# Patient Record
Sex: FEMALE | Race: WHITE | NOT HISPANIC OR LATINO | Employment: PART TIME | URBAN - METROPOLITAN AREA
[De-identification: names, ages, dates, MRNs, and addresses within clinical notes are randomized per-mention and may not be internally consistent; named-entity substitution may affect disease eponyms.]

---

## 2018-11-22 ENCOUNTER — HOSPITAL ENCOUNTER (EMERGENCY)
Facility: HOSPITAL | Age: 48
Discharge: HOME | End: 2018-11-22
Attending: EMERGENCY MEDICINE | Admitting: EMERGENCY MEDICINE
Payer: COMMERCIAL

## 2018-11-22 VITALS
HEART RATE: 82 BPM | RESPIRATION RATE: 18 BRPM | DIASTOLIC BLOOD PRESSURE: 63 MMHG | WEIGHT: 132 LBS | OXYGEN SATURATION: 97 % | HEIGHT: 65 IN | TEMPERATURE: 96.7 F | SYSTOLIC BLOOD PRESSURE: 137 MMHG | BODY MASS INDEX: 21.99 KG/M2

## 2018-11-22 DIAGNOSIS — S01.511A COMPLICATED LACERATION OF LIP, INITIAL ENCOUNTER: Primary | ICD-10-CM
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
## 2018-11-22 PROCEDURE — 99283 EMERGENCY DEPT VISIT LOW MDM: CPT

## 2018-11-22 RX ORDER — AMOXICILLIN AND CLAVULANATE POTASSIUM 875; 125 MG/1; MG/1
1 TABLET, FILM COATED ORAL EVERY 12 HOURS SCHEDULED
Qty: 20 TABLET | Refills: 0 | Status: SHIPPED | OUTPATIENT
Start: 2018-11-22 | End: 2018-12-02

## 2018-11-22 RX ORDER — ACETAMINOPHEN 500 MG
1000 TABLET ORAL EVERY 6 HOURS PRN
COMMUNITY

## 2018-11-22 RX ORDER — ACYCLOVIR 400 MG/1
400 TABLET ORAL DAILY
COMMUNITY
Start: 2018-08-20

## 2018-11-22 RX ORDER — DIPHENOXYLATE HYDROCHLORIDE AND ATROPINE SULFATE 2.5; .025 MG/1; MG/1
1 TABLET ORAL DAILY
COMMUNITY
End: 2022-02-07 | Stop reason: CLARIF

## 2018-11-22 RX ORDER — LIDOCAINE HYDROCHLORIDE 20 MG/ML
5 INJECTION, SOLUTION EPIDURAL; INFILTRATION; INTRACAUDAL; PERINEURAL ONCE
Status: COMPLETED | OUTPATIENT
Start: 2018-11-22 | End: 2018-11-22

## 2018-11-22 RX ORDER — GINSENG 100 MG
1 CAPSULE ORAL ONCE
Status: COMPLETED | OUTPATIENT
Start: 2018-11-22 | End: 2018-11-22

## 2018-11-22 RX ORDER — AMOXICILLIN AND CLAVULANATE POTASSIUM 875; 125 MG/1; MG/1
1 TABLET, FILM COATED ORAL ONCE
Status: COMPLETED | OUTPATIENT
Start: 2018-11-22 | End: 2018-11-22

## 2018-11-22 RX ADMIN — BACITRACIN ZINC 1 SMALL APPLICATION: 500 OINTMENT TOPICAL at 22:01

## 2018-11-22 RX ADMIN — AMOXICILLIN AND CLAVULANATE POTASSIUM 1 TABLET: 875; 125 TABLET, FILM COATED ORAL at 22:01

## 2018-11-22 RX ADMIN — LIDOCAINE HYDROCHLORIDE 5 ML: 20 INJECTION, SOLUTION EPIDURAL; INFILTRATION; INTRACAUDAL; PERINEURAL at 22:00

## 2018-11-23 NOTE — DISCHARGE INSTRUCTIONS
Animal Bite   WHAT YOU NEED TO KNOW:   Animal bite injuries range from shallow cuts to deep, life-threatening wounds  An animal can cut or puncture the skin when it bites  Your skin may be torn from your body  Your skin may swell or bruise even if the bite does not break the skin  Animal bites occur more often on the hands, arms, legs, and face  Bites from dogs and cats are the most common injuries  DISCHARGE INSTRUCTIONS:   Return to the emergency department if:   · You have a fever  · Your wound is red, swollen, and draining pus  · You see red streaks on the skin around the wound  · You can no longer move the bitten area  · Your heartbeat and breathing are much faster than usual     · You feel dizzy and confused  Contact your healthcare provider if:   · Your pain does not get better, even after you take pain medicine  · You have nightmares or flashbacks about the animal bite  · You have questions or concerns about your condition or care  Medicines: You may need any of the following:  · Antibiotics  prevent or treat a bacterial infection  · Prescription pain medicine  may be given  Ask how to take this medicine safely  · A tetanus vaccine  may be needed to prevent tetanus  Tetanus is a life-threatening bacterial infection that affects the nerves and muscles  The bacteria can be spread through animal bites  · A rabies vaccine  may be needed to prevent rabies  Rabies is a life-threatening viral infection  The virus can be spread through animal bites  · Take your medicine as directed  Contact your healthcare provider if you think your medicine is not helping or if you have side effects  Tell him of her if you are allergic to any medicine  Keep a list of the medicines, vitamins, and herbs you take  Include the amounts, and when and why you take them  Bring the list or the pill bottles to follow-up visits  Carry your medicine list with you in case of an emergency    Follow up with your healthcare provider in 1 to 2 days: You may need to return to have your stitches removed  Write down your questions so you remember to ask them during your visits  Self-care:   · Apply antibiotic ointment as directed  This helps prevent infection in minor skin wounds  It is available without a doctor's order  · Keep the wound clean and covered  Wash the wound every day with soap and water or germ-killing cleanser  Ask your healthcare provider about the kinds of bandages to use  · Apply ice on your wound  Ice helps decrease swelling and pain  Ice may also help prevent tissue damage  Use an ice pack, or put crushed ice in a plastic bag  Cover it with a towel and place it on your wound for 15 to 20 minutes every hour or as directed  · Elevate the wound area  Raise your wound above the level of your heart as often as you can  This will help decrease swelling and pain  Prop your wound on pillows or blankets to keep it elevated comfortably  Prevent another animal bite:   · Learn to recognize the signs of a scared or angry pet  Avoid quick, sudden movements  · Do not step between animals that are fighting  · Do not leave a pet alone with a young child  · Do not disturb an animal while it eats, sleeps, or cares for its young  · Do not approach an animal you do not know, especially one that is tied up or caged  · Stay away from animals that seem sick or act strangely  · Do not feed or capture wild animals  © 2017 [address] [employer] [employer] Information is for End User's use only and may not be sold, redistributed or otherwise used for commercial purposes  All illustrations and images included in CareNotes® are the copyrighted property of [employer]  or Jaime Cantu  The above information is an  only  It is not intended as medical advice for individual conditions or treatments   Talk to your doctor, nurse or pharmacist before following any medical regimen to see if it is safe and effective for you  Facial Laceration   WHAT YOU NEED TO KNOW:   A facial laceration is a tear or cut in the skin caused by blunt or shearing forces, or sharp objects  Facial lacerations may be closed within 24 hours of injury  DISCHARGE INSTRUCTIONS:   Return to the emergency department if:   · You have a fever and the wound is painful, warm, or swollen  The wound area may be red, or fluid may come out of it  · You have heavy bleeding or bleeding that does not stop after 10 minutes of holding firm, direct pressure over the wound  Contact your healthcare provider if:   · Your wound reopens or your tape comes off  · Your wound is very painful  · Your wound is not healing, or you think there is an object in the wound  · The skin around your wound stays numb  · You have questions or concerns about your condition or care  Medicines:   · Antibiotics  may be given to prevent an infection if your wound was deep and had to be cleaned out  · Take your medicine as directed  Contact your healthcare provider if you think your medicine is not helping or if you have side effects  Tell him of her if you are allergic to any medicine  Keep a list of the medicines, vitamins, and herbs you take  Include the amounts, and when and why you take them  Bring the list or the pill bottles to follow-up visits  Carry your medicine list with you in case of an emergency  Care for your wound:  Care for your wound as directed to prevent infection and help it heal  Wash your hands with soap and warm water before and after you care for your wound  You may need to keep the wound dry for the first 24 to 48 hours  When your healthcare provider says it is okay, wash around your wound with soap and water, or as directed  Gently pat the area dry  Do not use alcohol or hydrogen peroxide to clean your wound unless you are directed to    · Do not take aspirin or NSAIDs for 24 hours after being injured  Aspirin and NSAIDs can increase blood flow  Your laceration may continue to bleed  · Do not take hot showers, eat or drink hot foods and liquids for 48 hours after being injured  Also, do not use a heating pad near your laceration  The heat can cause swelling in and around your laceration  · If your wound was covered with a bandage,  leave your bandage on as long as directed  Bandages keep your wound clean and protected  They can also prevent swelling  Ask when and how to change your bandage  Be careful not to apply the bandage or tape too tightly  This could cut off blood flow and cause more injury  · If your wound was closed with stitches,  keep your wound clean  Your healthcare provider may recommend that you apply antibiotic ointment after you clean your wound  · If your wound was closed with wound tape or medical strips,  keep the area clean and dry  The strips will usually fall off on their own after several days  · If your wound was closed with tissue glue,  do not use any ointments or lotions on the area  You may shower, but do not swim or soak in a bathtub  Gently pat the area dry after you take a shower  Do not pick at or scrub the glue area  Decrease scarring: The skin in the area of your wound may turn a different color if it is exposed to direct sunlight  After your wound is healed, use sunscreen over the area when you are out in the sun  You should do this for at least 6 months to 1 year after your injury  Some wounds scar less if they are covered while they heal   Follow up with your healthcare provider as directed: You may need to follow up with your healthcare provider in 24 to 48 hours to have your wound checked for infection  You may need to return in 3 to 5 days if you have stitches that need to be removed  Write down your questions so you remember to ask them during your visits    © 2017 [employer] [employer] Information is for End User's use only and may not be sold, redistributed or otherwise used for commercial purposes  All illustrations and images included in CareNotes® are the copyrighted property of A D A M [employer]  or Jaime Cantu  The above information is an  only  It is not intended as medical advice for individual conditions or treatments  Talk to your doctor, nurse or pharmacist before following any medical regimen to see if it is safe and effective for you

## 2018-11-23 NOTE — ED PROVIDER NOTES
History  Chief Complaint   Patient presents with   [address] she went to kiss her rabbit on the nose and rabbit bit her lower lip one hour ago   2 linear , vertical lacerations lower lip     Pt in ER with c/o bite to her lower lip by her pet rabbit that she was attempting to kiss  Incident occurred at approx 8p  Pt is unsure of her last tetanus vaccine  Prior to Admission Medications   Prescriptions Last Dose Informant Patient Reported? Taking? Biotin 2 5 MG TABS 11/22/2018 at Unknown time  Yes Yes   Sig: Take 1 tablet by mouth daily   Calcium-Magnesium-Vitamin D (CALCIUM 1200+D3 PO) 11/22/2018 at Unknown time  Yes Yes   Sig: Take 1 tablet by mouth 2 (two) times a day   acetaminophen (TYLENOL) 500 mg tablet 11/22/2018 at Unknown time  Yes Yes   Sig: Take 1,000 mg by mouth every 6 (six) hours as needed   acyclovir (ZOVIRAX) 400 MG tablet 11/22/2018 at Unknown time  Yes Yes   Sig: Take 400 mg by mouth 2 (two) times a day   multivitamin (THERAGRAN) TABS 11/22/2018 at Unknown time  Yes Yes   Sig: Take 1 tablet by mouth daily      Facility-Administered Medications: None       Past Medical History:   Diagnosis Date    Hearing impaired        Past Surgical History:   Procedure Laterality Date    JOINT REPLACEMENT Right     knee       History reviewed  No pertinent family history  I have reviewed and agree with the history as documented  Social History   Substance Use Topics    Smoking status: Former Smoker    Smokeless tobacco: Never Used    Alcohol use Yes      Comment: social         Review of Systems   Constitutional: Negative for chills and fever  Respiratory: Negative for cough, chest tightness and shortness of breath  Gastrointestinal: Negative for abdominal pain, diarrhea, nausea and vomiting  Genitourinary: Negative for dysuria, frequency, hematuria and urgency  Musculoskeletal: Negative for back pain, neck pain and neck stiffness  Skin: Positive for wound     All other systems reviewed and are negative  Physical Exam  Physical Exam   Constitutional: She appears well-developed and well-nourished  HENT:   Head: Normocephalic  Head is with laceration  Mouth/Throat: Lacerations present  u shaped laceration noted to right lower lip, with flap crossing vermillion border, approx 4cm in total length   Eyes: Pupils are equal, round, and reactive to light  EOM are normal    Skin: Laceration noted  Nursing note and vitals reviewed  Vital Signs  ED Triage Vitals [11/22/18 2121]   Temperature Pulse Respirations Blood Pressure SpO2   (!) 96 7 °F (35 9 °C) 82 18 137/63 97 %      Temp Source Heart Rate Source Patient Position - Orthostatic VS BP Location FiO2 (%)   Oral Monitor Sitting Right arm --      Pain Score       5           Vitals:    11/22/18 2121 11/22/18 2130   BP: 137/63 137/63   Pulse: 82    Patient Position - Orthostatic VS: Sitting        Visual Acuity      ED Medications  Medications   lidocaine (PF) (XYLOCAINE-MPF) 2 % injection 5 mL (5 mL Infiltration Given 11/22/18 2200)   amoxicillin-clavulanate (AUGMENTIN) 875-125 mg per tablet 1 tablet (1 tablet Oral Given 11/22/18 2201)   bacitracin topical ointment 1 small application (1 small application Topical Given 11/22/18 2201)       Diagnostic Studies  Results Reviewed     None                 No orders to display              Procedures  Lac Repair  Date/Time: 11/22/2018 10:15 PM  Performed by: Teri Holden by: Jevon Cobos   Consent: Verbal consent obtained  Written consent not obtained    Risks and benefits: risks, benefits and alternatives were discussed  Consent given by: patient  Patient understanding: patient states understanding of the procedure being performed  Site marked: the operative site was marked  Required items: required blood products, implants, devices, and special equipment available  Patient identity confirmed: verbally with patient  Time out: Immediately prior to procedure a "time out" was called to verify the correct patient, procedure, equipment, support staff and site/side marked as required  Body area: mouth  Location details: lower lip, interior  Laceration length: 3 cm  Foreign bodies: no foreign bodies  Tendon involvement: none  Nerve involvement: none  Vascular damage: no  Anesthesia: local infiltration    Anesthesia:  Local Anesthetic: lidocaine 2% without epinephrine  Anesthetic total: 5 mL    Sedation:  Patient sedated: no    Wound Dehiscence:  Superficial Wound Dehiscence: simple closure  Secondary closure or dehiscence: complex    Procedure Details:  Preparation: Patient was prepped and draped in the usual sterile fashion  Irrigation solution: tap water  Irrigation method: tap  Amount of cleaning: standard  Debridement: none  Degree of undermining: none  Skin closure: 6-0 nylon  Number of sutures: 8  Technique: simple  Approximation: close  Approximation difficulty: complex  Dressing: antibiotic ointment  Patient tolerance: Patient tolerated the procedure well with no immediate complications             Phone Contacts  ED Phone Contact    ED Course                               MDM  Number of Diagnoses or Management Options  Complicated laceration of lip, initial encounter:   Rabbit bite, initial encounter:   Diagnosis management comments: Laceration is complicated - jagged, with a flap, repaired in ER  Pt referred for f/u with Plastic surgeon   She agrees with plan    Risk of Complications, Morbidity, and/or Mortality  Presenting problems: moderate  Diagnostic procedures: moderate  Management options: moderate    Patient Progress  Patient progress: improved    CritCare Time    Disposition  Final diagnoses:   Complicated laceration of lip, initial encounter   Rabbit bite, initial encounter     Time reflects when diagnosis was documented in both MDM as applicable and the Disposition within this note     Time User Action Codes Description Comment    11/22/2018 11:01 PM Eva Wong Add [L94 [id number]] Complicated laceration of lip, initial encounter     11/22/2018 11:01 PM Eva Wong Add [[id number]] Rabbit bite, initial encounter       ED Disposition     ED Disposition Condition Comment    Discharge  Lucy Rodriguez discharge to home/self care  Condition at discharge: Stable        Follow-up Information     Follow up With Specialties Details Why [address] [facility], [place] Plastic Surgery Schedule an appointment as soon as possible for a visit in 1 day for follow up, For wound re-check [address] [facility] [address]  [address] [facility] [address], [place] Family Medicine In 5 days For suture removal  You may return to the ER if unable to follow up with your primary care physician  [address] [place] [address] Road [address] [phone]            Discharge Medication List as of 11/22/2018 11:04 PM      START taking these medications    Details   amoxicillin-clavulanate (AUGMENTIN) 875-125 mg per tablet Take 1 tablet by mouth every 12 (twelve) hours for 10 days, Starting Thu 11/22/2018, Until Sun 12/2/2018, Normal         CONTINUE these medications which have NOT CHANGED    Details   acetaminophen (TYLENOL) 500 mg tablet Take 1,000 mg by mouth every 6 (six) hours as needed, Historical Med      acyclovir (ZOVIRAX) 400 MG tablet Take 400 mg by mouth 2 (two) times a day, Starting Mon 8/20/2018, Historical Med      Biotin 2 5 MG TABS Take 1 tablet by mouth daily, Historical Med      Calcium-Magnesium-Vitamin D (CALCIUM 1200+D3 PO) Take 1 tablet by mouth 2 (two) times a day, Starting Tue 2/4/2014, Historical Med      multivitamin (THERAGRAN) TABS Take 1 tablet by mouth daily, Historical Med           No discharge procedures on file      ED Provider  Electronically Signed by           Siddhartha Corley DO  11/23/18 5859

## 2019-07-02 ENCOUNTER — TRANSCRIBE ORDERS (OUTPATIENT)
Dept: ADMINISTRATIVE | Facility: HOSPITAL | Age: 49
End: 2019-07-02

## 2019-07-02 DIAGNOSIS — M25.461 KNEE EFFUSION, RIGHT: Primary | ICD-10-CM

## 2019-07-02 DIAGNOSIS — Z96.651 HX OF TOTAL KNEE REPLACEMENT, RIGHT: ICD-10-CM

## 2019-07-09 ENCOUNTER — HOSPITAL ENCOUNTER (OUTPATIENT)
Dept: RADIOLOGY | Facility: HOSPITAL | Age: 49
Discharge: HOME | End: 2019-07-09
Payer: COMMERCIAL

## 2019-07-09 DIAGNOSIS — M25.461 KNEE EFFUSION, RIGHT: ICD-10-CM

## 2019-07-09 DIAGNOSIS — Z96.651 HX OF TOTAL KNEE REPLACEMENT, RIGHT: ICD-10-CM

## 2019-07-09 PROCEDURE — 78315 BONE IMAGING 3 PHASE: CPT

## 2019-07-09 PROCEDURE — A9503 TC99M MEDRONATE: HCPCS

## 2020-09-14 ENCOUNTER — TRANSCRIBE ORDERS (OUTPATIENT)
Dept: URGENT CARE | Facility: CLINIC | Age: 50
End: 2020-09-14

## 2020-09-14 DIAGNOSIS — Z02.1 PRE-EMPLOYMENT HEALTH SCREENING EXAMINATION: Primary | ICD-10-CM

## 2020-09-14 PROCEDURE — 86765 RUBEOLA ANTIBODY: CPT | Performed by: PHYSICIAN ASSISTANT

## 2020-09-14 PROCEDURE — 86480 TB TEST CELL IMMUN MEASURE: CPT | Performed by: PHYSICIAN ASSISTANT

## 2020-11-13 ENCOUNTER — HOSPITAL ENCOUNTER (EMERGENCY)
Facility: HOSPITAL | Age: 50
Discharge: HOME | End: 2020-11-13
Attending: EMERGENCY MEDICINE | Admitting: EMERGENCY MEDICINE
Payer: COMMERCIAL

## 2020-11-13 ENCOUNTER — APPOINTMENT (EMERGENCY)
Dept: RADIOLOGY | Facility: HOSPITAL | Age: 50
End: 2020-11-13

## 2020-11-13 VITALS
TEMPERATURE: 98.2 F | DIASTOLIC BLOOD PRESSURE: 79 MMHG | WEIGHT: 150 LBS | OXYGEN SATURATION: 96 % | RESPIRATION RATE: 18 BRPM | HEART RATE: 78 BPM | SYSTOLIC BLOOD PRESSURE: 132 MMHG | BODY MASS INDEX: 24.96 KG/M2

## 2020-11-13 DIAGNOSIS — M79.89 REDNESS AND SWELLING OF HAND: Primary | ICD-10-CM

## 2020-11-13 DIAGNOSIS — R23.8 REDNESS AND SWELLING OF HAND: Primary | ICD-10-CM

## 2020-11-13 PROCEDURE — 73130 X-RAY EXAM OF HAND: CPT

## 2020-11-13 PROCEDURE — 99284 EMERGENCY DEPT VISIT MOD MDM: CPT | Performed by: PHYSICIAN ASSISTANT

## 2020-11-13 PROCEDURE — 99283 EMERGENCY DEPT VISIT LOW MDM: CPT

## 2020-11-13 RX ORDER — CEPHALEXIN 500 MG/1
500 CAPSULE ORAL EVERY 6 HOURS SCHEDULED
Qty: 28 CAPSULE | Refills: 0 | Status: SHIPPED | OUTPATIENT
Start: 2020-11-13 | End: 2020-11-20

## 2020-12-21 ENCOUNTER — IMMUNIZATIONS (OUTPATIENT)
Dept: FAMILY MEDICINE CLINIC | Facility: HOSPITAL | Age: 50
End: 2020-12-21
Payer: COMMERCIAL

## 2020-12-21 DIAGNOSIS — Z23 ENCOUNTER FOR IMMUNIZATION: ICD-10-CM

## 2020-12-21 PROCEDURE — 0001A SARS-COV-2 / COVID-19 MRNA VACCINE (PFIZER-BIONTECH) 30 MCG: CPT

## 2020-12-21 PROCEDURE — 91300 SARS-COV-2 / COVID-19 MRNA VACCINE (PFIZER-BIONTECH) 30 MCG: CPT

## 2021-01-10 ENCOUNTER — IMMUNIZATIONS (OUTPATIENT)
Dept: FAMILY MEDICINE CLINIC | Facility: HOSPITAL | Age: 51
End: 2021-01-10

## 2021-01-10 DIAGNOSIS — Z23 ENCOUNTER FOR IMMUNIZATION: ICD-10-CM

## 2021-01-10 PROCEDURE — 0002A SARS-COV-2 / COVID-19 MRNA VACCINE (PFIZER-BIONTECH) 30 MCG: CPT

## 2021-01-10 PROCEDURE — 91300 SARS-COV-2 / COVID-19 MRNA VACCINE (PFIZER-BIONTECH) 30 MCG: CPT

## 2021-05-26 ENCOUNTER — OFFICE VISIT (OUTPATIENT)
Dept: CARDIOLOGY CLINIC | Facility: CLINIC | Age: 51
End: 2021-05-26
Payer: COMMERCIAL

## 2021-05-26 VITALS
HEART RATE: 69 BPM | BODY MASS INDEX: 23.6 KG/M2 | DIASTOLIC BLOOD PRESSURE: 68 MMHG | SYSTOLIC BLOOD PRESSURE: 112 MMHG | WEIGHT: 138.2 LBS | HEIGHT: 64 IN | OXYGEN SATURATION: 98 %

## 2021-05-26 DIAGNOSIS — E78.41 ELEVATED LP(A): Primary | ICD-10-CM

## 2021-05-26 PROCEDURE — 1036F TOBACCO NON-USER: CPT | Performed by: INTERNAL MEDICINE

## 2021-05-26 PROCEDURE — 3008F BODY MASS INDEX DOCD: CPT | Performed by: INTERNAL MEDICINE

## 2021-05-26 PROCEDURE — 93000 ELECTROCARDIOGRAM COMPLETE: CPT | Performed by: INTERNAL MEDICINE

## 2021-05-26 PROCEDURE — 99204 OFFICE O/P NEW MOD 45 MIN: CPT | Performed by: INTERNAL MEDICINE

## 2021-05-26 RX ORDER — NORETHINDRONE ACETATE AND ETHINYL ESTRADIOL 1; .02 MG/1; MG/1
1 TABLET ORAL DAILY
COMMUNITY
Start: 2021-05-18 | End: 2022-02-07 | Stop reason: CLARIF

## 2021-05-26 RX ORDER — CHOLECALCIFEROL (VITAMIN D3) 125 MCG
1 CAPSULE ORAL DAILY
COMMUNITY

## 2021-05-26 RX ORDER — FLUOXETINE 20 MG/1
20 TABLET, FILM COATED ORAL DAILY
COMMUNITY
End: 2022-02-07 | Stop reason: CLARIF

## 2021-05-26 NOTE — PROGRESS NOTES
Cardiology Consultation     Phillip Solis  [id number]  1970  [facility] [place]  [id number] [address]      There are no diagnoses linked to this encounter  I had the pleasure of seeing Phillip Solis for a consultation regarding an elevated Lp(a) requested by Dr Kunz    History of the Presenting Illness, Discussion/Summary and my Plan are as follows:::     Tennis Filemon is a pleasant 51-year-old lady without a history of hypertension, diabetes, dyslipidemia  She has only about a 6 pack year smoking history-quit at the age of 29  Alcohol is about 2 glasses of wine a day on average  She is on a completely plant based diet and a diet history was also obtained and is quite healthy  She does not have a family history of premature vascular disease in first-degree relatives-mom is 78 with rheumatoid arthritis and hypothyroidism without vascular disease, father is 80 and quite healthy despite having hypertension and hyperlipidemia  Her sister at 48 is on propranolol for palpitations, also has hyperlipidemia  She is mainly here for an elevated LP(a) at 80, done because she was aware of it and requested it be checked  She is completely asymptomatic from a cardiac standpoint, is quite active, does the [employer] up to 60 minutes a day, 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-heavy physical work without any cardiac symptoms  Has lost about 15 lb in the last 1 year  Gets about [phone] steps a day  Lipid profile is unremarkable with total cholesterol 163, triglycerides 68, HDL 68, LDL 81 and non HDL 95 -March 2021    Plan:    Elevated lipoprotein (a): At this point, there is no consensus as to whether this is a risk factor or a risk marker  In general in patients with vascular disease and elevated LPA, goal is to lower LDL to the maximum tolerated level    In her case, LDL and non-HDL are within normal limits  Will simply check a stress test to rule out any existing coronary artery disease-will suspect that this will be negative  And check a calcium score which will help to compare her with age and gender matched controls  If her calcium score is elevated, will start her on a statin to lower future risk  She will continue to lead her very healthy lifestyle  Follow-up in 6 months    No diagnosis found  There is no problem list on file for this patient      Past Medical History:   Diagnosis Date    Hearing impaired      Social History     Socioeconomic History    Marital status: /Civil Union     Spouse name: Not on file    Number of children: Not on file    Years of education: Not on file    Highest education level: Not on file   Occupational History    Not on file   Social Needs    Financial resource strain: Not on file    Food insecurity     Worry: Not on file     Inability: Not on file   [employer] needs     Medical: Not on file     Non-medical: Not on file   Tobacco Use    Smoking status: Former Smoker    Smokeless tobacco: Never Used   Substance and Sexual Activity    Alcohol use: Yes     Comment: social     Drug use: No    Sexual activity: Not on file   Lifestyle    Physical activity     Days per week: Not on file     Minutes per session: Not on file    Stress: Not on file   Relationships    Social connections     Talks on phone: Not on file     Gets together: Not on file     Attends Advent service: Not on file     Active member of club or organization: Not on file     Attends meetings of clubs or organizations: Not on file     Relationship status: Not on file    Intimate partner violence     Fear of current or ex partner: Not on file     Emotionally abused: Not on file     Physically abused: Not on file     Forced sexual activity: Not on file   Other Topics Concern    Not on file   Social History Narrative    Not on file      Family History   Problem Relation Age of Onset    Heart disease Mother     Hypertension Father     Hyperlipidemia Father     Heart disease Father     Heart failure Maternal Grandmother     Heart attack Paternal Grandfather     Heart failure Cousin      Past Surgical History:   Procedure Laterality Date    JOINT REPLACEMENT Right     knee       Current Outpatient Medications:     acetaminophen (TYLENOL) 500 mg tablet, Take 1,000 mg by mouth every 6 (six) hours as needed, Disp: , Rfl:     acyclovir (ZOVIRAX) 400 MG tablet, Take 400 mg by mouth daily , Disp: , Rfl:     Cholecalciferol (Vitamin D) 125 MCG (5000 UT) CAPS, Take 1 capsule by mouth daily, Disp: , Rfl:     FLUoxetine (PROzac) 20 MG tablet, Take 20 mg by mouth daily And 40 mg every other day, Disp: , Rfl:     multivitamin (THERAGRAN) TABS, Take 1 tablet by mouth daily, Disp: , Rfl:     norethindrone-ethinyl estradiol (MICROGESTIN 1/20) 1-20 MG-MCG per tablet, Take 1 tablet by mouth daily, Disp: , Rfl:     Biotin 2 5 MG TABS, Take 1 tablet by mouth daily, Disp: , Rfl:     Calcium-Magnesium-Vitamin D (CALCIUM 1200+D3 PO), Take 1 tablet by mouth 2 (two) times a day, Disp: , Rfl:   No Known Allergies  Vitals:    05/26/21 1256   BP: 112/68   BP Location: Left arm   Patient Position: Sitting   Cuff Size: Standard   Pulse: 69   SpO2: 98%   Weight: 62 7 kg (138 lb 3 2 oz)   Height: 5' 4" (1 626 m)         Imaging: No results found  Review of Systems:  Review of Systems   Constitutional: Negative  HENT: Negative  Eyes: Negative  Respiratory: Negative  Cardiovascular: Negative  Endocrine: Negative  Musculoskeletal: Negative  Physical Exam:    /68 (BP Location: Left arm, Patient Position: Sitting, Cuff Size: Standard)   Pulse 69   Ht 5' 4" (1 626 m)   Wt 62 7 kg (138 lb 3 2 oz)   SpO2 98%   BMI 23 72 kg/m²   Physical Exam  Constitutional:       General: She is not in acute distress  Appearance: Normal appearance   She is not ill-appearing  HENT:      Nose: Nose normal  No congestion or rhinorrhea  Mouth/Throat:      Mouth: Mucous membranes are moist       Pharynx: No oropharyngeal exudate or posterior oropharyngeal erythema  Eyes:      General:         Right eye: No discharge  Left eye: No discharge  Conjunctiva/sclera: Conjunctivae normal       Pupils: Pupils are equal, round, and reactive to light  Cardiovascular:      Rate and Rhythm: Normal rate and regular rhythm  Heart sounds: No murmur  No friction rub  No gallop  Pulmonary:      Effort: Pulmonary effort is normal  No respiratory distress  Breath sounds: No stridor  No wheezing or rhonchi  Abdominal:      General: Abdomen is flat  Bowel sounds are normal  There is no distension  Palpations: There is no mass  Tenderness: There is no abdominal tenderness  Musculoskeletal: Normal range of motion  General: No swelling, tenderness, deformity or signs of injury  Skin:     Coloration: Skin is not jaundiced or pale  Findings: No bruising or erythema  Neurological:      Mental Status: She is alert  This note was completed in part utilizing [email or web address] direct voice recognition software  Grammatical errors, random word insertion, spelling mistakes, occasional wrong word or "sound-alike" substitutions and incomplete sentences may be an occasional consequence of the system secondary to software limitations, ambient noise and hardware issues  At the time of dictation, efforts were made to edit, clarify and /or correct errors  Please read the chart carefully and recognize, using context, where substitutions have occurred  If you have any questions or concerns about the context, text or information contained within the body of this dictation, please contact myself, the provider, for further clarification

## 2021-06-03 ENCOUNTER — HOSPITAL ENCOUNTER (OUTPATIENT)
Dept: RADIOLOGY | Facility: HOSPITAL | Age: 51
Discharge: HOME | End: 2021-06-03
Attending: INTERNAL MEDICINE
Payer: COMMERCIAL

## 2021-06-03 ENCOUNTER — TRANSCRIBE ORDERS (OUTPATIENT)
Dept: RADIOLOGY | Facility: HOSPITAL | Age: 51
End: 2021-06-03

## 2021-06-03 DIAGNOSIS — E78.41 ELEVATED LP(A): ICD-10-CM

## 2021-06-03 PROCEDURE — 75571 CT HRT W/O DYE W/CA TEST: CPT

## 2021-06-03 PROCEDURE — G1004 CDSM NDSC: HCPCS

## 2021-06-10 DIAGNOSIS — E78.41 ELEVATED LP(A): Primary | ICD-10-CM

## 2021-06-10 DIAGNOSIS — R93.1 ELEVATED CORONARY ARTERY CALCIUM SCORE: ICD-10-CM

## 2021-06-10 RX ORDER — ROSUVASTATIN CALCIUM 5 MG/1
2.5 TABLET, COATED ORAL DAILY
Qty: 30 TABLET | Refills: 3 | Status: SHIPPED | OUTPATIENT
Start: 2021-06-10 | End: 2021-12-06 | Stop reason: SDUPTHER

## 2021-06-10 NOTE — PROGRESS NOTES
Ordered rosuvastatin 2 5 mg daily  While her calcium score was only at 6, that would still put her in the 88 percentile for  Matched controls  She was willing to start a statin    Recheck lipids in 3 months   discussed with her on the phone

## 2021-06-17 ENCOUNTER — HOSPITAL ENCOUNTER (OUTPATIENT)
Dept: NON INVASIVE DIAGNOSTICS | Facility: CLINIC | Age: 51
Discharge: HOME | End: 2021-06-17
Payer: COMMERCIAL

## 2021-06-17 DIAGNOSIS — E78.41 ELEVATED LP(A): ICD-10-CM

## 2021-06-17 PROCEDURE — 93017 CV STRESS TEST TRACING ONLY: CPT

## 2021-06-17 PROCEDURE — 93018 CV STRESS TEST I&R ONLY: CPT | Performed by: INTERNAL MEDICINE

## 2021-06-17 PROCEDURE — 93016 CV STRESS TEST SUPVJ ONLY: CPT | Performed by: INTERNAL MEDICINE

## 2021-06-21 ENCOUNTER — TELEPHONE (OUTPATIENT)
Dept: CARDIOLOGY CLINIC | Facility: CLINIC | Age: 51
End: 2021-06-21

## 2021-06-21 DIAGNOSIS — R94.39 ABNORMAL STRESS TEST: Primary | ICD-10-CM

## 2021-06-23 LAB
ARRHY DURING EX: NORMAL
CHEST PAIN STATEMENT: NORMAL
MAX DIASTOLIC BP: 70 MMHG
MAX HEART RATE: 148 BPM
MAX PREDICTED HEART RATE: 170 BPM
MAX. SYSTOLIC BP: 152 MMHG
PROTOCOL NAME: NORMAL
REASON FOR TERMINATION: NORMAL
TARGET HR FORMULA: NORMAL
TEST INDICATION: NORMAL
TIME IN EXERCISE PHASE: NORMAL

## 2021-06-28 ENCOUNTER — HOSPITAL ENCOUNTER (OUTPATIENT)
Dept: NON INVASIVE DIAGNOSTICS | Facility: HOSPITAL | Age: 51
Discharge: HOME | End: 2021-06-28
Payer: COMMERCIAL

## 2021-06-28 DIAGNOSIS — R94.39 ABNORMAL STRESS TEST: ICD-10-CM

## 2021-06-28 PROCEDURE — 93350 STRESS TTE ONLY: CPT

## 2021-06-28 PROCEDURE — 93351 STRESS TTE COMPLETE: CPT | Performed by: INTERNAL MEDICINE

## 2021-06-29 LAB
ARRHY DURING EX: NORMAL
CHEST PAIN STATEMENT: NORMAL
MAX DIASTOLIC BP: 70 MMHG
MAX HEART RATE: 155 BPM
MAX PREDICTED HEART RATE: 170 BPM
MAX. SYSTOLIC BP: 150 MMHG
PROTOCOL NAME: NORMAL
REASON FOR TERMINATION: NORMAL
TARGET HR FORMULA: NORMAL
TEST INDICATION: NORMAL
TIME IN EXERCISE PHASE: NORMAL

## 2021-07-02 ENCOUNTER — TELEPHONE (OUTPATIENT)
Dept: CARDIOLOGY CLINIC | Facility: CLINIC | Age: 51
End: 2021-07-02

## 2021-08-22 ENCOUNTER — NURSE TRIAGE (OUTPATIENT)
Dept: OTHER | Facility: OTHER | Age: 51
End: 2021-08-22

## 2021-08-22 DIAGNOSIS — Z20.828 EXPOSURE TO SARS-ASSOCIATED CORONAVIRUS: Primary | ICD-10-CM

## 2021-08-22 PROCEDURE — 87635 SARS-COV-2 COVID-19 AMP PRB: CPT | Performed by: FAMILY MEDICINE

## 2021-08-22 NOTE — TELEPHONE ENCOUNTER
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

## 2021-08-22 NOTE — TELEPHONE ENCOUNTER
Reason for Disposition   [1] COVID-19 infection suspected by caller or triager AND [2] mild symptoms (cough, fever, or others) AND [9] no complications or SOB    Answer Assessment - Initial Assessment Questions  1  Were you within 6 feet or less, for up to 15 minutes or more with a person that has a confirmed COVID-19 test?   Yes, at work     2  What was the date of your exposure? 8/18/2021     3  Are you experiencing any symptoms attributed to the virus?  (Assess for SOB, cough, fever, difficulty breathing)   Started yesterday cough, headache, congestion, and runny nose  No fever at this time  Denies SOB  4   HIGH RISK: Do you have any history heart or lung conditions, weakened immune system, diabetes, Asthma, CHF, HIV, COPD, Chemo, renal failure, sickle cell, etc?   Denies    Protocols used: CORONAVIRUS (COVID-19) DIAGNOSED OR SUSPECTED-[facility]

## 2021-08-22 NOTE — TELEPHONE ENCOUNTER
[employer] employee  Order placed for COVID testing due to exposure and symptoms, despite being COVID vaccinated  Patient to follow up with her manager and employee health  Has an out of network PCP that she will be following up with as well

## 2021-08-24 ENCOUNTER — TELEPHONE (OUTPATIENT)
Dept: OTHER | Facility: OTHER | Age: 51
End: 2021-08-24

## 2021-08-25 NOTE — TELEPHONE ENCOUNTER
The patient was called for notification of a test result for COVID-19  The patient did not answer the phone and a voicemail was left requesting a call back to [phone], Option 7

## 2021-08-26 ENCOUNTER — TELEPHONE (OUTPATIENT)
Dept: OTHER | Facility: OTHER | Age: 51
End: 2021-08-26

## 2021-08-27 NOTE — TELEPHONE ENCOUNTER
The patient was called for notification of a test result for COVID-19  The patient did not answer the phone and a voicemail was left requesting a call back to [phone], Option 7

## 2021-12-02 ENCOUNTER — APPOINTMENT (OUTPATIENT)
Dept: LAB | Facility: HOSPITAL | Age: 51
End: 2021-12-02
Attending: INTERNAL MEDICINE
Payer: COMMERCIAL

## 2021-12-02 DIAGNOSIS — E78.41 ELEVATED LP(A): ICD-10-CM

## 2021-12-02 DIAGNOSIS — R93.1 ELEVATED CORONARY ARTERY CALCIUM SCORE: ICD-10-CM

## 2021-12-02 LAB
CHOLEST SERPL-MCNC: 165 MG/DL
HDLC SERPL-MCNC: 69 MG/DL
LDLC SERPL CALC-MCNC: 72 MG/DL (ref 0–100)
TRIGL SERPL-MCNC: 120 MG/DL

## 2021-12-02 PROCEDURE — 80061 LIPID PANEL: CPT

## 2021-12-02 PROCEDURE — 36415 COLL VENOUS BLD VENIPUNCTURE: CPT

## 2021-12-06 ENCOUNTER — OFFICE VISIT (OUTPATIENT)
Dept: CARDIOLOGY CLINIC | Facility: CLINIC | Age: 51
End: 2021-12-06
Payer: COMMERCIAL

## 2021-12-06 VITALS
HEIGHT: 64 IN | WEIGHT: 147.4 LBS | BODY MASS INDEX: 25.16 KG/M2 | SYSTOLIC BLOOD PRESSURE: 120 MMHG | HEART RATE: 66 BPM | DIASTOLIC BLOOD PRESSURE: 76 MMHG

## 2021-12-06 DIAGNOSIS — E78.41 ELEVATED LP(A): ICD-10-CM

## 2021-12-06 DIAGNOSIS — Z00.00 HEALTHCARE MAINTENANCE: Primary | ICD-10-CM

## 2021-12-06 DIAGNOSIS — R93.1 ELEVATED CORONARY ARTERY CALCIUM SCORE: ICD-10-CM

## 2021-12-06 PROCEDURE — 99214 OFFICE O/P EST MOD 30 MIN: CPT | Performed by: INTERNAL MEDICINE

## 2021-12-06 PROCEDURE — 93000 ELECTROCARDIOGRAM COMPLETE: CPT | Performed by: INTERNAL MEDICINE

## 2021-12-06 RX ORDER — AMOXICILLIN 500 MG/1
CAPSULE ORAL
COMMUNITY
Start: 2021-12-06

## 2021-12-06 RX ORDER — SERTRALINE HYDROCHLORIDE 25 MG/1
TABLET, FILM COATED ORAL
COMMUNITY
Start: 2021-10-28 | End: 2022-02-07 | Stop reason: CLARIF

## 2021-12-06 RX ORDER — ROSUVASTATIN CALCIUM 5 MG/1
5 TABLET, COATED ORAL DAILY
Qty: 90 TABLET | Refills: 3 | Status: SHIPPED | OUTPATIENT
Start: 2021-12-06 | End: 2022-05-19 | Stop reason: SINTOL

## 2021-12-09 ENCOUNTER — IMMUNIZATIONS (OUTPATIENT)
Dept: FAMILY MEDICINE CLINIC | Facility: HOSPITAL | Age: 51
End: 2021-12-09

## 2021-12-09 DIAGNOSIS — Z23 ENCOUNTER FOR IMMUNIZATION: Primary | ICD-10-CM

## 2021-12-09 PROCEDURE — 0001A COVID-19 PFIZER VACC 0.3 ML: CPT

## 2021-12-09 PROCEDURE — 91300 COVID-19 PFIZER VACC 0.3 ML: CPT

## 2021-12-15 ENCOUNTER — TELEPHONE (OUTPATIENT)
Dept: CARDIOLOGY CLINIC | Facility: CLINIC | Age: 51
End: 2021-12-15

## 2022-02-07 ENCOUNTER — OFFICE VISIT (OUTPATIENT)
Dept: GASTROENTEROLOGY | Facility: CLINIC | Age: 52
End: 2022-02-07
Payer: COMMERCIAL

## 2022-02-07 VITALS
TEMPERATURE: 97.7 F | HEART RATE: 68 BPM | HEIGHT: 64 IN | BODY MASS INDEX: 25.27 KG/M2 | RESPIRATION RATE: 18 BRPM | DIASTOLIC BLOOD PRESSURE: 70 MMHG | OXYGEN SATURATION: 97 % | SYSTOLIC BLOOD PRESSURE: 118 MMHG | WEIGHT: 148 LBS

## 2022-02-07 DIAGNOSIS — Z11.59 NEED FOR HEPATITIS C SCREENING TEST: ICD-10-CM

## 2022-02-07 DIAGNOSIS — Z12.11 COLON CANCER SCREENING: ICD-10-CM

## 2022-02-07 DIAGNOSIS — K21.9 GASTROESOPHAGEAL REFLUX DISEASE, UNSPECIFIED WHETHER ESOPHAGITIS PRESENT: ICD-10-CM

## 2022-02-07 DIAGNOSIS — R10.13 DYSPEPSIA: Primary | ICD-10-CM

## 2022-02-07 PROCEDURE — 99244 OFF/OP CNSLTJ NEW/EST MOD 40: CPT | Performed by: INTERNAL MEDICINE

## 2022-02-07 RX ORDER — SODIUM, POTASSIUM,MAG SULFATES 17.5-3.13G
1 SOLUTION, RECONSTITUTED, ORAL ORAL ONCE
Qty: 177 ML | Refills: 0 | Status: SHIPPED | OUTPATIENT
Start: 2022-02-07 | End: 2022-05-31 | Stop reason: ALTCHOICE

## 2022-02-07 RX ORDER — NAPROXEN SODIUM 220 MG
TABLET ORAL
COMMUNITY

## 2022-02-07 NOTE — PATIENT INSTRUCTIONS
Scheduled date of EGD/colonoscopy (as of today):03 14 22  Physician performing EGD/colonoscopy: Dr Isela Selby  Location of EGD/colonoscopy:[facility]  Desired bowel prep reviewed with patient:SUPREP  Instructions reviewed with patient by:NUNU IN THE OFFICE  Clearances:  N/A

## 2022-02-07 NOTE — PROGRESS NOTES
[facility] - Outpatient Consultation  Marchia Goltz 46 y o  female MRN: [id number]  Encounter: [id number]        ASSESSMENT AND PLAN:  46year old female here for evaluation  1  Dyspepsia/GERD  -symptoms occurring 2 to 4 times a week  -reasonable to do EGD to ensure no erosive esophagitis  -pt does not want med at this time  -discussed GERD diet and lifestyle changes, handout given    2  Colon cancer screening  -will do colonoscopy as patient is agreeable, she had a colonoscopy in 2017 but was told was a fair prep and was given a 10 year follow-up, given fair prep and now is over 50 will repeat colonoscopy now    3  Need for hepatitis C screening test  -will order    Follow up annually due to chronic GERD symptoms        __________________________________________________________    HPI:  46year old female here for evaluation  Drinking alcohol 2-4 drinks daily  Quit smoking in 1998  Complaining of GERD symptoms with feeling of burping and "mini throw ups "  Symptoms are occurring 2-4 times a week  Used to take prilosec but then had covid and stopped it  Symptoms made worse with tomato sauce or certain spices  Usually just avoiding trigger foods is helpful for her symptoms  Denies any dysphagia or weight loss  Last EGD in 2015  Had a colonoscopy in 2017 due to rectal bleeding  Reports a known fissure for years  Was told it was not a great prep at her colonoscopy  Doesn't recall having any polyps removed  Denies family history of colon cancer or personal history of liver disease  Currently having some hemorrhoids, she thinks, or a skin tag that is bothering her in the rectum  Takes dulcolax every other day currently  Currently having a BM every day  Had knee revision surgery on the right- her third surgery  Hasn't been working in 2 months time  Currently doing PT for the knee  Sees Cardiology for elevated lipoprotein A    She is on a statin  REVIEW OF SYSTEMS:    CONSTITUTIONAL: Denies any fever, chills, rigors, and weight loss  HEENT: No earache or tinnitus  Denies hearing loss or visual disturbances  CARDIOVASCULAR: No chest pain or palpitations  RESPIRATORY: Denies any cough, hemoptysis, shortness of breath or dyspnea on exertion  GASTROINTESTINAL: As noted in the History of Present Illness  GENITOURINARY: No problems with urination  Denies any hematuria or dysuria  NEUROLOGIC: No dizziness or vertigo, denies headaches  MUSCULOSKELETAL:  Recent knee surgery   SKIN: Denies skin rashes or itching  ENDOCRINE: Denies excessive thirst  Denies intolerance to heat or cold  PSYCHOSOCIAL: Denies depression or anxiety  Denies any recent memory loss       Historical Information   Past Medical History:   Diagnosis Date    Hearing impaired      Past Surgical History:   Procedure Laterality Date    JOINT REPLACEMENT Right     knee     Social History   Social History     Substance and Sexual Activity   Alcohol Use Yes    Comment: social      Social History     Substance and Sexual Activity   Drug Use No     Social History     Tobacco Use   Smoking Status Former Smoker   Smokeless Tobacco Never Used     Family History   Problem Relation Age of Onset    Heart disease Mother     Hypertension Father     Hyperlipidemia Father     Heart disease Father     Heart failure Maternal Grandmother     Heart attack Paternal Grandfather     Heart failure Cousin      Meds/Allergies       Current Outpatient Medications:     acetaminophen (TYLENOL) 500 mg tablet    acyclovir (ZOVIRAX) 400 MG tablet    amoxicillin (AMOXIL) 500 mg capsule    Cholecalciferol (Vitamin D) 125 MCG (5000 UT) CAPS    naproxen sodium (Aleve) 220 MG tablet    rosuvastatin (CRESTOR) 5 mg tablet    Allergies   Allergen Reactions    Gabapentin Anxiety and Delirium    Nickel Dermatitis and Itching    Titanium Dermatitis, Itching and Other (See Comments)     Objective Blood pressure 118/70, pulse 68, temperature 97 7 °F (36 5 °C), temperature source Tympanic, resp  rate 18, height 5' 4" (1 626 m), weight 67 1 kg (148 lb), SpO2 97 %  Body mass index is 25 4 kg/m²  PHYSICAL EXAM:      General Appearance:   Alert, cooperative, no distress, difficulty getting on exam table due to recent knee surgery   HEENT:   Normocephalic, atraumatic, anicteric      Neck:  Supple, symmetrical, trachea midline   Lungs:   Clear to auscultation bilaterally; no rales, rhonchi or wheezing; respirations unlabored    Heart[de-identified]   Regular rate and rhythm; no murmur, rub, or gallop  Abdomen:   Soft, non-tender, non-distended; normal bowel sounds; no masses, no organomegaly    Genitalia:   Deferred    Rectal:   Deferred    Extremities:  No cyanosis, clubbing or edema    Pulses:  2+ and symmetric    Skin:  No jaundice, rashes, or lesions    Lymph nodes:  No palpable cervical lymphadenopathy        Lab Results:   No visits with results within 1 Day(s) from this visit  Latest known visit with results is:   Appointment on 12/02/2021   Component Date Value    Cholesterol 12/02/2021 165     Triglycerides 12/02/2021 120     HDL, Direct 12/02/2021 69     LDL Calculated 12/02/2021 72      Recent LFT's wnl  Hb 12 3    Radiology Results:   No results found

## 2022-02-09 ENCOUNTER — PATIENT MESSAGE (OUTPATIENT)
Dept: GASTROENTEROLOGY | Facility: CLINIC | Age: 52
End: 2022-02-09

## 2022-02-09 DIAGNOSIS — K64.9 HEMORRHOIDS, UNSPECIFIED HEMORRHOID TYPE: Primary | ICD-10-CM

## 2022-02-09 RX ORDER — MENTHOL AND ZINC OXIDE .44; 20.625 G/100G; G/100G
OINTMENT TOPICAL DAILY
Qty: 30 G | Refills: 0 | Status: SHIPPED | OUTPATIENT
Start: 2022-02-09

## 2022-02-16 ENCOUNTER — OFFICE VISIT (OUTPATIENT)
Dept: OBGYN CLINIC | Facility: CLINIC | Age: 52
End: 2022-02-16
Payer: COMMERCIAL

## 2022-02-16 VITALS
BODY MASS INDEX: 25.78 KG/M2 | HEIGHT: 64 IN | SYSTOLIC BLOOD PRESSURE: 106 MMHG | DIASTOLIC BLOOD PRESSURE: 64 MMHG | WEIGHT: 151 LBS

## 2022-02-16 DIAGNOSIS — N95.1 MENOPAUSAL VAGINAL DRYNESS: Primary | ICD-10-CM

## 2022-02-16 PROCEDURE — 99204 OFFICE O/P NEW MOD 45 MIN: CPT | Performed by: OBSTETRICS & GYNECOLOGY

## 2022-02-16 RX ORDER — CELECOXIB 200 MG/1
200 CAPSULE ORAL DAILY
COMMUNITY
Start: 2021-12-22 | End: 2022-05-04 | Stop reason: ALTCHOICE

## 2022-02-16 RX ORDER — ASPIRIN 81 MG/1
81 TABLET ORAL 2 TIMES DAILY
COMMUNITY
Start: 2021-12-21 | End: 2022-05-04 | Stop reason: ALTCHOICE

## 2022-02-16 RX ORDER — ONDANSETRON 4 MG/1
TABLET, FILM COATED ORAL
COMMUNITY
Start: 2021-12-22

## 2022-02-16 NOTE — PROGRESS NOTES
Assessment/Plan:   Diagnoses and all orders for this visit:    Menopausal vaginal dryness  - discussed that symptoms such as vaginal dryness is culminating and likely contributing to her decreased libido but also decreased libido is common during menopause  Reviewed different options for treatment of vaginal dryness with non-hormonal options, HRT, and vaginal estrogen  - discussed recommendation for use of silicone lubricant, coconut oil, and barrier emollient to help with vaginal irritation if dryness is causing vaginal irritation even when not sexually active  Discussed different sexual script and approach to sex prior to insertion in penetrating intercourse and use of coconut oil and lubricant to help with arousal and sexual pleasure to make the activity less uncomfortable  - discussed that vaginal estrogen is likely to help significantly with vaginal dryness as is HRT  Patient would prefer plant based product and conjugated estrogens go against her personal tenet as a vegan  - We discussed the risk of heart attack, stroke, breast cancer and blood clots with HRT  We discussed the need for progesterone as unopposed estrogen can lead to growth of the uterine lining that can increase the risk of uterine cancer  We discussed side effects may include vaginal spotting, fluid retention and breast soreness  We discussed the benefits of hormone replacement therapy which has been shown to be the best treatment for the relief of hot flashes and night sweats, and also offers relief of vaginal dryness, protect against bone loss and helps prevent hip and spine fractures and that combined estrogen progestin therapy may reduce the risk of colon cancer  Follow up prn for further discussion of treatment options or for annual gyn visit  Other orders  -     tapentadol (NUCYNTA) 50 mg tablet;  Take 50 mg by mouth every 6 (six) hours as needed  -     Polyethylene Glycol 3350 (DULCOLAX BALANCE PO)  -     celecoxib (CeleBREX) 200 mg capsule; Take 200 mg by mouth daily (Patient not taking: Reported on 2/16/2022 )  -     aspirin (ECOTRIN LOW STRENGTH) 81 mg EC tablet; Take 81 mg by mouth 2 (two) times a day  -     ondansetron (ZOFRAN) 4 mg tablet; TAKE 1 TABLET BY MOUTH EVERY DAY AS NEEDED FOR NAUSEA OR VOMITING        Subjective:    Patient ID: Zeke Huddleston is a 46 y o  female  Chief Complaint   Patient presents with   [facility] Patient Visit     patient is new to our practice   Menopause     vaginal dryness, moodiness       Lucy is a 50yo patient presenting as a new patient for gyn consultation regarding menopausal symptoms  She was seen by her prior OBGYN provider in April 2021 and states that she expressed some concern regarding her vaginal dryness and pain with intercourse to her provider then as well  She also reports moodiness and low libido  She states that she is unsure of her last menstrual period date but thinks that it occurred in Fall 2021 (possibly September 2021?)  She reports a history of mood conditions such as depression and anxiety for which she has tried Prozac and Zoloft in the past but she stopped as she felt that the medication was not helping and making her feel very "foggy"  She was also prescribed an oral OCP from her prior gyn but stopped taking it after a couple months as it was not helping her dyspareunia and dryness  The following portions of the patient's history were reviewed and updated as appropriate: allergies, current medications, past family history, past medical history, past social history, past surgical history and problem list     Review of Systems   Constitutional: Negative for activity change, appetite change and unexpected weight change  Respiratory: Negative for cough and shortness of breath  Cardiovascular: Negative for chest pain  Gastrointestinal: Negative for rectal pain  Genitourinary: Positive for vaginal pain   Negative for dyspareunia, menstrual problem, pelvic pain, vaginal bleeding and vaginal discharge  Low libido   Psychiatric/Behavioral: Positive for dysphoric mood  Negative for sleep disturbance  Moodiness         Objective:  /64 (BP Location: Right arm, Patient Position: Sitting, Cuff Size: Standard)   Ht 5' 4" (1 626 m)   Wt 68 5 kg (151 lb)   BMI 25 92 kg/m²   Physical Exam  Constitutional:       General: She is not in acute distress  Appearance: Normal appearance  She is normal weight  She is not diaphoretic  HENT:      Head: Normocephalic and atraumatic  Pulmonary:      Effort: Pulmonary effort is normal  No respiratory distress  Musculoskeletal:      Right lower leg: No edema  Left lower leg: No edema  Neurological:      Mental Status: She is alert and oriented to person, place, and time  Skin:     General: Skin is warm and dry  Coloration: Skin is not pale  Psychiatric:         Mood and Affect: Mood normal          Behavior: Behavior normal          Thought Content: Thought content normal          Judgment: Judgment normal    Vitals and nursing note reviewed

## 2022-02-16 NOTE — PATIENT INSTRUCTIONS
Fransisco Ayala,  These are the medications that would be considered for menopausal symptoms that we discussed today:    Paxil (paroxetine) - SSRI (selective serotonin reuptake inhibitor)  Common medication for anxiety and depression but found to be helpful for vasomotor menopausal symptoms and sleep disturbance if that is main complaint/struggle with menopause  Non-hormonal approach  Vaginal estrogens  Premarin - conjugated estrogen  Not amenable for you as you mentioned seeking more plant based or vegan options, but wanted to list everything I mentioned  Estrace - vaginal estrogen  I believe it's plant based and not conjugated estrogen, but I would encourage you to double check to be sure  Combo estrogen/progesterone patch - hormone replacement therapy  - CombiPatch or Vivelle-Dot are 2 brands that are commonly used for hormone replacement therapy with weekly or twice weekly patch forms  The estrogen within in are synthetic and plant based  One last option that we didn't fully discuss is the use of bioidentical hormones  I don't order or manage those types of hormones but a local physician in our area, Dr Debbi Lugo, does run a Menopause focused clinic/practice that has those options  If that is something you are interested, it is another option to pursue

## 2022-02-17 ENCOUNTER — PATIENT MESSAGE (OUTPATIENT)
Dept: OBGYN CLINIC | Facility: CLINIC | Age: 52
End: 2022-02-17

## 2022-02-17 DIAGNOSIS — N94.19 DYSPAREUNIA DUE TO MEDICAL CONDITION IN FEMALE: ICD-10-CM

## 2022-02-17 DIAGNOSIS — N95.1 MENOPAUSAL VAGINAL DRYNESS: Primary | ICD-10-CM

## 2022-02-17 DIAGNOSIS — N95.1 MENOPAUSAL VASOMOTOR SYNDROME: ICD-10-CM

## 2022-02-17 RX ORDER — PROGESTERONE 100 MG/1
1 CAPSULE ORAL
Qty: 84 CAPSULE | Refills: 1 | Status: SHIPPED | OUTPATIENT
Start: 2022-02-17 | End: 2022-05-04 | Stop reason: SDUPTHER

## 2022-02-17 RX ORDER — ESTRADIOL 0.03 MG/D
1 FILM, EXTENDED RELEASE TRANSDERMAL 2 TIMES WEEKLY
Qty: 24 PATCH | Refills: 1 | Status: SHIPPED | OUTPATIENT
Start: 2022-02-17 | End: 2022-05-04 | Stop reason: SDUPTHER

## 2022-03-01 ENCOUNTER — DOCUMENTATION (OUTPATIENT)
Dept: CARDIOLOGY CLINIC | Facility: CLINIC | Age: 52
End: 2022-03-01

## 2022-03-01 NOTE — PROGRESS NOTES
Complaints of whole body pains including arm discomfort/myalgias without a clear reason-   Symptoms for the last 1 month  Statin dose was increased from rosuvastatin 2 5--rosuvastatin 5 mg 3 months ago  She had knee surgery 10 weeks ago and is slowly recuperating        Will have her hold her statin entirely for 4 weeks and if she sees no improvement, will go back on rosuvastatin 5 mg daily   if her symptoms improve, will try going back on 2 5 mg daily     plan was discussed with her, she will keep us posted

## 2022-03-10 ENCOUNTER — TELEPHONE (OUTPATIENT)
Dept: GASTROENTEROLOGY | Facility: AMBULARY SURGERY CENTER | Age: 52
End: 2022-03-10

## 2022-03-10 ENCOUNTER — APPOINTMENT (OUTPATIENT)
Dept: LAB | Age: 52
End: 2022-03-10
Payer: COMMERCIAL

## 2022-03-10 DIAGNOSIS — Z11.59 NEED FOR HEPATITIS C SCREENING TEST: ICD-10-CM

## 2022-03-10 LAB — HCV AB SER QL: NORMAL

## 2022-03-10 PROCEDURE — 36415 COLL VENOUS BLD VENIPUNCTURE: CPT

## 2022-03-10 PROCEDURE — 86803 HEPATITIS C AB TEST: CPT

## 2022-03-10 NOTE — TELEPHONE ENCOUNTER
Patients GI provider:  Dr Lisa Kaba     Number to return call: [phone]    Reason for call: Pt calling requesting to reschedule her procedure       Scheduled procedure/appointment date if applicable: Apt/procedure 3/14/22

## 2022-03-11 NOTE — TELEPHONE ENCOUNTER
Left message for patient to call back to reschedule procedure  Offered 5/31, 6/2, 6/6 or 6/27 with Dr Cyndie Reyes at [facility]  Gave my direct line to call back on

## 2022-03-14 NOTE — TELEPHONE ENCOUNTER
Colon/egd rescheduled to 5/31/22 with Dr Devin Barragan at [facility] [address]  Patient still has prep instructions

## 2022-05-04 ENCOUNTER — ANNUAL EXAM (OUTPATIENT)
Dept: OBGYN CLINIC | Facility: CLINIC | Age: 52
End: 2022-05-04
Payer: COMMERCIAL

## 2022-05-04 VITALS
HEIGHT: 64 IN | WEIGHT: 150.8 LBS | SYSTOLIC BLOOD PRESSURE: 114 MMHG | DIASTOLIC BLOOD PRESSURE: 72 MMHG | BODY MASS INDEX: 25.74 KG/M2

## 2022-05-04 DIAGNOSIS — N95.1 MENOPAUSAL VAGINAL DRYNESS: ICD-10-CM

## 2022-05-04 DIAGNOSIS — N95.1 MENOPAUSAL VASOMOTOR SYNDROME: ICD-10-CM

## 2022-05-04 DIAGNOSIS — Z01.419 ENCOUNTER FOR WELL WOMAN EXAM WITH ROUTINE GYNECOLOGICAL EXAM: Primary | ICD-10-CM

## 2022-05-04 DIAGNOSIS — Z12.31 ENCOUNTER FOR SCREENING MAMMOGRAM FOR BREAST CANCER: ICD-10-CM

## 2022-05-04 DIAGNOSIS — R92.2 DENSE BREASTS: ICD-10-CM

## 2022-05-04 DIAGNOSIS — N94.19 DYSPAREUNIA DUE TO MEDICAL CONDITION IN FEMALE: ICD-10-CM

## 2022-05-04 PROCEDURE — 99396 PREV VISIT EST AGE 40-64: CPT | Performed by: OBSTETRICS & GYNECOLOGY

## 2022-05-04 RX ORDER — BUPROPION HYDROCHLORIDE 300 MG/1
300 TABLET ORAL EVERY MORNING
COMMUNITY
Start: 2022-03-17

## 2022-05-04 RX ORDER — PROGESTERONE 100 MG/1
1 CAPSULE ORAL
Qty: 84 CAPSULE | Refills: 4 | Status: SHIPPED | OUTPATIENT
Start: 2022-05-04

## 2022-05-04 RX ORDER — ESTRADIOL 0.03 MG/D
1 FILM, EXTENDED RELEASE TRANSDERMAL 2 TIMES WEEKLY
Qty: 24 PATCH | Refills: 4 | Status: SHIPPED | OUTPATIENT
Start: 2022-05-05 | End: 2022-08-01

## 2022-05-04 NOTE — PROGRESS NOTES
ASSESSMENT & PLAN:   Diagnoses and all orders for this visit:    Encounter for well woman exam with routine gynecological exam    Encounter for screening mammogram for breast cancer  -     Mammo screening bilateral w 3d & cad; Future  -     US breast screening bilateral complete (ABUS); Future    Dense breasts  -     US breast screening bilateral complete (ABUS); Future    Menopausal vaginal dryness  -     Progesterone 100 MG CAPS; Take 1 tablet by mouth daily at bedtime  -     estradiol (Vivelle-Dot) 0 025 MG/24HR; Place 1 patch on the skin 2 (two) times a week    Dyspareunia due to medical condition in female  -     Progesterone 100 MG CAPS; Take 1 tablet by mouth daily at bedtime  -     estradiol (Vivelle-Dot) 0 025 MG/24HR; Place 1 patch on the skin 2 (two) times a week    Menopausal vasomotor syndrome  -     Progesterone 100 MG CAPS; Take 1 tablet by mouth daily at bedtime  -     estradiol (Vivelle-Dot) 0 025 MG/24HR; Place 1 patch on the skin 2 (two) times a week    Other orders  -     buPROPion (WELLBUTRIN XL) 300 mg 24 hr tablet; Take 300 mg by mouth every morning          The following were reviewed in today's visit: ASCCP guidelines, Gardisil vaccination, STD testing mammography screening ordered, use and side effects of HRT, menopause, adequate intake of calcium and vitamin D, exercise, healthy diet and colonoscopy discussed and scheduled  Patient to return to office in yearly for annual exam      All questions have been answered to her satisfaction  CC:  Annual Gynecologic Examination  Chief Complaint   Patient presents with    Gynecologic Exam     Last Pap: 18 (-/-) and 2/15/17 (-/-); Last Mammo: 12/3/21 (1-) (ABUS recommended-per result Colon: 22 consulted  HPI: Elmer Ley is a 46 y o   who presents for annual gynecologic examination  She has the following concerns:  No concerns       Lucy recently started Vivelle-dot patch and nightly progesterone to help with vaginal dryness and effects of oncoming menopause  Most recent menses last month (first one in about 6 months)  Still having dyspareunia with insertion mainly even with utilization of lubrication  Tolerating HRT well  Reports improvement in mood since starting wellbutrin  Health Maintenance:    Exercise: frequently  Breast exams/breast awareness: yes  Diet: well balanced diet  Last mammogram: 2021  Colorectal cancer screenin22 scheduled for colonoscopy      Past Medical History:   Diagnosis Date    Genital warts     Hearing impaired     Herpes        Past Surgical History:   Procedure Laterality Date    JOINT REPLACEMENT Right     knee       Past OB/Gyn History:  Period Pattern: (!) Irregular  Menstrual Flow: Moderate  Menstrual Control: Thin pad  Dysmenorrhea: NonePatient's last menstrual period was 2022 (exact date)  Menopausal status: perimenopausal  Menopausal symptoms: vaginal dryness    Last Pap: 2018 : no abnormalities  History of abnormal Pap smear: no    Patient is currently sexually active     STD testing: no  Current contraception: perimenopausal status      Family History  Family History   Problem Relation Age of Onset    Heart disease Mother     Hypertension Father     Hyperlipidemia Father     Heart disease Father     Heart failure Maternal Grandmother     Heart attack Paternal Grandfather     Heart failure Cousin        Family history of uterine or ovarian cancer: no  Family history of breast cancer: no  Family history of colon cancer: no    Social History:  Social History     Socioeconomic History    Marital status: /Civil Union     Spouse name: Not on file    Number of children: Not on file    Years of education: Not on file    Highest education level: Not on file   Occupational History    Not on file   Tobacco Use    Smoking status: Former Smoker    Smokeless tobacco: Never Used   Vaping Use    Vaping Use: Never used   Substance and Sexual Activity  Alcohol use: Yes     Comment: social     Drug use: No    Sexual activity: Yes     Partners: Male     Birth control/protection: Male Sterilization     Comment: Vasectomy   Other Topics Concern    Not on file   Social History Narrative    Not on file     Social Determinants of Health     Financial Resource Strain: Not on file   Food Insecurity: Not on file   Transportation Needs: Not on file   Physical Activity: Not on file   Stress: Not on file   Social Connections: Not on file   Intimate Partner Violence: Not on file   Housing Stability: Not on file     Domestic violence screen: negative    Allergies:   Allergies   Allergen Reactions    Gabapentin Anxiety and Delirium    Nickel Dermatitis and Itching    Titanium Dermatitis, Itching and Other (See Comments)       Medications:    Current Outpatient Medications:     acetaminophen (TYLENOL) 500 mg tablet, Take 1,000 mg by mouth every 6 (six) hours as needed, Disp: , Rfl:     acyclovir (ZOVIRAX) 400 MG tablet, Take 400 mg by mouth daily , Disp: , Rfl:     amoxicillin (AMOXIL) 500 mg capsule, , Disp: , Rfl:     buPROPion (WELLBUTRIN XL) 300 mg 24 hr tablet, Take 300 mg by mouth every morning, Disp: , Rfl:     Cholecalciferol (Vitamin D) 125 MCG (5000 UT) CAPS, Take 1 capsule by mouth daily, Disp: , Rfl:     [START ON 5/5/2022] estradiol (Vivelle-Dot) 0 025 MG/24HR, Place 1 patch on the skin 2 (two) times a week, Disp: 24 patch, Rfl: 4    naproxen sodium (Aleve) 220 MG tablet, , Disp: , Rfl:     ondansetron (ZOFRAN) 4 mg tablet, TAKE 1 TABLET BY MOUTH EVERY DAY AS NEEDED FOR NAUSEA OR VOMITING, Disp: , Rfl:     Progesterone 100 MG CAPS, Take 1 tablet by mouth daily at bedtime, Disp: 84 capsule, Rfl: 4    menthol-zinc oxide (CALMOSPETINE) 0 44-20 625 %, Apply topically in the morning (Patient not taking: Reported on 5/4/2022 ), Disp: 30 g, Rfl: 0    Na Sulfate-K Sulfate-Mg Sulf (Suprep Bowel Prep Kit) 17 5-3 13-1 6 GM/177ML SOLN, Take 1 Bottle by mouth once for 1 dose, Disp: 177 mL, Rfl: 0    rosuvastatin (CRESTOR) 5 mg tablet, Take 1 tablet (5 mg total) by mouth daily (Patient not taking: Reported on 5/4/2022 ), Disp: 90 tablet, Rfl: 3    tapentadol (NUCYNTA) 50 mg tablet, Take 50 mg by mouth every 6 (six) hours as needed (Patient not taking: Reported on 5/4/2022 ), Disp: , Rfl:     Review of Systems:  Review of Systems   Constitutional: Negative for activity change, appetite change and unexpected weight change  Respiratory: Negative for cough and shortness of breath  Cardiovascular: Negative for chest pain  Gastrointestinal: Negative for abdominal pain, constipation, diarrhea, nausea and vomiting  Genitourinary: Positive for dyspareunia (insertional pain) and vaginal pain (vaginal dryness)  Negative for difficulty urinating, frequency, menstrual problem, pelvic pain, urgency, vaginal bleeding and vaginal discharge  Musculoskeletal: Negative for back pain  Skin: Negative  Neurological: Negative for dizziness, weakness, light-headedness and headaches  Psychiatric/Behavioral: Negative  Physical Exam:  /72 (BP Location: Right arm, Patient Position: Sitting, Cuff Size: Standard)   Ht 5' 4" (1 626 m)   Wt 68 4 kg (150 lb 12 8 oz)   LMP 04/20/2022 (Exact Date)   Breastfeeding No   BMI 25 88 kg/m²    Physical Exam  Constitutional:       General: She is not in acute distress  Appearance: Normal appearance  She is well-developed and normal weight  She is not diaphoretic  Genitourinary:      Vulva and bladder normal       No lesions in the vagina  Genitourinary Comments: Perineum normal in appearance, no lacerations, no ulcerations, no lesions visualized  Right Labia: No rash, tenderness or lesions  Left Labia: No tenderness, lesions or rash  No inguinal adenopathy present in the right or left side  No vaginal discharge, erythema, tenderness or bleeding  No vaginal prolapse present       Mild vaginal atrophy present  Right Adnexa: not tender, not full and no mass present  Left Adnexa: not tender, not full and no mass present  No cervical motion tenderness, discharge, friability, lesion or polyp  No parametrium nodularity or thickening present  Uterus is not enlarged or tender  No uterine mass detected  Uterus is anteverted  No urethral prolapse or mass present  Bladder is not tender  Pelvic exam was performed with patient in the lithotomy position  Rectum:      No tenderness or external hemorrhoid  Rectal exam comments: Small lesion, likely herpetic, healing well  Breasts: Breasts are symmetrical       Right: No swelling, bleeding, mass, skin change or tenderness  Left: No swelling, bleeding, mass, skin change or tenderness  HENT:      Head: Normocephalic and atraumatic  Neck:      Thyroid: No thyromegaly or thyroid tenderness  Cardiovascular:      Rate and Rhythm: Normal rate and regular rhythm  Heart sounds: Normal heart sounds  No murmur heard  No friction rub  Pulmonary:      Effort: Pulmonary effort is normal  No respiratory distress  Breath sounds: Normal breath sounds  No wheezing or rales  Abdominal:      Palpations: Abdomen is soft  There is no mass  Tenderness: There is no abdominal tenderness  There is no guarding  Musculoskeletal:         General: No tenderness  Normal range of motion  Right lower leg: No edema  Left lower leg: No edema  Lymphadenopathy:      Lower Body: No right inguinal adenopathy  No left inguinal adenopathy  Neurological:      Mental Status: She is alert and oriented to person, place, and time  Skin:     General: Skin is warm and dry  Coloration: Skin is not pale  Findings: No erythema  Psychiatric:         Mood and Affect: Mood normal          Behavior: Behavior normal          Thought Content:  Thought content normal          Judgment: Judgment normal    Vitals and nursing note reviewed

## 2022-05-19 ENCOUNTER — PATIENT MESSAGE (OUTPATIENT)
Dept: OBGYN CLINIC | Facility: CLINIC | Age: 52
End: 2022-05-19

## 2022-05-19 DIAGNOSIS — R93.1 ELEVATED CORONARY ARTERY CALCIUM SCORE: ICD-10-CM

## 2022-05-19 DIAGNOSIS — E78.41 ELEVATED LP(A): Primary | ICD-10-CM

## 2022-05-19 DIAGNOSIS — Z87.39 HISTORY OF OSTEOPENIA: Primary | ICD-10-CM

## 2022-05-19 DIAGNOSIS — N95.1 MENOPAUSAL VASOMOTOR SYNDROME: ICD-10-CM

## 2022-05-19 RX ORDER — PRAVASTATIN SODIUM 10 MG
10 TABLET ORAL DAILY
Qty: 30 TABLET | Refills: 6 | Status: SHIPPED | OUTPATIENT
Start: 2022-05-19

## 2022-05-19 NOTE — PROGRESS NOTES
Will try Prava 10    Has had side effects (arthralgias) to Rosuva 2 5 mg (tried this dose after a Rosuva 5 mg statin holiday)

## 2022-05-30 RX ORDER — SODIUM CHLORIDE, SODIUM LACTATE, POTASSIUM CHLORIDE, CALCIUM CHLORIDE 600; 310; 30; 20 MG/100ML; MG/100ML; MG/100ML; MG/100ML
100 INJECTION, SOLUTION INTRAVENOUS CONTINUOUS
Status: CANCELLED | OUTPATIENT
Start: 2022-05-30

## 2022-05-31 ENCOUNTER — HOSPITAL ENCOUNTER (OUTPATIENT)
Dept: GASTROENTEROLOGY | Facility: AMBULARY SURGERY CENTER | Age: 52
Setting detail: OUTPATIENT SURGERY
Discharge: HOME | End: 2022-05-31
Attending: INTERNAL MEDICINE
Payer: COMMERCIAL

## 2022-05-31 ENCOUNTER — ANESTHESIA EVENT (OUTPATIENT)
Dept: GASTROENTEROLOGY | Facility: AMBULARY SURGERY CENTER | Age: 52
End: 2022-05-31

## 2022-05-31 ENCOUNTER — ANESTHESIA (OUTPATIENT)
Dept: GASTROENTEROLOGY | Facility: AMBULARY SURGERY CENTER | Age: 52
End: 2022-05-31

## 2022-05-31 VITALS
RESPIRATION RATE: 18 BRPM | HEIGHT: 64 IN | WEIGHT: 144 LBS | BODY MASS INDEX: 24.59 KG/M2 | OXYGEN SATURATION: 99 % | TEMPERATURE: 97.6 F | SYSTOLIC BLOOD PRESSURE: 117 MMHG | HEART RATE: 63 BPM | DIASTOLIC BLOOD PRESSURE: 83 MMHG

## 2022-05-31 DIAGNOSIS — K21.9 GASTROESOPHAGEAL REFLUX DISEASE, UNSPECIFIED WHETHER ESOPHAGITIS PRESENT: ICD-10-CM

## 2022-05-31 DIAGNOSIS — Z12.11 COLON CANCER SCREENING: ICD-10-CM

## 2022-05-31 DIAGNOSIS — R10.13 DYSPEPSIA: ICD-10-CM

## 2022-05-31 PROCEDURE — 43239 EGD BIOPSY SINGLE/MULTIPLE: CPT | Performed by: INTERNAL MEDICINE

## 2022-05-31 PROCEDURE — G0121 COLON CA SCRN NOT HI RSK IND: HCPCS | Performed by: INTERNAL MEDICINE

## 2022-05-31 PROCEDURE — 88305 TISSUE EXAM BY PATHOLOGIST: CPT | Performed by: PATHOLOGY

## 2022-05-31 RX ORDER — LIDOCAINE HYDROCHLORIDE 20 MG/ML
INJECTION, SOLUTION INFILTRATION; PERINEURAL AS NEEDED
Status: DISCONTINUED | OUTPATIENT
Start: 2022-05-31 | End: 2022-05-31

## 2022-05-31 RX ORDER — PROPOFOL 10 MG/ML
INJECTION, EMULSION INTRAVENOUS CONTINUOUS PRN
Status: DISCONTINUED | OUTPATIENT
Start: 2022-05-31 | End: 2022-05-31

## 2022-05-31 RX ORDER — PROPOFOL 10 MG/ML
INJECTION, EMULSION INTRAVENOUS AS NEEDED
Status: DISCONTINUED | OUTPATIENT
Start: 2022-05-31 | End: 2022-05-31

## 2022-05-31 RX ORDER — FENTANYL CITRATE 50 UG/ML
INJECTION, SOLUTION INTRAMUSCULAR; INTRAVENOUS AS NEEDED
Status: DISCONTINUED | OUTPATIENT
Start: 2022-05-31 | End: 2022-05-31

## 2022-05-31 RX ORDER — SODIUM CHLORIDE, SODIUM LACTATE, POTASSIUM CHLORIDE, CALCIUM CHLORIDE 600; 310; 30; 20 MG/100ML; MG/100ML; MG/100ML; MG/100ML
INJECTION, SOLUTION INTRAVENOUS CONTINUOUS PRN
Status: DISCONTINUED | OUTPATIENT
Start: 2022-05-31 | End: 2022-05-31

## 2022-05-31 RX ADMIN — FENTANYL CITRATE 25 MCG: 50 INJECTION, SOLUTION INTRAMUSCULAR; INTRAVENOUS at 09:08

## 2022-05-31 RX ADMIN — FENTANYL CITRATE 25 MCG: 50 INJECTION, SOLUTION INTRAMUSCULAR; INTRAVENOUS at 09:14

## 2022-05-31 RX ADMIN — PROPOFOL 150 MG: 10 INJECTION, EMULSION INTRAVENOUS at 09:00

## 2022-05-31 RX ADMIN — FENTANYL CITRATE 50 MCG: 50 INJECTION, SOLUTION INTRAMUSCULAR; INTRAVENOUS at 08:57

## 2022-05-31 RX ADMIN — PROPOFOL 30 MG: 10 INJECTION, EMULSION INTRAVENOUS at 09:05

## 2022-05-31 RX ADMIN — PROPOFOL 100 MG: 10 INJECTION, EMULSION INTRAVENOUS at 09:08

## 2022-05-31 RX ADMIN — PROPOFOL 30 MG: 10 INJECTION, EMULSION INTRAVENOUS at 09:17

## 2022-05-31 RX ADMIN — LIDOCAINE HYDROCHLORIDE 5 ML: 20 INJECTION, SOLUTION INFILTRATION; PERINEURAL at 09:00

## 2022-05-31 RX ADMIN — SODIUM CHLORIDE, SODIUM LACTATE, POTASSIUM CHLORIDE, AND CALCIUM CHLORIDE: .6; .31; .03; .02 INJECTION, SOLUTION INTRAVENOUS at 08:49

## 2022-05-31 RX ADMIN — PROPOFOL 50 MG: 10 INJECTION, EMULSION INTRAVENOUS at 09:14

## 2022-05-31 RX ADMIN — PROPOFOL 20 MG: 10 INJECTION, EMULSION INTRAVENOUS at 09:03

## 2022-05-31 RX ADMIN — PROPOFOL 30 MG: 10 INJECTION, EMULSION INTRAVENOUS at 09:25

## 2022-05-31 RX ADMIN — PROPOFOL 20 MG: 10 INJECTION, EMULSION INTRAVENOUS at 09:11

## 2022-05-31 NOTE — ANESTHESIA PREPROCEDURE EVALUATION
Procedure:  EGD  COLONOSCOPY    Relevant Problems   ANESTHESIA (within normal limits)      CARDIO (within normal limits)      PULMONARY (within normal limits)   (-) Sleep apnea   (-) Smoking   (-) URI (upper respiratory infection)      Nervous and Auditory   (+) Hearing impaired      Physical Exam    Airway    Mallampati score: I  TM Distance: >3 FB  Neck ROM: full     Dental   No notable dental hx     Cardiovascular      Pulmonary      Other Findings         Lab Results   Component Value Date    HGBA1C 5 1 12/08/2021     Anesthesia Plan  ASA Score- 1     Anesthesia Type- IV sedation with anesthesia with ASA Monitors  Additional Monitors:   Airway Plan:           Plan Factors-Exercise tolerance (METS): >4 METS  Chart reviewed  Existing labs reviewed  Patient summary reviewed  Patient is not a current smoker  Induction- intravenous  Postoperative Plan-     Informed Consent- Anesthetic plan and risks discussed with patient  I personally reviewed this patient with the CRNA  Discussed and agreed on the Anesthesia Plan with the CRNA  Marry Hussein

## 2022-05-31 NOTE — ANESTHESIA POSTPROCEDURE EVALUATION
Post-Op Assessment Note    CV Status:  Stable  Pain Score: 0    Pain management: adequate     Mental Status:  Awake and alert   Hydration Status:  Stable   PONV Controlled:  None   Airway Patency:  Patent and adequate      Post Op Vitals Reviewed: Yes      Staff: CRNA, Anesthesiologist         No complications documented      BP   97/56   Temp      Pulse  63   Resp   18   SpO2   100%

## 2022-05-31 NOTE — H&P
History and Physical - [facility]  Dylan Rivera 46 y o  female MRN: [id number]                  HPI: Dylan Rivera is a 46y o  year old female who presents for EGD for dyspepsia and GERD and colon cancer screening  REVIEW OF SYSTEMS: Per the HPI, and otherwise unremarkable      Historical Information   Past Medical History:   Diagnosis Date    Genital warts     Hearing impaired     Herpes      Past Surgical History:   Procedure Laterality Date    JOINT REPLACEMENT Right     knee     Social History   Social History     Substance and Sexual Activity   Alcohol Use Yes    Comment: social      Social History     Substance and Sexual Activity   Drug Use Not Currently    Types: Marijuana     Social History     Tobacco Use   Smoking Status Former Smoker   Smokeless Tobacco Never Used     Family History   Problem Relation Age of Onset    Heart disease Mother     Hypertension Father     Hyperlipidemia Father     Heart disease Father     Heart failure Maternal Grandmother     Heart attack Paternal Grandfather     Heart failure Cousin        Meds/Allergies       Current Outpatient Medications:     acetaminophen (TYLENOL) 500 mg tablet    acyclovir (ZOVIRAX) 400 MG tablet    buPROPion (WELLBUTRIN XL) 300 mg 24 hr tablet    Cholecalciferol (Vitamin D) 125 MCG (5000 UT) CAPS    estradiol (Vivelle-Dot) 0 025 MG/24HR    ondansetron (ZOFRAN) 4 mg tablet    pravastatin (PRAVACHOL) 10 mg tablet    Progesterone 100 MG CAPS    amoxicillin (AMOXIL) 500 mg capsule    menthol-zinc oxide (CALMOSPETINE) 0 44-20 625 %    naproxen sodium (ALEVE) 220 MG tablet    tapentadol (NUCYNTA) 50 mg tablet    Allergies   Allergen Reactions    Gabapentin Anxiety and Delirium    Nickel Dermatitis and Itching    Titanium Dermatitis, Itching and Other (See Comments)       Objective     /73   Pulse 75   Temp 97 5 °F (36 4 °C) (Temporal)   Resp 16   Ht 5' 4" (1 626 m)   Wt 65 3 kg (144 lb) SpO2 99%   BMI 24 72 kg/m²       PHYSICAL EXAM    Gen: NAD  Head: NCAT  CV: RRR  CHEST: Clear  ABD: soft, NT/ND  EXT: no edema      ASSESSMENT/PLAN:  This is a 46y o  year old female here for EGD and colonoscopy, and she is stable and optimized for her procedure

## 2022-06-12 ENCOUNTER — OFFICE VISIT (OUTPATIENT)
Dept: URGENT CARE | Facility: CLINIC | Age: 52
End: 2022-06-12
Payer: COMMERCIAL

## 2022-06-12 VITALS
HEART RATE: 91 BPM | HEIGHT: 64 IN | OXYGEN SATURATION: 100 % | BODY MASS INDEX: 25.1 KG/M2 | WEIGHT: 147 LBS | SYSTOLIC BLOOD PRESSURE: 112 MMHG | TEMPERATURE: 97.8 F | DIASTOLIC BLOOD PRESSURE: 65 MMHG | RESPIRATION RATE: 20 BRPM

## 2022-06-12 DIAGNOSIS — H00.021 HORDEOLUM INTERNUM OF RIGHT UPPER EYELID: Primary | ICD-10-CM

## 2022-06-12 PROCEDURE — G0382 LEV 3 HOSP TYPE B ED VISIT: HCPCS | Performed by: PHYSICIAN ASSISTANT

## 2022-06-12 RX ORDER — ERYTHROMYCIN 5 MG/G
0.5 OINTMENT OPHTHALMIC EVERY 12 HOURS SCHEDULED
Qty: 3.5 G | Refills: 0 | Status: SHIPPED | OUTPATIENT
Start: 2022-06-12

## 2022-06-12 RX ORDER — BUPROPION HYDROCHLORIDE 150 MG/1
TABLET ORAL
COMMUNITY
Start: 2022-05-26

## 2022-06-12 NOTE — PROGRESS NOTES
NAME: Elmer Ley is a 46 y o  female  : 1970    MRN: [id number]      Assessment and Plan   Hordeolum internum of right upper eyelid [H00 021]  1  Hordeolum internum of right upper eyelid  erythromycin (ILOTYCIN) ophthalmic ointment      discussed patient appears to be a a hordeolum  Will give erythromycin ointment discussed trial of warm compresses 1st   If no improvement follow-up with eye doctor  She acknowledges      Patient Instructions     Patient Instructions   Warm compresses to the area 3-4 x day   Erythromycin ointment as directed  If no improvement or anything worsens follow up with eye dr Marco Woods to ER if symptoms worsen  Chief Complaint     Chief Complaint   Patient presents with    Eye Swelling     Right eye upper lid swelling and redness, hx of stye in the past, started on Thursday, eye is painful and itchy, crusted in the am, no drainage, no pain with eye movement, no foreign body in eye, no vision changes, pataday eye drops used, no relief  No fevers or cold symptoms          History of Present Illness   Patient with no pertinent past medical history presents complaining of right upper eyelid swelling times 3-4 days  Reports feeling some eye/eyelid irritation last week after being in the grass  Reports noticing little bit of pain to her upper eyelid and a scratchy sensation to her eye shortly after  Reports over the next few days the eyelid became red and swollen  Tried Pataday drops without relief  No changes in vision, photophobia, contact lens use  Denies any pain to the actual eye, discharge  Reports the area is itchy and painful to touch  Review of Systems   Review of Systems   Constitutional: Negative for chills and fever  HENT: Negative for congestion  Eyes: Positive for itching  Negative for photophobia, pain, discharge, redness and visual disturbance  Left upper eyelid swelling   Respiratory: Negative for cough      Neurological: Negative for headaches           Current Medications       Current Outpatient Medications:     acetaminophen (TYLENOL) 500 mg tablet, Take 1,000 mg by mouth every 6 (six) hours as needed, Disp: , Rfl:     acyclovir (ZOVIRAX) 400 MG tablet, Take 400 mg by mouth daily , Disp: , Rfl:     amoxicillin (AMOXIL) 500 mg capsule, , Disp: , Rfl:     buPROPion (WELLBUTRIN XL) 150 mg 24 hr tablet, , Disp: , Rfl:     buPROPion (WELLBUTRIN XL) 300 mg 24 hr tablet, Take 300 mg by mouth every morning, Disp: , Rfl:     Cholecalciferol (Vitamin D) 125 MCG (5000 UT) CAPS, Take 1 capsule by mouth daily, Disp: , Rfl:     erythromycin (ILOTYCIN) ophthalmic ointment, Administer 0 5 inches to the right eye every 12 (twelve) hours, Disp: 3 5 g, Rfl: 0    estradiol (Vivelle-Dot) 0 025 MG/24HR, Place 1 patch on the skin 2 (two) times a week, Disp: 24 patch, Rfl: 4    naproxen sodium (ALEVE) 220 MG tablet, , Disp: , Rfl:     ondansetron (ZOFRAN) 4 mg tablet, TAKE 1 TABLET BY MOUTH EVERY DAY AS NEEDED FOR NAUSEA OR VOMITING, Disp: , Rfl:     pravastatin (PRAVACHOL) 10 mg tablet, Take 1 tablet (10 mg total) by mouth in the morning , Disp: 30 tablet, Rfl: 6    Progesterone 100 MG CAPS, Take 1 tablet by mouth daily at bedtime, Disp: 84 capsule, Rfl: 4    menthol-zinc oxide (CALMOSPETINE) 0 44-20 625 %, Apply topically in the morning (Patient not taking: No sig reported), Disp: 30 g, Rfl: 0    tapentadol (NUCYNTA) 50 mg tablet, Take 50 mg by mouth every 6 (six) hours as needed (Patient not taking: No sig reported), Disp: , Rfl:     Current Allergies     Allergies as of 06/12/2022 - Reviewed 06/12/2022   Allergen Reaction Noted    Gabapentin Anxiety and Delirium 12/08/2021    Nickel Dermatitis and Itching 12/06/2021    Titanium Dermatitis, Itching, and Other (See Comments) 12/06/2021              Past Medical History:   Diagnosis Date    Genital warts     Hearing impaired     Herpes        Past Surgical History:   Procedure Laterality Date    JOINT REPLACEMENT Right     knee       Family History   Problem Relation Age of Onset    Heart disease Mother     Hypertension Father     Hyperlipidemia Father     Heart disease Father     Heart failure Maternal Grandmother     Heart attack Paternal Grandfather     Heart failure Cousin          Medications have been verified  The following portions of the patient's history were reviewed and updated as appropriate: allergies, current medications, past family history, past medical history, past social history, past surgical history and problem list     Objective   /65   Pulse 91   Temp 97 8 °F (36 6 °C) (Tympanic)   Resp 20   Ht 5' 4" (1 626 m)   Wt 66 7 kg (147 lb)   SpO2 100%   BMI 25 23 kg/m²      Physical Exam     Physical Exam  Vitals and nursing note reviewed  Constitutional:       General: She is not in acute distress  Appearance: Normal appearance  She is not ill-appearing, toxic-appearing or diaphoretic  Eyes:      General:         Right eye: No discharge  Left eye: No discharge  Extraocular Movements: Extraocular movements intact  Conjunctiva/sclera: Conjunctivae normal       Pupils: Pupils are equal, round, and reactive to light  Comments: Left upper eyelid:  Area of small mass to the medial aspect with mildly surrounding erythema and edema  Small pinpoint white papule to the lid margin just on the inside of the upper eyelid at the area of the mass  No scleral injection or conjunctiva erythema or edema  No orbital swelling or tenderness  Mildly tender to palpation over the area of a mass  No lateral upper lid tenderness  Cardiovascular:      Rate and Rhythm: Normal rate and regular rhythm  Pulmonary:      Effort: Pulmonary effort is normal  No respiratory distress  Skin:     Capillary Refill: Capillary refill takes less than 2 seconds  Neurological:      Mental Status: She is alert and oriented to person, place, and time

## 2022-06-12 NOTE — PATIENT INSTRUCTIONS
Warm compresses to the area 3-4 x day   Erythromycin ointment as directed  If no improvement or anything worsens follow up with eye

## 2022-08-01 DIAGNOSIS — N95.1 MENOPAUSAL VAGINAL DRYNESS: ICD-10-CM

## 2022-08-01 DIAGNOSIS — N94.19 DYSPAREUNIA DUE TO MEDICAL CONDITION IN FEMALE: ICD-10-CM

## 2022-08-01 DIAGNOSIS — N95.1 MENOPAUSAL VASOMOTOR SYNDROME: ICD-10-CM

## 2022-08-01 RX ORDER — ESTRADIOL 0.03 MG/D
PATCH, EXTENDED RELEASE TRANSDERMAL
Qty: 24 PATCH | Refills: 1 | Status: SHIPPED | OUTPATIENT
Start: 2022-08-01

## 2022-09-14 ENCOUNTER — APPOINTMENT (OUTPATIENT)
Dept: LAB | Facility: HOSPITAL | Age: 52
End: 2022-09-14
Payer: COMMERCIAL

## 2022-09-14 DIAGNOSIS — E78.41 ELEVATED LP(A): ICD-10-CM

## 2022-09-14 DIAGNOSIS — M25.50 PAIN IN JOINT, MULTIPLE SITES: ICD-10-CM

## 2022-09-14 LAB
CHOLEST SERPL-MCNC: 199 MG/DL
CRP SERPL HS-MCNC: 1.27 MG/L
ERYTHROCYTE [SEDIMENTATION RATE] IN BLOOD: 4 MM/HOUR (ref 0–29)
HDLC SERPL-MCNC: 86 MG/DL
LDLC SERPL CALC-MCNC: 102 MG/DL (ref 0–100)
TRIGL SERPL-MCNC: 56 MG/DL

## 2022-09-14 PROCEDURE — 86430 RHEUMATOID FACTOR TEST QUAL: CPT

## 2022-09-14 PROCEDURE — 36415 COLL VENOUS BLD VENIPUNCTURE: CPT

## 2022-09-14 PROCEDURE — 80061 LIPID PANEL: CPT

## 2022-09-14 PROCEDURE — 86141 C-REACTIVE PROTEIN HS: CPT

## 2022-09-14 PROCEDURE — 85652 RBC SED RATE AUTOMATED: CPT

## 2022-09-15 LAB — RHEUMATOID FACT SER QL LA: NEGATIVE

## 2022-09-17 ENCOUNTER — DOCUMENTATION (OUTPATIENT)
Dept: CARDIOLOGY CLINIC | Facility: CLINIC | Age: 52
End: 2022-09-17

## 2022-09-17 DIAGNOSIS — E78.5 DYSLIPIDEMIA: ICD-10-CM

## 2022-09-17 DIAGNOSIS — E78.41 ELEVATED LP(A): Primary | ICD-10-CM

## 2022-09-17 NOTE — PROGRESS NOTES
Lipids have increased (higher than her baseline prestatin) despite Prava 10 (? Paradoxical response)     Reheck in 3 mo with TSH    No need to call, talked to pt

## 2022-10-12 PROBLEM — Z00.00 HEALTHCARE MAINTENANCE: Status: RESOLVED | Noted: 2021-12-06 | Resolved: 2022-10-12

## 2022-11-11 ENCOUNTER — APPOINTMENT (EMERGENCY)
Dept: RADIOLOGY | Facility: HOSPITAL | Age: 52
End: 2022-11-11

## 2022-11-11 ENCOUNTER — HOSPITAL ENCOUNTER (EMERGENCY)
Facility: HOSPITAL | Age: 52
Discharge: HOME | End: 2022-11-11
Attending: EMERGENCY MEDICINE

## 2022-11-11 VITALS
WEIGHT: 146 LBS | OXYGEN SATURATION: 98 % | DIASTOLIC BLOOD PRESSURE: 65 MMHG | BODY MASS INDEX: 25.06 KG/M2 | HEART RATE: 82 BPM | RESPIRATION RATE: 18 BRPM | TEMPERATURE: 98 F | SYSTOLIC BLOOD PRESSURE: 133 MMHG

## 2022-11-11 DIAGNOSIS — L03.90 CELLULITIS: ICD-10-CM

## 2022-11-11 DIAGNOSIS — L03.011 CELLULITIS OF FINGER OF RIGHT HAND: Primary | ICD-10-CM

## 2022-11-11 DIAGNOSIS — T14.8XXA ANIMAL BITE: ICD-10-CM

## 2022-11-11 LAB
ANION GAP SERPL CALCULATED.3IONS-SCNC: 6 MMOL/L (ref 4–13)
BASOPHILS # BLD AUTO: 0.04 THOUSANDS/ÂΜL (ref 0–0.1)
BASOPHILS NFR BLD AUTO: 1 % (ref 0–1)
BUN SERPL-MCNC: 12 MG/DL (ref 5–25)
CALCIUM SERPL-MCNC: 8.6 MG/DL (ref 8.3–10.1)
CHLORIDE SERPL-SCNC: 110 MMOL/L (ref 96–108)
CO2 SERPL-SCNC: 24 MMOL/L (ref 21–32)
CREAT SERPL-MCNC: 0.62 MG/DL (ref 0.6–1.3)
CRP SERPL QL: <3 MG/L
EOSINOPHIL # BLD AUTO: 0.05 THOUSAND/ÂΜL (ref 0–0.61)
EOSINOPHIL NFR BLD AUTO: 1 % (ref 0–6)
ERYTHROCYTE [DISTWIDTH] IN BLOOD BY AUTOMATED COUNT: 12.6 % (ref 11.6–15.1)
ERYTHROCYTE [SEDIMENTATION RATE] IN BLOOD: 4 MM/HOUR (ref 0–29)
GFR SERPL CREATININE-BSD FRML MDRD: 104 ML/MIN/1.73SQ M
GLUCOSE SERPL-MCNC: 94 MG/DL (ref 65–140)
HCT VFR BLD AUTO: 33.9 % (ref 34.8–46.1)
HGB BLD-MCNC: 11.4 G/DL (ref 11.5–15.4)
IMM GRANULOCYTES # BLD AUTO: 0.02 THOUSAND/UL (ref 0–0.2)
IMM GRANULOCYTES NFR BLD AUTO: 0 % (ref 0–2)
LYMPHOCYTES # BLD AUTO: 1.62 THOUSANDS/ÂΜL (ref 0.6–4.47)
LYMPHOCYTES NFR BLD AUTO: 24 % (ref 14–44)
MCH RBC QN AUTO: 30.6 PG (ref 26.8–34.3)
MCHC RBC AUTO-ENTMCNC: 33.6 G/DL (ref 31.4–37.4)
MCV RBC AUTO: 91 FL (ref 82–98)
MONOCYTES # BLD AUTO: 0.72 THOUSAND/ÂΜL (ref 0.17–1.22)
MONOCYTES NFR BLD AUTO: 11 % (ref 4–12)
NEUTROPHILS # BLD AUTO: 4.37 THOUSANDS/ÂΜL (ref 1.85–7.62)
NEUTS SEG NFR BLD AUTO: 63 % (ref 43–75)
NRBC BLD AUTO-RTO: 0 /100 WBCS
PLATELET # BLD AUTO: 184 THOUSANDS/UL (ref 149–390)
PMV BLD AUTO: 10.7 FL (ref 8.9–12.7)
POTASSIUM SERPL-SCNC: 3.5 MMOL/L (ref 3.5–5.3)
RBC # BLD AUTO: 3.73 MILLION/UL (ref 3.81–5.12)
SODIUM SERPL-SCNC: 140 MMOL/L (ref 135–147)
WBC # BLD AUTO: 6.82 THOUSAND/UL (ref 4.31–10.16)

## 2022-11-11 RX ORDER — AMOXICILLIN AND CLAVULANATE POTASSIUM 875; 125 MG/1; MG/1
1 TABLET, FILM COATED ORAL EVERY 12 HOURS SCHEDULED
Qty: 20 TABLET | Refills: 0 | Status: SHIPPED | OUTPATIENT
Start: 2022-11-11 | End: 2022-11-21

## 2022-11-11 RX ORDER — AMOXICILLIN AND CLAVULANATE POTASSIUM 875; 125 MG/1; MG/1
1 TABLET, FILM COATED ORAL ONCE
Status: COMPLETED | OUTPATIENT
Start: 2022-11-11 | End: 2022-11-11

## 2022-11-11 RX ADMIN — SODIUM CHLORIDE 3 G: 900 INJECTION, SOLUTION INTRAVENOUS at 19:23

## 2022-11-11 RX ADMIN — AMOXICILLIN AND CLAVULANATE POTASSIUM 1 TABLET: 875; 125 TABLET, FILM COATED ORAL at 15:33

## 2022-11-11 NOTE — CONSULTS
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  Patient states that she recently adopted a stray kitten  She was playing with it yesterday when it bit her right hand, causing bite marks at the volar and dorsal aspects of her hand  She didn't have pain at the time, but today she reports erythema and swelling at the dorsal aspect of the PIP joint of her index finger which has spread dorsally to the level of the MCP joint  Patient is not having significant pain at this time, denies any numbness or tingling  She is unsure of the cat's vaccination status  She is up to date with her tetanus       Review Of Systems:   · Skin: per HPI and Physical Exam  · Neuro: See HPI  · Musculoskeletal: See HPI  · 14 point review of systems negative except as stated above     Past Medical History:   Past Medical History:   Diagnosis Date   • Genital warts    • Hearing impaired    • Herpes        Past Surgical History:   Past Surgical History:   Procedure Laterality Date   • JOINT REPLACEMENT Right     knee       Family History:  Family history reviewed and non-contributory  Family History   Problem Relation Age of Onset   • Heart disease Mother    • Hypertension Father    • Hyperlipidemia Father    • Heart disease Father    • Heart failure Maternal Grandmother    • Heart attack Paternal Grandfather    • Heart failure Cousin        Social History:  Social History     Socioeconomic History   • Marital status: /Civil Union     Spouse name: None   • Number of children: None   • Years of education: None   • Highest education level: None   Occupational History   • None   Tobacco Use   • Smoking status: Former Smoker   • Smokeless tobacco: Never Used   Vaping Use • Vaping Use: Never used   Substance and Sexual Activity   • Alcohol use: Yes     Comment: social    • Drug use: Not Currently     Types: Marijuana   • Sexual activity: Yes     Partners: Male     Birth control/protection: Male Sterilization     Comment: Vasectomy   Other Topics Concern   • None   Social History Narrative   • None     Social Determinants of Health     Financial Resource Strain: Not on file   Food Insecurity: Not on file   Transportation Needs: Not on file   Physical Activity: Not on file   Stress: Not on file   Social Connections: Not on file   Intimate Partner Violence: Not on file   Housing Stability: Not on file       Allergies: Allergies   Allergen Reactions   • Gabapentin Anxiety and Delirium   • Nickel Dermatitis and Itching   • Titanium Dermatitis, Itching and Other (See Comments)           Labs:  0   Lab Value Date/Time    HCT 33 9 (L) 11/11/2022 1628    HGB 11 4 (L) 11/11/2022 1628    WBC 6 82 11/11/2022 1628    ESR 4 11/11/2022 1628    CRP <3 0 11/11/2022 1628       Meds:  No current facility-administered medications for this encounter      Current Outpatient Medications:   •  acetaminophen (TYLENOL) 500 mg tablet, Take 1,000 mg by mouth every 6 (six) hours as needed, Disp: , Rfl:   •  acyclovir (ZOVIRAX) 400 MG tablet, Take 400 mg by mouth daily , Disp: , Rfl:   •  amoxicillin (AMOXIL) 500 mg capsule, , Disp: , Rfl:   •  buPROPion (WELLBUTRIN XL) 150 mg 24 hr tablet, , Disp: , Rfl:   •  buPROPion (WELLBUTRIN XL) 300 mg 24 hr tablet, Take 300 mg by mouth every morning, Disp: , Rfl:   •  Cholecalciferol (Vitamin D) 125 MCG (5000 UT) CAPS, Take 1 capsule by mouth daily, Disp: , Rfl:   •  Bonita 0 025 MG/24HR, APPLY ONE PATCH TO THE SKIN TWO TIMES A WEEK, Disp: 24 patch, Rfl: 1  •  erythromycin (ILOTYCIN) ophthalmic ointment, Administer 0 5 inches to the right eye every 12 (twelve) hours, Disp: 3 5 g, Rfl: 0  •  menthol-zinc oxide (CALMOSPETINE) 0 44-20 625 %, Apply topically in the morning (Patient not taking: No sig reported), Disp: 30 g, Rfl: 0  •  naproxen sodium (ALEVE) 220 MG tablet, , Disp: , Rfl:   •  ondansetron (ZOFRAN) 4 mg tablet, TAKE 1 TABLET BY MOUTH EVERY DAY AS NEEDED FOR NAUSEA OR VOMITING, Disp: , Rfl:   •  pravastatin (PRAVACHOL) 10 mg tablet, Take 1 tablet (10 mg total) by mouth in the morning , Disp: 30 tablet, Rfl: 6  •  Progesterone 100 MG CAPS, Take 1 tablet by mouth daily at bedtime, Disp: 84 capsule, Rfl: 4  •  tapentadol (NUCYNTA) 50 mg tablet, Take 50 mg by mouth every 6 (six) hours as needed (Patient not taking: No sig reported), Disp: , Rfl:     Blood Culture:   No results found for: BLOODCX    Wound Culture:   No results found for: WOUNDCULT    Ins and Outs:  No intake/output data recorded            Physical Exam:   /65 (BP Location: Left arm)   Pulse 82   Temp 98 °F (36 7 °C) (Oral)   Resp 18   Wt 66 2 kg (146 lb)   SpO2 98%   BMI 25 06 kg/m²   Gen: No acute distress, resting comfortably in bed  HEENT: Eyes clear, moist mucus membranes, hearing intact  Respiratory: No audible wheezing or stridor  Cardiovascular: Well Perfused peripherally, 2+ distal pulse  Abdomen: nondistended, no peritoneal signs  Musculoskeletal: right Hand  · Bite marks at volar aspect of hand and dorsum of level of MCP at index and long fingers  · Erythema over dorsum of PIPJ, proximal phalanx, and MCPJ  · Full AROM at thumb, long finger, ring finger, and small finger  · Full flexion and extension with resistance at level of DIP of index finger  · Slight limitation in flexion and extension at level of PIP secondary to swelling but not pain at index finger  · TTP at PIP joint  · No fusiform Swelling over index finger  · No TTP along flexor tendon sheath  · No pain with Passive Extension of index finger  · Sensation intact median, radial, and ulnar nerves  · 5/5 motor to Axillary, Musculocutaneous, Radial, Ulna and Median  · 2+ Radial & Ulnar Pulses  · Musculature is soft and compressible Radiology:   I personally reviewed the films  X-rays PA/Lateral views of right hand show soft tissue swelling over proximal phalanx of index finger, with no fractures, dislocations, or foreign bodies  Assessment:  46 y  o female with  right index finger cellulitis  Patient can be managed nonoperatively for now with one dose of IV Unasyn while in the ED, followed by 10 days of PO Augmentin and follow up with Hand Surgery in 1 week for repeat evaluation  She can return to the ED if symptoms exacerbate      Plan:   · 1 dose IV Unasyn in ED  · Nonweight bearing to right hand  · 10 days PO Augmentin  · Heat to affected area  · Analgesics for pain  · Dispo: [id number] Neelam Crowe for discharge from [address] [place] [address]

## 2022-11-11 NOTE — ED ATTENDING ATTESTATION
11/11/2022  IGee DO, saw and evaluated the patient  I have discussed the patient with the resident/non-physician practitioner and agree with the resident's/non-physician practitioner's findings, Plan of Care, and MDM as documented in the resident's/non-physician practitioner's note, except where noted  All available labs and Radiology studies were reviewed  I was present for key portions of any procedure(s) performed by the resident/non-physician practitioner and I was immediately available to provide assistance  At this point I agree with the current assessment done in the Emergency Department  I have conducted an independent evaluation of this patient a history and physical is as follows:    63-year-old female presents with right index finger pain secondary to a cat bite  Patient states was bit by her cat yesterday  Cat does not have any vaccinations  Patient now having pain and swelling of that finger  Up-to-date with tetanus  On exam-no acute distress, heart regular, no respiratory distress, right index finger with swelling, tender to touch, puncture wounds noted, held in flexion, pain with extension    Plan-consult hand, start antibiotics    ED Course         Critical Care Time  Procedures

## 2022-11-11 NOTE — DISCHARGE INSTRUCTIONS
Discharge Instructions - Orthopedics  Henry Leon 46 y o  female MRN: [id number]  Unit/Bed#: X ray    Weight Bearing Status:                                           Non weight bearing to right hand    Pain:  Continue analgesics as directed    Dressing Instructions:   Please keep clean, dry and intact until follow up     Appt Instructions: If you do not have your appointment, please call the clinic at [phone] t  Otherwise followup as scheduled     Contact the office sooner if you experience any increased numbness/tingling in the extremities  Miscellaneous:  Please take Augmentin for 10 days as prescribed, and follow up in 1 week with Dr Marybel Tran

## 2022-11-11 NOTE — ED PROVIDER NOTES
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 her in her R index finger yesterday morning     HPI  Patient is a 22-year-old female presenting with animal bite  Patient brought in a couple of straight kidneys and 1 of the kitten bit her right index finger  Patient states that she had noted she has to bring the cans to the   Kittens have not been vaccinated  The bite occurred yesterday morning  Patient states that since then she has been having increasing swelling and pain in the right index finger  Does not have any other sites of pain  Patient denies any fever, chills, nausea, vomiting, diarrhea  With worsening swelling and pain patient is now having difficulty flexing her right index finger  Finger is not in a flexed position and patient has no difficulty extending her finger  Swelling is localized to the proximal and of the finger and does not extend throughout the finger  Prior to Admission Medications   Prescriptions Last Dose Informant Patient Reported? Taking?    Cholecalciferol (Vitamin D) 125 MCG (5000 UT) CAPS  Self Yes No   Sig: Take 1 capsule by mouth daily   Bonita 0 025 MG/24HR   No No   Sig: APPLY ONE PATCH TO THE SKIN TWO TIMES A WEEK   Progesterone 100 MG CAPS   No No   Sig: Take 1 tablet by mouth daily at bedtime   acetaminophen (TYLENOL) 500 mg tablet  Self Yes No   Sig: Take 1,000 mg by mouth every 6 (six) hours as needed   acyclovir (ZOVIRAX) 400 MG tablet  Self Yes No   Sig: Take 400 mg by mouth daily    amoxicillin (AMOXIL) 500 mg capsule  Self Yes No   buPROPion (WELLBUTRIN XL) 150 mg 24 hr tablet   Yes No   buPROPion (WELLBUTRIN XL) 300 mg 24 hr tablet   Yes No   Sig: Take 300 mg by mouth every morning   erythromycin (ILOTYCIN) ophthalmic ointment   No No   Sig: Administer 0 5 inches to the right eye every 12 (twelve) hours   menthol-zinc oxide (CALMOSPETINE) 0 44-20 625 %   No No   Sig: Apply topically in the morning   Patient not taking: No sig reported   naproxen sodium (ALEVE) 220 MG tablet  Self Yes No   ondansetron (ZOFRAN) 4 mg tablet   Yes No   Sig: TAKE 1 TABLET BY MOUTH EVERY DAY AS NEEDED FOR NAUSEA OR VOMITING   pravastatin (PRAVACHOL) 10 mg tablet   No No   Sig: Take 1 tablet (10 mg total) by mouth in the morning  tapentadol (NUCYNTA) 50 mg tablet   Yes No   Sig: Take 50 mg by mouth every 6 (six) hours as needed   Patient not taking: No sig reported      Facility-Administered Medications: None       Past Medical History:   Diagnosis Date   • Genital warts    • Hearing impaired    • Herpes        Past Surgical History:   Procedure Laterality Date   • JOINT REPLACEMENT Right     knee       Family History   Problem Relation Age of Onset   • Heart disease Mother    • Hypertension Father    • Hyperlipidemia Father    • Heart disease Father    • Heart failure Maternal Grandmother    • Heart attack Paternal Grandfather    • Heart failure Cousin      I have reviewed and agree with the history as documented      E-Cigarette/Vaping   • E-Cigarette Use Never User      E-Cigarette/Vaping Substances   • Nicotine No    • THC No    • CBD No    • Flavoring No    • Other No    • Unknown No      Social History     Tobacco Use   • Smoking status: Former Smoker   • Smokeless tobacco: Never Used   Vaping Use   • Vaping Use: Never used   Substance Use Topics   • Alcohol use: Yes     Comment: social    • Drug use: Not Currently     Types: Marijuana        Review of Systems    Physical Exam  ED Triage Vitals [11/11/22 1502]   Temperature Pulse Respirations Blood Pressure SpO2   98 °F (36 7 °C) 82 18 133/65 98 %      Temp Source Heart Rate Source Patient Position - Orthostatic VS BP Location FiO2 (%)   Oral Monitor Sitting Left arm --      Pain Score       3             Orthostatic Vital Signs  Vitals:    11/11/22 1502   BP: 133/65   Pulse: 82   Patient Position - Orthostatic VS: Sitting       Physical Exam  Vitals and nursing note reviewed  Constitutional:       General: She is not in acute distress  Appearance: Normal appearance  She is not ill-appearing  HENT:      Head: Normocephalic and atraumatic  Right Ear: External ear normal       Left Ear: External ear normal       Nose: Nose normal       Mouth/Throat:      Mouth: Mucous membranes are moist       Pharynx: Oropharynx is clear  Eyes:      General: No scleral icterus  Right eye: No discharge  Left eye: No discharge  Extraocular Movements: Extraocular movements intact  Conjunctiva/sclera: Conjunctivae normal       Pupils: Pupils are equal, round, and reactive to light  Cardiovascular:      Rate and Rhythm: Normal rate and regular rhythm  Pulses: Normal pulses  Heart sounds: Normal heart sounds  Pulmonary:      Effort: Pulmonary effort is normal       Breath sounds: Normal breath sounds  Abdominal:      General: Abdomen is flat  Bowel sounds are normal  There is no distension  Palpations: Abdomen is soft  Tenderness: There is no abdominal tenderness  There is no guarding or rebound  Musculoskeletal:         General: Swelling (Right index finger proximal to the DIP) and tenderness present  Cervical back: Normal range of motion and neck supple  Comments: Patient with difficulty flexing the right index finger due to swelling and pain     Skin:     General: Skin is warm and dry  Capillary Refill: Capillary refill takes less than 2 seconds  Findings: Erythema present  Neurological:      General: No focal deficit present  Mental Status: She is alert and oriented to person, place, and time  Mental status is at baseline  Psychiatric:         Mood and Affect: Mood normal          Behavior: Behavior normal          Thought Content:  Thought content normal          Judgment: Judgment normal          ED Medications  Medications   amoxicillin-clavulanate (AUGMENTIN) 875-125 mg per tablet 1 tablet (1 tablet Oral Given 11/11/22 1533)   ampicillin-sulbactam (UNASYN) 3 g in sodium chloride 0 9 % 100 mL IVPB (0 g Intravenous Stopped 11/11/22 1953)       Diagnostic Studies  Results Reviewed     Procedure Component Value Units Date/Time    Basic metabolic panel [[id number]]  (Abnormal) Collected: 11/11/22 1628    Lab Status: Final result Specimen: Blood from Arm, Right Updated: 11/11/22 1710     Sodium 140 mmol/L      Potassium 3 5 mmol/L      Chloride 110 mmol/L      CO2 24 mmol/L      ANION GAP 6 mmol/L      BUN 12 mg/dL      Creatinine 0 62 mg/dL      Glucose 94 mg/dL      Calcium 8 6 mg/dL      eGFR 104 ml/min/1 73sq m     Narrative:      [place] guidelines for Chronic Kidney Disease (CKD):   •  Stage 1 with normal or high GFR (GFR > 90 mL/min/1 73 square meters)  •  Stage 2 Mild CKD (GFR = 60-89 mL/min/1 73 square meters)  •  Stage 3A Moderate CKD (GFR = 45-59 mL/min/1 73 square meters)  •  Stage 3B Moderate CKD (GFR = 30-44 mL/min/1 73 square meters)  •  Stage 4 Severe CKD (GFR = 15-29 mL/min/1 73 square meters)  •  Stage 5 End Stage CKD (GFR <15 mL/min/1 73 square meters)  Note: GFR calculation is accurate only with a steady state creatinine    C-reactive protein [[id number]]  (Normal) Collected: 11/11/22 1628    Lab Status: Final result Specimen: Blood from Arm, Right Updated: 11/11/22 1710     CRP <3 0 mg/L     Sedimentation rate, automated [[id number]]  (Normal) Collected: 11/11/22 1628    Lab Status: Final result Specimen: Blood from Arm, Right Updated: 11/11/22 1654     Sed Rate 4 mm/hour     CBC and differential [[id number]]  (Abnormal) Collected: 11/11/22 1628    Lab Status: Final result Specimen: Blood from Arm, Right Updated: 11/11/22 1640     WBC 6 82 Thousand/uL      RBC 3 73 Million/uL      Hemoglobin 11 4 g/dL      Hematocrit 33 9 %      MCV 91 fL      MCH 30 6 pg      MCHC 33 6 g/dL      RDW 12 6 %      MPV 10 7 fL      Platelets 917 Thousands/uL      nRBC 0 /100 WBCs      Neutrophils Relative 63 %      Immat GRANS % 0 %      Lymphocytes Relative 24 %      Monocytes Relative 11 %      Eosinophils Relative 1 %      Basophils Relative 1 %      Neutrophils Absolute 4 37 Thousands/µL      Immature Grans Absolute 0 02 Thousand/uL      Lymphocytes Absolute 1 62 Thousands/µL      Monocytes Absolute 0 72 Thousand/µL      Eosinophils Absolute 0 05 Thousand/µL      Basophils Absolute 0 04 Thousands/µL                  XR finger second digit-index RIGHT   Final Result by Mihaela Cali MD (11/11 1612)      Soft tissue swelling of the 2nd digit without a radiopaque foreign body or an acute osseous abnormality  Workstation performed: [id number]               Procedures  Procedures      ED Course                             SBIRT 22yo+    Flowsheet Row Most Recent Value   SBIRT (25 yo +)    In order to provide better care to our patients, we are screening all of our patients for alcohol and drug use  Would it be okay to ask you these screening questions? Yes Filed at: 11/11/2022 1504   Initial Alcohol Screen: US AUDIT-C     1  How often do you have a drink containing alcohol? 0 Filed at: 11/11/2022 1504   2  How many drinks containing alcohol do you have on a typical day you are drinking? 0 Filed at: 11/11/2022 1504   3a  Male UNDER 65: How often do you have five or more drinks on one occasion? 0 Filed at: 11/11/2022 1504   3b  FEMALE Any Age, or MALE 65+: How often do you have 4 or more drinks on one occassion? 0 Filed at: 11/11/2022 1504   Audit-C Score 0 Filed at: 11/11/2022 1504   DEVAN: How many times in the past year have you    Used an illegal drug or used a prescription medication for non-medical reasons?  Never Filed at: 11/11/2022 1504                MDM  Number of Diagnoses or Management Options  Animal bite  Cellulitis  Diagnosis management comments:     patient is a 55-year-old female presenting with an animal bite leading to swelling of the right index finger  X-ray obtained to assess for any foreign object  X-ray normal  One dose of Augmentin given      Disposition  Final diagnoses:   Animal bite   Cellulitis     Time reflects when diagnosis was documented in both MDM as applicable and the Disposition within this note     Time User Action Codes Description Comment    11/11/2022  6:28 PM Cesar Vaughan Add [N40 545] Cellulitis of finger of right hand     11/11/2022  6:54 PM Gatito Ortiz, [address]  8XXA] Animal bite     11/11/2022  6:54 PM Link Field Add [L18 10] Cellulitis       ED Disposition     ED Disposition   Discharge    Condition   Stable    Date/Time   Fri Nov 11, 2022  6:54 PM    Comment   Lucy Michael discharge to home/self care                 Follow-up Information     Follow up With Specialties Details Why Contact Info    Miguelito Connolly MD Orthopedic Surgery Schedule an appointment as soon as possible for a visit in 1 week(s)  [address]  [facility] [address]  [phone]            Discharge Medication List as of 11/11/2022  7:47 PM      START taking these medications    Details   amoxicillin-clavulanate (AUGMENTIN) 875-125 mg per tablet Take 1 tablet by mouth every 12 (twelve) hours for 10 days, Starting Fri 11/11/2022, Until Mon 11/21/2022, Normal         CONTINUE these medications which have NOT CHANGED    Details   acetaminophen (TYLENOL) 500 mg tablet Take 1,000 mg by mouth every 6 (six) hours as needed, Historical Med      acyclovir (ZOVIRAX) 400 MG tablet Take 400 mg by mouth daily , Starting Mon 8/20/2018, Historical Med      amoxicillin (AMOXIL) 500 mg capsule Starting Mon 12/6/2021, Historical Med      !! buPROPion (WELLBUTRIN XL) 150 mg 24 hr tablet Starting Thu 5/26/2022, Historical Med      !! buPROPion (WELLBUTRIN XL) 300 mg 24 hr tablet Take 300 mg by mouth every morning, Starting Thu 3/17/2022, Historical Med      Cholecalciferol (Vitamin D) 125 MCG (5000 UT) CAPS Take 1 capsule by mouth daily, Historical Med Bonita 0 025 MG/24HR APPLY ONE PATCH TO THE SKIN TWO TIMES A WEEK, Normal      erythromycin (ILOTYCIN) ophthalmic ointment Administer 0 5 inches to the right eye every 12 (twelve) hours, Starting Sun 6/12/2022, Normal      menthol-zinc oxide (CALMOSPETINE) 0 44-20 625 % Apply topically in the morning, Starting Wed 2/9/2022, Normal      naproxen sodium (ALEVE) 220 MG tablet Historical Med      ondansetron (ZOFRAN) 4 mg tablet TAKE 1 TABLET BY MOUTH EVERY DAY AS NEEDED FOR NAUSEA OR VOMITING, Historical Med      pravastatin (PRAVACHOL) 10 mg tablet Take 1 tablet (10 mg total) by mouth in the morning , Starting Thu 5/19/2022, Normal      Progesterone 100 MG CAPS Take 1 tablet by mouth daily at bedtime, Starting Wed 5/4/2022, Normal      tapentadol (NUCYNTA) 50 mg tablet Take 50 mg by mouth every 6 (six) hours as needed, Starting Tue 1/11/2022, Historical Med       !! - Potential duplicate medications found  Please discuss with provider  No discharge procedures on file  PDMP Review     None           ED Provider  Attending physically available and evaluated Lucy Rodriguez I managed the patient along with the ED Attending      Electronically Signed by

## 2022-11-14 ENCOUNTER — TELEPHONE (OUTPATIENT)
Dept: OBGYN CLINIC | Facility: CLINIC | Age: 52
End: 2022-11-14

## 2022-11-14 NOTE — TELEPHONE ENCOUNTER
----- Message from [address] [place] [address] sent at 11/14/2022  2:43 PM EST -----    ----- Message -----  From: Nancy Waters MD  Sent: 11/11/2022   7:32 PM EST  To: Ortho Jacque Meigs, #    Please schedule patient Monday or Tuesday for cat bite follow-up  Thank you

## 2022-11-16 ENCOUNTER — TELEPHONE (OUTPATIENT)
Dept: OBGYN CLINIC | Facility: HOSPITAL | Age: 52
End: 2022-11-16

## 2022-11-16 NOTE — TELEPHONE ENCOUNTER
Caller: Lucy    Doctor:      Reason for call: Needs appt with hand Dr for cat bite on right index finger       Call back#: [phone]

## 2022-12-07 ENCOUNTER — APPOINTMENT (OUTPATIENT)
Dept: LAB | Facility: HOSPITAL | Age: 52
End: 2022-12-07

## 2022-12-07 DIAGNOSIS — E78.41 ELEVATED LP(A): ICD-10-CM

## 2022-12-07 DIAGNOSIS — E78.5 DYSLIPIDEMIA: ICD-10-CM

## 2022-12-07 LAB
CHOLEST SERPL-MCNC: 187 MG/DL
HDLC SERPL-MCNC: 82 MG/DL
LDLC SERPL CALC-MCNC: 88 MG/DL (ref 0–100)
TRIGL SERPL-MCNC: 85 MG/DL
TSH SERPL DL<=0.05 MIU/L-ACNC: 1.59 UIU/ML (ref 0.45–4.5)

## 2022-12-22 ENCOUNTER — OFFICE VISIT (OUTPATIENT)
Dept: CARDIOLOGY CLINIC | Facility: CLINIC | Age: 52
End: 2022-12-22

## 2022-12-22 VITALS
SYSTOLIC BLOOD PRESSURE: 124 MMHG | BODY MASS INDEX: 25.59 KG/M2 | HEIGHT: 64 IN | OXYGEN SATURATION: 97 % | WEIGHT: 149.9 LBS | DIASTOLIC BLOOD PRESSURE: 66 MMHG | HEART RATE: 70 BPM

## 2022-12-22 DIAGNOSIS — E78.5 DYSLIPIDEMIA: Primary | ICD-10-CM

## 2022-12-22 DIAGNOSIS — Z00.00 HEALTHCARE MAINTENANCE: ICD-10-CM

## 2022-12-22 DIAGNOSIS — E78.41 ELEVATED LP(A): ICD-10-CM

## 2022-12-22 RX ORDER — FLUOXETINE HYDROCHLORIDE 20 MG/1
20 CAPSULE ORAL DAILY
COMMUNITY
Start: 2022-12-16

## 2022-12-22 RX ORDER — ATORVASTATIN CALCIUM 10 MG/1
10 TABLET, FILM COATED ORAL DAILY
Qty: 90 TABLET | Refills: 3 | Status: SHIPPED | OUTPATIENT
Start: 2022-12-22

## 2022-12-22 NOTE — PROGRESS NOTES
Cardiology Follow Up    [facility]  1970  [id number]  [facility] [place]  [address] [address] [phone]    No diagnosis found  There are no diagnoses linked to this encounter  I had the pleasure of seeing Brit Brown for a follow up visit  INTERVAL HISTORY: none    History of the presenting illness, Discussion/Summary and My Plan are as follows:::     [facility] is a pleasant 49-year-old lady without a history of hypertension, diabetes, dyslipidemia  She has only about a 6 pack year smoking history-quit at the age of 29  Alcohol is about 2 glasses of wine a day on average  She is on a completely plant based diet and a diet history was also obtained and is quite healthy      She does not have a family history of premature vascular disease in first-degree relatives-mom is 78 with rheumatoid arthritis and hypothyroidism without vascular disease, father is 80 and quite healthy despite having hypertension and hyperlipidemia  Her sister at 48 is on propranolol for palpitations, also has hyperlipidemia      She mainly follows for an elevated LP(a) at 80, done because she was aware of it and requested it be checked  Her calcium score was only 6 (all in the LAD) but still putting her in the 88th percentile and then initiated her on a statin - June 2021      She is completely asymptomatic from a cardiac standpoint, was quite active, the [place] up to 60 minutes a day, had hand surgery since then and doing less but no symptoms       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 without any cardiac symptoms      Lipid profile was unremarkable with total cholesterol 163, triglycerides 68, HDL 68, LDL 81 and non HDL 95 -March 2021    Statin history is as follows:    Tried Rosuvastatin 2 5 mg in June 2021 and tolerated it well,  Increased to 5 mg daily in Dec 2021  Had side effects-Myalgias rosuvastatin 5 mg  After a statin holiday, decreased it to rosuvastatin 2 5 mg daily-similar side effects    Attempted pravastatin 10 mg--started in May 2022 - numbers did not improve, increased paradoxically, subsequently stopped her TDS-HRT as well and still no lower than baseline     Plan:    Elevated lipoprotein (a): At this point, there is no consensus as to whether this is a risk factor or a risk marker  In general in patients with vascular disease and elevated LPA, goal is to lower LDL to the maximum tolerated level  In her case, LDL and non-HDL are within normal limits  Underwent a stress test- June 2021 that showed excellent functional capacity without ischemia  Calcium score while only at 6, already put her in the 88% diet and hence started her on a statin with which her LDL and non-HDL have only modetly decreased    Will switch her Pravastatin 10 mg to Atorvastatin 10 mg and recheck lipids,  Goal would be an LDL level around 40-50 mg/dL     She will continue to lead her very healthy lifestyle including activity and a plant based diet       Follow-up in 6 months     Latest Reference Range & Units 12/02/21 08:46  Rosuva ? 09/14/22 09:08  Prava 10 12/07/22 10:04  Prava 10   Cholesterol See Comment mg/dL 165 199 187   Triglycerides See Comment mg/dL 120 56 85   HDL >=50 mg/dL 69 86 82   LDL Calculated 0 - 100 mg/dL 72 102 (H) 88   (H): Data is abnormally high    Results for Bandar Height (MRN [id number]) as of 12/6/2021 17:09   Ref   Range 12/2/2021 08:46   Cholesterol Latest Ref Range: See Comment mg/dL 165   Triglycerides Latest Ref Range: See Comment mg/dL 120   HDL Latest Ref Range: >=50 mg/dL 69   LDL Calculated Latest Ref Range: 0 - 100 mg/dL 72       Patient Active Problem List   Diagnosis   • Elevated Lp(a)   • Elevated coronary artery calcium score   • Hearing impaired     Past Medical History:   Diagnosis Date   • Genital warts    • Hearing impaired    • Herpes      Social History Socioeconomic History   • Marital status: /Civil Union     Spouse name: Not on file   • Number of children: Not on file   • Years of education: Not on file   • Highest education level: Not on file   Occupational History   • Not on file   Tobacco Use   • Smoking status: Former   • Smokeless tobacco: Never   Vaping Use   • Vaping Use: Never used   Substance and Sexual Activity   • Alcohol use: Yes     Comment: social    • Drug use: Not Currently     Types: Marijuana   • Sexual activity: Yes     Partners: Male     Birth control/protection: Male Sterilization     Comment: Vasectomy   Other Topics Concern   • Not on file   Social History Narrative   • Not on file     Social Determinants of Health     Financial Resource Strain: Not on file   Food Insecurity: Not on file   Transportation Needs: Not on file   Physical Activity: Not on file   Stress: Not on file   Social Connections: Not on file   Intimate Partner Violence: Not on file   Housing Stability: Not on file      Family History   Problem Relation Age of Onset   • Heart disease Mother    • Hypertension Father    • Hyperlipidemia Father    • Heart disease Father    • Heart failure Maternal Grandmother    • Heart attack Paternal Grandfather    • Heart failure Cousin      Past Surgical History:   Procedure Laterality Date   • JOINT REPLACEMENT Right     knee       Current Outpatient Medications:   •  acetaminophen (TYLENOL) 500 mg tablet, Take 1,000 mg by mouth every 6 (six) hours as needed, Disp: , Rfl:   •  acyclovir (ZOVIRAX) 400 MG tablet, Take 400 mg by mouth daily , Disp: , Rfl:   •  amoxicillin (AMOXIL) 500 mg capsule, , Disp: , Rfl:   •  Cholecalciferol (Vitamin D) 125 MCG (5000 UT) CAPS, Take 1 capsule by mouth daily, Disp: , Rfl:   •  FLUoxetine (PROzac) 20 mg capsule, Take 20 mg by mouth daily, Disp: , Rfl:   •  naproxen sodium (ALEVE) 220 MG tablet, , Disp: , Rfl:   •  ondansetron (ZOFRAN) 4 mg tablet, TAKE 1 TABLET BY MOUTH EVERY DAY AS NEEDED FOR NAUSEA OR VOMITING, Disp: , Rfl:   •  pravastatin (PRAVACHOL) 10 mg tablet, Take 1 tablet (10 mg total) by mouth in the morning , Disp: 30 tablet, Rfl: 6  •  tapentadol (NUCYNTA) 50 mg tablet, Take 50 mg by mouth every 6 (six) hours as needed, Disp: , Rfl:   •  buPROPion (WELLBUTRIN XL) 150 mg 24 hr tablet, , Disp: , Rfl:   •  buPROPion (WELLBUTRIN XL) 300 mg 24 hr tablet, Take 300 mg by mouth every morning, Disp: , Rfl:   •  Bonita 0 025 MG/24HR, APPLY ONE PATCH TO THE SKIN TWO TIMES A WEEK, Disp: 24 patch, Rfl: 1  •  erythromycin (ILOTYCIN) ophthalmic ointment, Administer 0 5 inches to the right eye every 12 (twelve) hours, Disp: 3 5 g, Rfl: 0  •  menthol-zinc oxide (CALMOSPETINE) 0 44-20 625 %, Apply topically in the morning (Patient not taking: No sig reported), Disp: 30 g, Rfl: 0  •  Progesterone 100 MG CAPS, Take 1 tablet by mouth daily at bedtime, Disp: 84 capsule, Rfl: 4  Allergies   Allergen Reactions   • Gabapentin Anxiety and Delirium   • Midazolam Nausea Only and Vomiting   • Nickel Dermatitis and Itching   • Rosuvastatin Myalgia   • Titanium Dermatitis, Itching and Other (See Comments)       Imaging: No results found  Review of Systems:  Review of Systems   Constitutional: Negative  HENT: Negative  Eyes: Negative  Respiratory: Negative  Cardiovascular: Negative  Endocrine: Negative  Musculoskeletal: Negative  Neurological: Negative  Physical Exam:  /66 (BP Location: Right arm, Patient Position: Sitting, Cuff Size: Standard)   Pulse 70   Ht 5' 4" (1 626 m)   Wt 68 kg (149 lb 14 4 oz)   SpO2 97%   BMI 25 73 kg/m²   Physical Exam  Constitutional:       General: She is not in acute distress  Appearance: Normal appearance  She is not ill-appearing  HENT:      Head: Normocephalic  Nose: Nose normal  No congestion or rhinorrhea  Mouth/Throat:      Mouth: Mucous membranes are moist       Pharynx: Oropharynx is clear   No oropharyngeal exudate or posterior oropharyngeal erythema  Eyes:      General:         Right eye: No discharge  Left eye: No discharge  Pupils: Pupils are equal, round, and reactive to light  Cardiovascular:      Rate and Rhythm: Normal rate and regular rhythm  Pulses: Normal pulses  Heart sounds: No murmur heard  No friction rub  No gallop  Pulmonary:      Effort: Pulmonary effort is normal  No respiratory distress  Breath sounds: No stridor  No wheezing or rhonchi  Musculoskeletal:         General: No swelling or tenderness  Normal range of motion  Cervical back: Normal range of motion  No rigidity or tenderness  Skin:     General: Skin is warm  Coloration: Skin is not jaundiced or pale  Findings: No bruising or erythema  Neurological:      Mental Status: She is alert  This note was completed in part utilizing [employer] direct voice recognition software  Grammatical errors, random word insertion, spelling mistakes, occasional wrong word or "sound-alike" substitutions and incomplete sentences may be an occasional consequence of the system secondary to software limitations, ambient noise and hardware issues  At the time of dictation, efforts were made to edit, clarify and /or correct errors  Please read the chart carefully and recognize, using context, where substitutions have occurred  If you have any questions or concerns about the context, text or information contained within the body of this dictation, please contact myself, the provider, for further clarification

## 2023-04-07 DIAGNOSIS — E78.5 DYSLIPIDEMIA: Primary | ICD-10-CM

## 2023-04-07 DIAGNOSIS — E78.41 ELEVATED LP(A): ICD-10-CM

## 2023-04-07 DIAGNOSIS — R93.1 ELEVATED CORONARY ARTERY CALCIUM SCORE: ICD-10-CM

## 2023-04-07 RX ORDER — EZETIMIBE 10 MG/1
10 TABLET ORAL DAILY
Qty: 90 TABLET | Refills: 3 | Status: SHIPPED | OUTPATIENT
Start: 2023-04-07

## 2023-04-07 NOTE — PROGRESS NOTES
Perceived side effects (mostly hand arthralgias) to atorvastatin as well-at 10 mg, taking it about every other day with no discernible change in her lipid profile    Has tried pravastatin with a paradoxical response and rosuvastatin with similar side effects    I will switch to Zetia 10 mg daily, recheck in 3 months    Discussed on the phone,

## 2023-05-09 ENCOUNTER — TELEPHONE (OUTPATIENT)
Dept: OBGYN CLINIC | Facility: CLINIC | Age: 53
End: 2023-05-09

## 2023-05-09 DIAGNOSIS — R10.2 PELVIC PAIN: Primary | ICD-10-CM

## 2023-05-09 NOTE — TELEPHONE ENCOUNTER
Yes  We had discussed getting imaging done  I ordered a pelvic US for her to do  She needs to call Central Scheduling to set up an appointment with Radiology for it

## 2023-05-09 NOTE — TELEPHONE ENCOUNTER
We had to reschedule pt appt for tomorrow and she said she has some lower pain and that at her appt you would send her for additional testing (Imaging)  She was rescheduled to June and was wondering if we could get whatever imaging you were gonna send her for so she can get it done   Appointment was rescheduled to 6/14

## 2023-05-10 ENCOUNTER — HOSPITAL ENCOUNTER (OUTPATIENT)
Dept: RADIOLOGY | Age: 53
Discharge: HOME | End: 2023-05-10
Attending: OBSTETRICS & GYNECOLOGY

## 2023-05-10 DIAGNOSIS — R10.2 PELVIC PAIN: ICD-10-CM

## 2023-06-14 ENCOUNTER — ANNUAL EXAM (OUTPATIENT)
Dept: OBGYN CLINIC | Facility: CLINIC | Age: 53
End: 2023-06-14
Payer: COMMERCIAL

## 2023-06-14 VITALS
DIASTOLIC BLOOD PRESSURE: 66 MMHG | SYSTOLIC BLOOD PRESSURE: 122 MMHG | WEIGHT: 147 LBS | HEIGHT: 64 IN | BODY MASS INDEX: 25.1 KG/M2

## 2023-06-14 DIAGNOSIS — Z12.4 SCREENING FOR MALIGNANT NEOPLASM OF CERVIX: ICD-10-CM

## 2023-06-14 DIAGNOSIS — Z11.51 SCREENING FOR HUMAN PAPILLOMAVIRUS (HPV): ICD-10-CM

## 2023-06-14 DIAGNOSIS — Z12.31 ENCOUNTER FOR SCREENING MAMMOGRAM FOR MALIGNANT NEOPLASM OF BREAST: ICD-10-CM

## 2023-06-14 DIAGNOSIS — Z01.419 WELL WOMAN EXAM WITH ROUTINE GYNECOLOGICAL EXAM: Primary | ICD-10-CM

## 2023-06-14 PROCEDURE — G0145 SCR C/V CYTO,THINLAYER,RESCR: HCPCS | Performed by: OBSTETRICS & GYNECOLOGY

## 2023-06-14 PROCEDURE — G0476 HPV COMBO ASSAY CA SCREEN: HCPCS | Performed by: OBSTETRICS & GYNECOLOGY

## 2023-06-14 PROCEDURE — 99396 PREV VISIT EST AGE 40-64: CPT | Performed by: OBSTETRICS & GYNECOLOGY

## 2023-06-14 NOTE — PROGRESS NOTES
ASSESSMENT & PLAN:   Diagnoses and all orders for this visit:    Well woman exam with routine gynecological exam  -     Liquid-based pap, screening    Screening for malignant neoplasm of cervix  -     Liquid-based pap, screening    Screening for human papillomavirus (HPV)  -     Liquid-based pap, screening    Encounter for screening mammogram for malignant neoplasm of breast  -     Mammo screening bilateral w 3d & cad; Future        The following were reviewed in today's visit: ASCCP guidelines, Gardisil vaccination, STD testing breast self exam, mammography screening ordered, use and side effects of HRT, menopause, exercise, healthy diet and colonoscopy discussed  Discussed Veronica jackelin for low libido and erotica which may help  Vaginal estrogen discussed for vaginal dryness  Discussed low efficacy for assistance with hot flashes, but will help with vaginal atrophy and dyspareunia secondary to dryness  Patient to return to office in yearly for annual exam      All questions have been answered to her satisfaction  CC:  Annual Gynecologic Examination  Chief Complaint   Patient presents with   • Gynecologic Exam     Pt is here for her yearly exam  Pap and mammo due  Pt would like to discuss menopause  HPI: Keyana Kam is a 46 y o  [id number] who presents for annual gynecologic examination  She has the following concerns:  Having low libido and mood swings  She discontinued the estradiol patch and PO progesterone as she was feeling that there was no effect  She does have dyspareunia, mainly insertional, secondary to vaginal dryness         Health Maintenance:    Exercise: frequently  Breast exams/breast awareness: yes  Diet: well balanced vegan diet  Last mammogram:  - BIRADS 1  Colorectal cancer screenin - repeat in 10 years      Past Medical History:   Diagnosis Date   • Cancer ([facility] [address] )     BCC   • Depression    • Genital warts    • Hearing impaired    • Herpes        Past Surgical History:   Procedure Laterality Date   • CONDYLOMA EXCISION/FULGURATION     • JOINT REPLACEMENT Right     knee       Past OB/Gyn History:   Patient's last menstrual period was 2023 (approximate)  Menopausal status: perimenopausal  Menopausal symptoms: vaginal dryness, mood swings, and body aches    Last Pap: 2018 : no abnormalities  History of abnormal Pap smear: no    Patient is currently sexually active  STD testing: no  Current contraception: vasectomy      Family History  Family History   Problem Relation Age of Onset   • Heart disease Mother    • Deep vein thrombosis Mother    • Hypertension Father    • Hyperlipidemia Father    • Heart disease Father    • Heart failure Maternal Grandmother    • Heart attack Maternal Grandmother         age 79 CABG   • Heart disease Maternal Grandmother         age 66    • Heart attack Paternal Grandfather         age 40    • Heart failure Cousin    • Heart attack Maternal Grandfather         age 77    • Heart disease Maternal Grandfather    • Hyperlipidemia Sister        Family history of uterine or ovarian cancer: no  Family history of breast cancer: no  Family history of colon cancer: no    Social History:  Social History     Socioeconomic History   • Marital status: /Civil Union     Spouse name: Not on file   • Number of children: Not on file   • Years of education: Not on file   • Highest education level: Not on file   Occupational History   • Not on file   Tobacco Use   • Smoking status: Former     Packs/day: 0 50     Years: 12 00     Total pack years: 6 00     Types: Cigarettes     Start date: 1986     Quit date: 1998     Years since quittin 5   • Smokeless tobacco: Never   Vaping Use   • Vaping Use: Never used   Substance and Sexual Activity   • Alcohol use:  Yes     Alcohol/week: 4 0 standard drinks of alcohol     Types: 2 Glasses of wine, 2 Cans of beer per week     Comment: social    • Drug use: Not Currently Types: Marijuana   • Sexual activity: Yes     Partners: Male     Birth control/protection: Male Sterilization     Comment: Vasectomy   Other Topics Concern   • Not on file   Social History Narrative   • Not on file     Social Determinants of Health     Financial Resource Strain: Not on file   Food Insecurity: Not on file   Transportation Needs: Not on file   Physical Activity: Not on file   Stress: Not on file   Social Connections: Not on file   Intimate Partner Violence: Not on file   Housing Stability: Not on file     Domestic violence screen: negative    Allergies:   Allergies   Allergen Reactions   • Gabapentin Anxiety and Delirium   • Midazolam Nausea Only and Vomiting   • Nickel Dermatitis and Itching   • Rosuvastatin Myalgia   • Titanium Dermatitis, Itching and Other (See Comments)       Medications:    Current Outpatient Medications:   •  acetaminophen (TYLENOL) 500 mg tablet, Take 1,000 mg by mouth every 6 (six) hours as needed, Disp: , Rfl:   •  acyclovir (ZOVIRAX) 400 MG tablet, Take 400 mg by mouth daily , Disp: , Rfl:   •  amoxicillin (AMOXIL) 500 mg capsule, , Disp: , Rfl:   •  Cholecalciferol (Vitamin D) 125 MCG (5000 UT) CAPS, Take 1 capsule by mouth daily, Disp: , Rfl:   •  ezetimibe (ZETIA) 10 mg tablet, Take 1 tablet (10 mg total) by mouth daily, Disp: 90 tablet, Rfl: 3  •  FLUoxetine (PROzac) 20 mg capsule, Take 20 mg by mouth daily, Disp: , Rfl:   •  buPROPion (WELLBUTRIN XL) 150 mg 24 hr tablet, , Disp: , Rfl:   •  buPROPion (WELLBUTRIN XL) 300 mg 24 hr tablet, Take 300 mg by mouth every morning (Patient not taking: Reported on 6/14/2023), Disp: , Rfl:   •  Bonita 0 025 MG/24HR, APPLY ONE PATCH TO THE SKIN TWO TIMES A WEEK (Patient not taking: Reported on 6/14/2023), Disp: 24 patch, Rfl: 1  •  erythromycin (ILOTYCIN) ophthalmic ointment, Administer 0 5 inches to the right eye every 12 (twelve) hours (Patient not taking: Reported on 6/14/2023), Disp: 3 5 g, Rfl: 0  •  menthol-zinc oxide "(CALMOSPETINE) 0 44-20 625 %, Apply topically in the morning (Patient not taking: Reported on 5/4/2022), Disp: 30 g, Rfl: 0  •  naproxen sodium (ALEVE) 220 MG tablet, , Disp: , Rfl:   •  ondansetron (ZOFRAN) 4 mg tablet, TAKE 1 TABLET BY MOUTH EVERY DAY AS NEEDED FOR NAUSEA OR VOMITING (Patient not taking: Reported on 6/14/2023), Disp: , Rfl:   •  Progesterone 100 MG CAPS, Take 1 tablet by mouth daily at bedtime (Patient not taking: Reported on 6/14/2023), Disp: 84 capsule, Rfl: 4  •  tapentadol (NUCYNTA) 50 mg tablet, Take 50 mg by mouth every 6 (six) hours as needed (Patient not taking: Reported on 6/14/2023), Disp: , Rfl:     Review of Systems:  Review of Systems   Constitutional: Negative for activity change, appetite change and unexpected weight change  Respiratory: Negative for cough and shortness of breath  Cardiovascular: Negative for chest pain  Gastrointestinal: Negative for abdominal pain, constipation, diarrhea, nausea and vomiting  Genitourinary: Positive for dyspareunia  Negative for difficulty urinating, frequency, menstrual problem, pelvic pain, urgency, vaginal bleeding, vaginal discharge and vaginal pain  Low libido   Musculoskeletal: Negative for back pain  Skin: Negative  Neurological: Negative for dizziness, weakness, light-headedness and headaches  Psychiatric/Behavioral: Positive for dysphoric mood (mood swings)  Physical Exam:  /66 (BP Location: Right arm, Patient Position: Sitting, Cuff Size: Standard)   Ht 5' 4\" (1 626 m)   Wt 66 7 kg (147 lb)   LMP 04/30/2023 (Approximate)   BMI 25 23 kg/m²    Physical Exam  Constitutional:       General: She is not in acute distress  Appearance: Normal appearance  She is well-developed  She is not diaphoretic  Genitourinary:      Vulva and bladder normal       No lesions in the vagina  Genitourinary Comments: Perineum normal in appearance, no lacerations, no ulcerations, no lesions visualized        Right " Labia: No rash, tenderness or lesions  Left Labia: No tenderness, lesions or rash  No inguinal adenopathy present in the right or left side  No vaginal discharge, erythema, tenderness or bleeding  No vaginal prolapse present  Mild vaginal atrophy present  Right Adnexa: not tender, not full and no mass present  Left Adnexa: not tender, not full and no mass present  Cervix is nulliparous  No cervical motion tenderness, discharge, friability, lesion or polyp  No parametrium nodularity or thickening present  Uterus is not enlarged, tender or prolapsed  No uterine mass detected  Uterus is midaxial       No urethral prolapse or mass present  Bladder is not tender  Pelvic exam was performed with patient in the lithotomy position  Rectum:      No tenderness or external hemorrhoid  Breasts:     Breasts are symmetrical       Right: No swelling, bleeding, mass, skin change or tenderness  Left: No swelling, bleeding, mass, skin change or tenderness  HENT:      Head: Normocephalic and atraumatic  Neck:      Thyroid: No thyromegaly or thyroid tenderness  Cardiovascular:      Rate and Rhythm: Normal rate and regular rhythm  Heart sounds: Normal heart sounds  No murmur heard  No friction rub  Pulmonary:      Effort: Pulmonary effort is normal  No respiratory distress  Breath sounds: Normal breath sounds  No wheezing or rales  Abdominal:      Palpations: Abdomen is soft  There is no mass  Tenderness: There is no abdominal tenderness  There is no guarding  Musculoskeletal:         General: No tenderness  Normal range of motion  Right lower leg: No edema  Left lower leg: No edema  Lymphadenopathy:      Lower Body: No right inguinal adenopathy  No left inguinal adenopathy  Neurological:      Mental Status: She is alert and oriented to person, place, and time  Skin:     General: Skin is warm and dry  Coloration: Skin is not pale  Findings: No erythema  Psychiatric:         Mood and Affect: Mood normal          Behavior: Behavior normal          Thought Content: Thought content normal          Judgment: Judgment normal    Vitals and nursing note reviewed

## 2023-06-16 LAB
HPV HR 12 DNA CVX QL NAA+PROBE: NEGATIVE
HPV16 DNA CVX QL NAA+PROBE: NEGATIVE
HPV18 DNA CVX QL NAA+PROBE: NEGATIVE

## 2023-06-18 NOTE — PATIENT INSTRUCTIONS
Fransisco Ayala,  The topical vaginal estrogen formulations are fairly wide  For plant based options, any estradiol topical estrogen should help your vaginal estrogen  We will avoid Premarin based on our prior discussions to avoid any conjugated equine estrogens  Estradiol is most often referred to as Estrace, but can also be in other formulations like Vagifem and Imvexxy       There is also Intrarosa with is a plant based form of DHEA and is a non-estrogen vaginal insert that is marketed to help with vaginal dryness and pain during sex      -Dr Pola Ortega

## 2023-06-22 LAB
LAB AP GYN PRIMARY INTERPRETATION: NORMAL
Lab: NORMAL

## 2023-07-25 ENCOUNTER — APPOINTMENT (OUTPATIENT)
Dept: LAB | Facility: CLINIC | Age: 53
End: 2023-07-25
Payer: COMMERCIAL

## 2023-07-25 DIAGNOSIS — E78.41 ELEVATED LP(A): ICD-10-CM

## 2023-07-25 DIAGNOSIS — E78.5 DYSLIPIDEMIA: ICD-10-CM

## 2023-07-25 DIAGNOSIS — R93.1 ELEVATED CORONARY ARTERY CALCIUM SCORE: ICD-10-CM

## 2023-07-25 LAB
CHOLEST SERPL-MCNC: 150 MG/DL
HDLC SERPL-MCNC: 85 MG/DL
LDLC SERPL CALC-MCNC: 52 MG/DL (ref 0–100)
TRIGL SERPL-MCNC: 66 MG/DL

## 2023-07-25 PROCEDURE — 36415 COLL VENOUS BLD VENIPUNCTURE: CPT

## 2023-07-25 PROCEDURE — 80061 LIPID PANEL: CPT

## 2023-08-02 ENCOUNTER — OFFICE VISIT (OUTPATIENT)
Dept: CARDIOLOGY CLINIC | Facility: CLINIC | Age: 53
End: 2023-08-02
Payer: COMMERCIAL

## 2023-08-02 VITALS
DIASTOLIC BLOOD PRESSURE: 64 MMHG | BODY MASS INDEX: 24.07 KG/M2 | HEART RATE: 96 BPM | SYSTOLIC BLOOD PRESSURE: 108 MMHG | HEIGHT: 64 IN | WEIGHT: 141 LBS | OXYGEN SATURATION: 97 %

## 2023-08-02 DIAGNOSIS — R93.1 ELEVATED CORONARY ARTERY CALCIUM SCORE: Primary | ICD-10-CM

## 2023-08-02 DIAGNOSIS — E78.41 ELEVATED LP(A): ICD-10-CM

## 2023-08-02 PROCEDURE — 99214 OFFICE O/P EST MOD 30 MIN: CPT | Performed by: INTERNAL MEDICINE

## 2023-08-02 RX ORDER — BIOTIN 10 MG
TABLET ORAL DAILY
COMMUNITY

## 2023-08-02 NOTE — PROGRESS NOTES
Cardiology Follow Up    [facility] Michael  1970  [id number]  [facility] [place], [place] [facility] JUAN MANUEL [facility] [place]  [phone]    1. Elevated coronary artery calcium score        2. Elevated Lp(a)            Diagnoses and all orders for this visit:    Elevated coronary artery calcium score    Elevated Lp(a)    Other orders  -     Biotin 10 MG TABS; Take by mouth daily      I had the pleasure of seeing Lucy Rodriguez for a follow up visit. INTERVAL HISTORY: none    History of the presenting illness, Discussion/Summary and My Plan are as follows:::    [facility] is a pleasant 79-year-old lady without a history of hypertension, diabetes, dyslipidemia. She has only about a 6 pack year smoking history-quit at the age of 29. Alcohol is about 2 glasses of wine a day on average. She is on a completely plant based diet and a diet history was also obtained and is quite healthy.     She does not have a family history of premature vascular disease in first-degree relatives-mom is 78 with rheumatoid arthritis and hypothyroidism without vascular disease, father is 80 and quite healthy despite having hypertension and hyperlipidemia. Her sister at 48 is on propranolol for palpitations, also has hyperlipidemia.     She mainly follows for an elevated LP(a) at 80, done because she was aware of it and requested it be checked. Her calcium score was only 6 (all in the LAD) but still putting her in the 88th percentile and then initiated her on a statin - June 2021.     She is completely asymptomatic from a cardiac standpoint, was quite active, the [place] up to 60 minutes a day, had hand surgery since then and doing less but no symptoms.      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, runs a non profit, has had ordinances passed recently promoting adoption of pets and remains very active without any cardiac symptoms,      Prior to therapy, Lipid profile was unremarkable with total cholesterol 163, triglycerides 68, HDL 68, LDL 81 and non HDL 95.-March 2021    Statin history is as follows:    Tried Rosuvastatin 2.5 mg in June 2021 and tolerated it well,  Increased to 5 mg daily in Dec 2021  Had side effects-Myalgias on rosuvastatin 5 mg  After a statin holiday, decreased it to rosuvastatin 2.5 mg daily-similar side effects    Attempted pravastatin 10 mg--started in May 2022 - numbers did not improve, increased paradoxically, subsequently stopped her TDS-HRT as well and still no lower than baseline    Atorvastatin 10 mg every other day without a discernible change in lipid profile and had hand arthralgias. Then started on Zetia 10 mg-April 2023-now with an excellent reduction     Plan:    Elevated lipoprotein (a): At this point, there is no consensus as to whether this is a risk factor or a risk marker. In general in patients with vascular disease and elevated LP(a), goal is to lower LDL to the maximum tolerated level. In her case, LDL and non-HDL are within normal limits. Underwent a stress test- June 2021 that showed excellent functional capacity without ischemia.   Calcium score while only at 6, already put her in the 88% diet and hence started her on a statin with which her LDL and non-HDL have only modetly decreased    Continue Ezetemibe 10 mg daily - no further changes  Goal would be an LDL level around 40-50 mg/dL  Prior to therapy total cholesterol was around 165  and LDL around 72     She will continue to lead her very healthy lifestyle including activity and a plant based diet.      Follow-up in 12 months     Latest Reference Range & Units 07/25/23 11:11  Zetia 10 mg   Cholesterol See Comment mg/dL 150   Triglycerides See Comment mg/dL 66   HDL >=50 mg/dL 85   LDL Calculated 0 - 100 mg/dL 52        Latest Reference Range & Units 12/02/21 08:46  Rosuva ? 09/14/22 09:08  Prava 10 12/07/22 10:04  Prava 10   Cholesterol See Comment mg/dL 165 199 187   Triglycerides See Comment mg/dL 120 56 85   HDL >=50 mg/dL 69 86 82   LDL Calculated 0 - 100 mg/dL 72 102 (H) 88   (H): Data is abnormally high    Results for Vicente Sanchez (MRN [id number]) as of 2021 17:09   Ref. Range 2021 08:46   Cholesterol Latest Ref Range: See Comment mg/dL 165   Triglycerides Latest Ref Range: See Comment mg/dL 120   HDL Latest Ref Range: >=50 mg/dL 69   LDL Calculated Latest Ref Range: 0 - 100 mg/dL 72       Patient Active Problem List   Diagnosis   • Elevated Lp(a)   • Elevated coronary artery calcium score   • Hearing impaired     Past Medical History:   Diagnosis Date   • Cancer ([address])     BCC   • Depression    • Genital warts    • Hearing impaired    • Herpes      Social History     Socioeconomic History   • Marital status: /Civil Union     Spouse name: Not on file   • Number of children: Not on file   • Years of education: Not on file   • Highest education level: Not on file   Occupational History   • Not on file   Tobacco Use   • Smoking status: Former     Packs/day: 0.50     Years: 12.00     Total pack years: 6.00     Types: Cigarettes     Start date: 1986     Quit date: 1998     Years since quittin.6   • Smokeless tobacco: Never   Vaping Use   • Vaping Use: Never used   Substance and Sexual Activity   • Alcohol use:  Yes     Alcohol/week: 4.0 standard drinks of alcohol     Types: 2 Glasses of wine, 2 Cans of beer per week     Comment: social    • Drug use: Not Currently     Types: Marijuana   • Sexual activity: Yes     Partners: Male     Birth control/protection: Male Sterilization     Comment: Vasectomy   Other Topics Concern   • Not on file   Social History Narrative   • Not on file     Social Determinants of Health     Financial Resource Strain: Not on file   Food Insecurity: Not on file   Transportation Needs: Not on file   Physical Activity: Not on file   Stress: Not on file   Social Connections: Not on file Intimate Partner Violence: Not on file   Housing Stability: Not on file      Family History   Problem Relation Age of Onset   • Heart disease Mother    • Deep vein thrombosis Mother    • Hypertension Father    • Hyperlipidemia Father    • Heart disease Father    • Heart failure Maternal Grandmother    • Heart attack Maternal Grandmother         age 79 CABG   • Heart disease Maternal Grandmother         age 66    • Heart attack Paternal Grandfather         age 40    • Heart failure Cousin    • Heart attack Maternal Grandfather         age 77    • Heart disease Maternal Grandfather    • Hyperlipidemia Sister      Past Surgical History:   Procedure Laterality Date   • CONDYLOMA EXCISION/FULGURATION     • JOINT REPLACEMENT Right     knee       Current Outpatient Medications:   •  acetaminophen (TYLENOL) 500 mg tablet, Take 1,000 mg by mouth every 6 (six) hours as needed, Disp: , Rfl:   •  acyclovir (ZOVIRAX) 400 MG tablet, Take 400 mg by mouth daily , Disp: , Rfl:   •  amoxicillin (AMOXIL) 500 mg capsule, if needed, Disp: , Rfl:   •  Biotin 10 MG TABS, Take by mouth daily, Disp: , Rfl:   •  ezetimibe (ZETIA) 10 mg tablet, Take 1 tablet (10 mg total) by mouth daily, Disp: 90 tablet, Rfl: 3  •  FLUoxetine (PROzac) 20 mg capsule, Take 20 mg by mouth daily, Disp: , Rfl:   •  buPROPion (WELLBUTRIN XL) 300 mg 24 hr tablet, Take 300 mg by mouth every morning (Patient not taking: Reported on 2023), Disp: , Rfl:   •  Cholecalciferol (Vitamin D) 125 MCG (5000 UT) CAPS, Take 1 capsule by mouth daily, Disp: , Rfl:   Allergies   Allergen Reactions   • Gabapentin Anxiety and Delirium   • Midazolam Nausea Only and Vomiting   • Nickel Dermatitis and Itching   • Rosuvastatin Myalgia   • Titanium Dermatitis, Itching and Other (See Comments)       Imaging: No results found. Review of Systems:  Review of Systems   Constitutional: Negative. HENT: Negative. Eyes: Negative. Respiratory: Negative. Cardiovascular: Negative. Endocrine: Negative. Musculoskeletal: Negative. Neurological: Negative. Physical Exam:  /64 (BP Location: Right arm, Patient Position: Sitting, Cuff Size: Standard)   Pulse 96   Ht 5' 4" (1.626 m)   Wt 64 kg (141 lb)   SpO2 97%   BMI 24.20 kg/m²   Physical Exam  Constitutional:       General: She is not in acute distress. Appearance: Normal appearance. She is not ill-appearing. HENT:      Head: Normocephalic. Nose: Nose normal. No congestion or rhinorrhea. Mouth/Throat:      Mouth: Mucous membranes are moist.      Pharynx: Oropharynx is clear. No oropharyngeal exudate or posterior oropharyngeal erythema. Eyes:      General:         Right eye: No discharge. Left eye: No discharge. Pupils: Pupils are equal, round, and reactive to light. Cardiovascular:      Rate and Rhythm: Normal rate and regular rhythm. Pulses: Normal pulses. Heart sounds: No murmur heard. No friction rub. No gallop. Pulmonary:      Effort: Pulmonary effort is normal. No respiratory distress. Breath sounds: No stridor. No wheezing or rhonchi. Musculoskeletal:         General: No swelling or tenderness. Normal range of motion. Cervical back: Normal range of motion. No rigidity or tenderness. Skin:     General: Skin is warm. Coloration: Skin is not jaundiced or pale. Findings: No bruising or erythema. Neurological:      Mental Status: She is alert. This note was completed in part utilizing [employer] direct voice recognition software. Grammatical errors, random word insertion, spelling mistakes, occasional wrong word or "sound-alike" substitutions and incomplete sentences may be an occasional consequence of the system secondary to software limitations, ambient noise and hardware issues. At the time of dictation, efforts were made to edit, clarify and /or correct errors.   Please read the chart carefully and recognize, using context, where substitutions have occurred. If you have any questions or concerns about the context, text or information contained within the body of this dictation, please contact myself, the provider, for further clarification.

## 2023-12-17 DIAGNOSIS — Z00.6 ENCOUNTER FOR EXAMINATION FOR NORMAL COMPARISON OR CONTROL IN CLINICAL RESEARCH PROGRAM: ICD-10-CM

## 2023-12-22 ENCOUNTER — APPOINTMENT (OUTPATIENT)
Dept: LAB | Facility: AMBULARY SURGERY CENTER | Age: 53
End: 2023-12-22

## 2023-12-22 DIAGNOSIS — Z00.6 ENCOUNTER FOR EXAMINATION FOR NORMAL COMPARISON OR CONTROL IN CLINICAL RESEARCH PROGRAM: ICD-10-CM

## 2023-12-22 PROCEDURE — 36415 COLL VENOUS BLD VENIPUNCTURE: CPT

## 2024-01-25 LAB
APOB+LDLR+PCSK9 GENE MUT ANL BLD/T: NOT DETECTED
BRCA1+BRCA2 DEL+DUP + FULL MUT ANL BLD/T: NOT DETECTED
MLH1+MSH2+MSH6+PMS2 GN DEL+DUP+FUL M: NOT DETECTED

## 2024-03-18 ENCOUNTER — TELEPHONE (OUTPATIENT)
Dept: INTERNAL MEDICINE CLINIC | Facility: CLINIC | Age: 54
End: 2024-03-18

## 2024-03-18 NOTE — TELEPHONE ENCOUNTER
Regarding: PCP   Contact: [phone]  ----- Message from Sherita Armenta LPN sent at 3/18/2024  7:51 AM EDT -----       ----- Message from Lucy Rodriguez to Sierra Kunz MD sent at 3/17/2024  8:23 PM -----   Hi Dr. Kunz    Are you able to be my PCP still working at this office now? If so, i would like to make an appointment with you. Thank you. Lucy

## 2024-03-18 NOTE — TELEPHONE ENCOUNTER
Left vm explaining situation and provided her our number if she'd like to callback and schedule here

## 2024-03-21 ENCOUNTER — OFFICE VISIT (OUTPATIENT)
Dept: INTERNAL MEDICINE CLINIC | Facility: CLINIC | Age: 54
End: 2024-03-21

## 2024-03-21 VITALS
HEIGHT: 64 IN | DIASTOLIC BLOOD PRESSURE: 73 MMHG | WEIGHT: 150 LBS | HEART RATE: 67 BPM | TEMPERATURE: 98.2 F | SYSTOLIC BLOOD PRESSURE: 108 MMHG | BODY MASS INDEX: 25.61 KG/M2

## 2024-03-21 DIAGNOSIS — R41.3 MEMORY LOSS: ICD-10-CM

## 2024-03-21 DIAGNOSIS — N95.1 PERIMENOPAUSAL: ICD-10-CM

## 2024-03-21 DIAGNOSIS — F32.A DEPRESSIVE DISORDER: Primary | ICD-10-CM

## 2024-03-21 DIAGNOSIS — G47.9 SLEEP DISTURBANCE: ICD-10-CM

## 2024-03-21 DIAGNOSIS — R53.83 FATIGUE, UNSPECIFIED TYPE: ICD-10-CM

## 2024-03-21 PROBLEM — Z78.9 OTHER SPECIFIED HEALTH STATUS: Chronic | Status: ACTIVE | Noted: 2017-05-25

## 2024-03-21 PROBLEM — T84.9XXA COMPLICATION OF INTERNAL RIGHT KNEE PROSTHESIS (HCC): Status: ACTIVE | Noted: 2021-12-08

## 2024-03-21 PROBLEM — M23.205 DEGENERATIVE TEAR OF MEDIAL MENISCUS: Status: ACTIVE | Noted: 2017-04-19

## 2024-03-21 PROBLEM — N39.3 SUI (STRESS URINARY INCONTINENCE, FEMALE): Status: ACTIVE | Noted: 2017-04-07

## 2024-03-21 PROBLEM — E55.9 VITAMIN D DEFICIENCY: Status: ACTIVE | Noted: 2020-01-31

## 2024-03-21 PROBLEM — M85.859 OSTEOPENIA OF HIP: Status: ACTIVE | Noted: 2020-01-31

## 2024-03-21 PROBLEM — Z96.659: Status: ACTIVE | Noted: 2020-08-09

## 2024-03-21 PROBLEM — N32.81 OAB (OVERACTIVE BLADDER): Status: ACTIVE | Noted: 2017-04-07

## 2024-03-21 PROBLEM — Z96.651 HISTORY OF TOTAL RIGHT KNEE REPLACEMENT: Status: ACTIVE | Noted: 2017-06-14

## 2024-03-21 PROBLEM — H90.3 SENSORINEURAL HEARING LOSS (SNHL) OF BOTH EARS: Status: ACTIVE | Noted: 2020-07-23

## 2024-03-21 PROBLEM — H33.312: Status: ACTIVE | Noted: 2020-05-21

## 2024-03-21 PROBLEM — Z96.651 COMPLICATION OF INTERNAL RIGHT KNEE PROSTHESIS (HCC): Status: ACTIVE | Noted: 2021-12-08

## 2024-03-21 PROBLEM — E78.49 OTHER HYPERLIPIDEMIA: Status: ACTIVE | Noted: 2023-09-18

## 2024-03-21 PROBLEM — Z98.890 H/O ARTHROSCOPY OF RIGHT KNEE: Status: ACTIVE | Noted: 2017-04-19

## 2024-03-21 PROCEDURE — 99204 OFFICE O/P NEW MOD 45 MIN: CPT | Performed by: FAMILY MEDICINE

## 2024-03-21 RX ORDER — BUPROPION HYDROCHLORIDE 150 MG/1
150 TABLET ORAL EVERY MORNING
Qty: 30 TABLET | Refills: 5 | Status: SHIPPED | OUTPATIENT
Start: 2024-03-21 | End: 2024-09-17

## 2024-03-21 NOTE — PROGRESS NOTES
Name: Lucy Rodriguez      : 1970      MRN: [id number]  Encounter Provider: Sierra Knuz MD  Encounter Date: 3/21/2024   Encounter department: [facility]    Assessment & Plan     1. Depressive disorder  Assessment & Plan:  See detailed HPI , pt has had depression with for some time , she has taken multi SSRI's over the last ~ 5 years .She had some relief of sx for a time and then would wean herself off of med and eventually sx returned . She has been to counseling in the past with varied results . She is actively exercising and I have encouraged her to continue this regimen , she is very good with hydration and healthy diet. She is currently taking prozac 20 mg and tolerates this well , rec continue same add wellbutrin xr 150 mg 1 q day , she will check w insurance co regarding participating counselors , f/u here 4 weeks sooner if needed     Orders:  -     buPROPion (WELLBUTRIN XL) 150 mg 24 hr tablet; Take 1 tablet (150 mg total) by mouth every morning    2. Perimenopausal  Assessment & Plan:  See HPI , no menses x 6 months labs have been nl , reviewed healthy diet , hydration exercise update labs including testosterone , f/u w gyn     Orders:  -     Testosterone; Future    3. Memory loss    4. Sleep disturbance  Assessment & Plan:  Long hx of same she has had home sleep study negative , labs have monika normal , likely related to depression , she has been diligent with exercise regimen , encouraged to continue this , also is very good with healthy diet , hopefully new med combination will be helpful       5. Fatigue, unspecified type  -     Comprehensive metabolic panel; Future  -     TSH, 3rd generation with Free T4 reflex; Future  -     Testosterone; Future  -     CBC and differential; Future           Subjective     HPI New pt here to establish care , prior patient of mine pt has continued to struggle with depression , low mood had OV 2023 had tapered off prozac  , her choice , called and wanted to go back on med ,she has tried wellbutrin in the past 150 mg was ok , incr to 300 mg that made her too anxious   She has not had menses x 6 months feels so miserable , has taken lexapro in the remote past didn't feel well on feels no lito , she is working out 2-5 days per week [employer] 20 min up to 60 min she feels good doing this She eats vegan diet , strict with this gets enough protein and drinks enough water   She cries easily just would like to lay in bed , brain fog can't stay focused , doesn't feel herself , tingly feelings in head and back . She states she has had thoughts of feeling she would be better off dead , from time to time but , she has no suicidal plan , no homicidal ideations   She was drinking daily vodka or wine , to help her sleep she has not had a drink last 4 days no adverse affects   Review of Systems   Constitutional:  Positive for fatigue. Negative for chills, fever and unexpected weight change.   HENT:  Negative for ear pain and sore throat.    Eyes:  Negative for pain and visual disturbance.   Respiratory:  Negative for cough and shortness of breath.    Cardiovascular:  Negative for chest pain and palpitations.   Gastrointestinal:  Negative for abdominal pain and vomiting.   Genitourinary:  Positive for dyspareunia. Negative for dysuria and hematuria.   Musculoskeletal:  Positive for arthralgias. Negative for back pain.   Skin:  Negative for color change and rash.   Neurological:  Negative for seizures and syncope.   Psychiatric/Behavioral:  Positive for decreased concentration and sleep disturbance. Negative for confusion, self-injury and suicidal ideas. The patient is nervous/anxious.         Depression    All other systems reviewed and are negative.      Past Medical History:   Diagnosis Date    Cancer (HCC) 2008    BCC    Depression 2011    Genital warts     Hearing impaired     Herpes      Past Surgical History:   Procedure Laterality Date     CONDYLOMA EXCISION/FULGURATION  1987    JOINT REPLACEMENT Right     knee     Family History   Problem Relation Age of Onset    Heart disease Mother     Deep vein thrombosis Mother     Hypertension Father     Hyperlipidemia Father     Heart disease Father     Heart failure Maternal Grandmother     Heart attack Maternal Grandmother         age 70 CABG    Heart disease Maternal Grandmother         age 78     Heart attack Paternal Grandfather         age 44     Heart failure Cousin     Heart attack Maternal Grandfather         age 66     Heart disease Maternal Grandfather     Hyperlipidemia Sister      Social History     Socioeconomic History    Marital status: /Civil Union     Spouse name: None    Number of children: None    Years of education: None    Highest education level: None   Occupational History    None   Tobacco Use    Smoking status: Former     Current packs/day: 0.00     Average packs/day: 0.5 packs/day for 12.9 years (6.5 ttl pk-yrs)     Types: Cigarettes     Start date: 1986     Quit date: 1998     Years since quittin.3    Smokeless tobacco: Never   Vaping Use    Vaping status: Never Used   Substance and Sexual Activity    Alcohol use: Yes     Alcohol/week: 4.0 standard drinks of alcohol     Types: 2 Glasses of wine, 2 Cans of beer per week     Comment: social     Drug use: Not Currently     Types: Marijuana    Sexual activity: Yes     Partners: Male     Birth control/protection: Male Sterilization     Comment: Vasectomy   Other Topics Concern    None   Social History Narrative    None     Social Determinants of Health     Financial Resource Strain: Low Risk  (3/19/2024)    Overall Financial Resource Strain (CARDIA)     Difficulty of Paying Living Expenses: Not hard at all   Food Insecurity: No Food Insecurity (3/19/2024)    Hunger Vital Sign     Worried About Running Out of Food in the Last Year: Never true     Ran Out of Food in the Last Year: Never true    Transportation Needs: No Transportation Needs (3/19/2024)    PRAPARE - Transportation     Lack of Transportation (Medical): No     Lack of Transportation (Non-Medical): No   Physical Activity: Insufficiently Active (9/15/2023)    Received from [facility]    Exercise Vital Sign     Days of Exercise per Week: 4 days     Minutes of Exercise per Session: 20 min   Stress: Stress Concern Present (9/15/2023)    Received from [facility]    [employer] [place] [employer] - Occupational Stress Questionnaire     Feeling of Stress : To some extent   Social Connections: Moderately Integrated (9/15/2023)    Received from [facility]    Social Connection and Isolation Panel [NHANES]     Frequency of Communication with Friends and Family: More than three times a week     Frequency of Social Gatherings with Friends and Family: Twice a week     Attends Hoahaoism Services: Never     Active Member of Clubs or Organizations: Yes     Attends Club or Organization Meetings: More than 4 times per year     Marital Status:    Intimate Partner Violence: Not At Risk (9/15/2023)    Received from [facility]    Humiliation, Afraid, Rape, and Kick questionnaire     Fear of Current or Ex-Partner: No     Emotionally Abused: No     Physically Abused: No     Sexually Abused: No   Housing Stability: Low Risk  (3/19/2024)    Housing Stability Vital Sign     Unable to Pay for Housing in the Last Year: No     Number of Places Lived in the Last Year: 1     Unstable Housing in the Last Year: No     Current Outpatient Medications on File Prior to Visit   Medication Sig    acetaminophen (TYLENOL) 500 mg tablet Take 1,000 mg by mouth every 6 (six) hours as needed    acyclovir (ZOVIRAX) 400 MG tablet Take 400 mg by mouth daily     amoxicillin (AMOXIL) 500 mg capsule if needed    Biotin 10 MG TABS Take by mouth daily    ezetimibe (ZETIA) 10 mg tablet Take 1 tablet (10 mg  "total) by mouth daily     Allergies   Allergen Reactions    Gabapentin Anxiety and Delirium    Midazolam Nausea Only and Vomiting    Nickel Dermatitis and Itching    Rosuvastatin Myalgia    Titanium Dermatitis, Itching and Other (See Comments)     Immunization History   Administered Date(s) Administered    COVID-19 PFIZER VACCINE 0.3 ML IM 12/21/2020, 01/10/2021, 12/09/2021       Objective     /73 (BP Location: Left arm, Patient Position: Sitting, Cuff Size: Adult)   Pulse 67   Temp 98.2 °F (36.8 °C) (Temporal)   Ht 5' 4\" (1.626 m)   Wt 68 kg (150 lb)   BMI 25.75 kg/m²     Physical Exam  Constitutional:       Comments: Skin with good color turgor , well hydrated ,no distress noted     HENT:      Head: Normocephalic.      Right Ear: Decreased hearing noted.      Left Ear: Decreased hearing noted.      Ears:      Comments: Bilat hearing aids   Neck:      Thyroid: No thyromegaly.   Cardiovascular:      Rate and Rhythm: Normal rate and regular rhythm.      Heart sounds: Normal heart sounds.   Pulmonary:      Breath sounds: Normal breath sounds.   Musculoskeletal:      Thoracic back: Normal range of motion.      Lumbar back: Normal range of motion.      Right knee: Deformity present. Decreased range of motion.      Left knee: Deformity present. Decreased range of motion.      Comments: Hx knee surgery revision R  knee replacement    Lymphadenopathy:      Cervical: No cervical adenopathy.      Right cervical: No superficial cervical adenopathy.     Left cervical: No superficial cervical adenopathy.   Skin:     General: Skin is warm and dry.   Neurological:      Comments: Non focal exam    Psychiatric:         Attention and Perception: Attention normal.         Mood and Affect: Mood is depressed. Mood is not anxious. Affect is not angry, tearful or inappropriate.         Speech: Speech normal.         Behavior: Behavior normal.         Thought Content: Thought content normal.       Sierra Kunz MD    "

## 2024-03-22 NOTE — ASSESSMENT & PLAN NOTE
See HPI , no menses x 6 months labs have been nl , reviewed healthy diet , hydration exercise update labs including testosterone , f/u w gyn    [Change in Activity] : change in activity [No Acute Changes] : No acute changes since previous visit [Fever Above 102] : no fever [Malaise] : no malaise [Rash] : no rash [Itching] : no itching [Eye Pain] : no eye pain [Redness] : no redness [Wheezing] : no wheezing [Cough] : no cough [Asthma] : no asthma [Vomiting] : no vomiting [Diarrhea] : no diarrhea [Constipation] : no constipation [Limping] : no limping [Joint Swelling] : no joint swelling [Diabetes] : no diabetese [Bruising] : no tendency for easy bruising [Swollen Glands] : no lymphadenopathy [Frequent Infections] : no frequent infections [Nl] : ENT

## 2024-03-22 NOTE — ASSESSMENT & PLAN NOTE
Long hx of same she has had home sleep study negative , labs have monika normal , likely related to depression , she has been diligent with exercise regimen , encouraged to continue this , also is very good with healthy diet , hopefully new med combination will be helpful

## 2024-03-22 NOTE — ASSESSMENT & PLAN NOTE
See detailed HPI , pt has had depression with for some time , she has taken multi SSRI's over the last ~ 5 years .She had some relief of sx for a time and then would wean herself off of med and eventually sx returned . She has been to counseling in the past with varied results . She is actively exercising and I have encouraged her to continue this regimen , she is very good with hydration and healthy diet. She is currently taking prozac 20 mg and tolerates this well , rec continue same add wellbutrin xr 150 mg 1 q day , she will check w insurance co regarding participating counselors , f/u here 4 weeks sooner if needed

## 2024-03-25 DIAGNOSIS — E78.41 ELEVATED LP(A): Primary | ICD-10-CM

## 2024-03-25 DIAGNOSIS — E78.5 DYSLIPIDEMIA: ICD-10-CM

## 2024-03-26 ENCOUNTER — APPOINTMENT (OUTPATIENT)
Dept: LAB | Facility: CLINIC | Age: 54
End: 2024-03-26
Payer: COMMERCIAL

## 2024-03-26 DIAGNOSIS — R93.1 ELEVATED CORONARY ARTERY CALCIUM SCORE: ICD-10-CM

## 2024-03-26 DIAGNOSIS — R53.83 FATIGUE, UNSPECIFIED TYPE: ICD-10-CM

## 2024-03-26 DIAGNOSIS — E78.5 DYSLIPIDEMIA: ICD-10-CM

## 2024-03-26 DIAGNOSIS — N95.1 PERIMENOPAUSAL: ICD-10-CM

## 2024-03-26 DIAGNOSIS — E78.41 ELEVATED LP(A): ICD-10-CM

## 2024-03-26 LAB
ALBUMIN SERPL BCP-MCNC: 4 G/DL (ref 3.5–5)
ALP SERPL-CCNC: 63 U/L (ref 34–104)
ALT SERPL W P-5'-P-CCNC: 14 U/L (ref 7–52)
ANION GAP SERPL CALCULATED.3IONS-SCNC: 10 MMOL/L (ref 4–13)
AST SERPL W P-5'-P-CCNC: 21 U/L (ref 13–39)
BASOPHILS # BLD AUTO: 0.03 THOUSANDS/ÂΜL (ref 0–0.1)
BASOPHILS NFR BLD AUTO: 1 % (ref 0–1)
BILIRUB SERPL-MCNC: 0.39 MG/DL (ref 0.2–1)
BUN SERPL-MCNC: 12 MG/DL (ref 5–25)
CALCIUM SERPL-MCNC: 8.7 MG/DL (ref 8.4–10.2)
CHLORIDE SERPL-SCNC: 102 MMOL/L (ref 96–108)
CHOLEST SERPL-MCNC: 164 MG/DL
CO2 SERPL-SCNC: 26 MMOL/L (ref 21–32)
CREAT SERPL-MCNC: 0.77 MG/DL (ref 0.6–1.3)
EOSINOPHIL # BLD AUTO: 0.05 THOUSAND/ÂΜL (ref 0–0.61)
EOSINOPHIL NFR BLD AUTO: 1 % (ref 0–6)
ERYTHROCYTE [DISTWIDTH] IN BLOOD BY AUTOMATED COUNT: 12.6 % (ref 11.6–15.1)
GFR SERPL CREATININE-BSD FRML MDRD: 88 ML/MIN/1.73SQ M
GLUCOSE P FAST SERPL-MCNC: 78 MG/DL (ref 65–99)
HCT VFR BLD AUTO: 38.8 % (ref 34.8–46.1)
HDLC SERPL-MCNC: 73 MG/DL
HGB BLD-MCNC: 12.3 G/DL (ref 11.5–15.4)
IMM GRANULOCYTES # BLD AUTO: 0.01 THOUSAND/UL (ref 0–0.2)
IMM GRANULOCYTES NFR BLD AUTO: 0 % (ref 0–2)
LDLC SERPL CALC-MCNC: 81 MG/DL (ref 0–100)
LYMPHOCYTES # BLD AUTO: 1.14 THOUSANDS/ÂΜL (ref 0.6–4.47)
LYMPHOCYTES NFR BLD AUTO: 30 % (ref 14–44)
MCH RBC QN AUTO: 29.6 PG (ref 26.8–34.3)
MCHC RBC AUTO-ENTMCNC: 31.7 G/DL (ref 31.4–37.4)
MCV RBC AUTO: 93 FL (ref 82–98)
MONOCYTES # BLD AUTO: 0.43 THOUSAND/ÂΜL (ref 0.17–1.22)
MONOCYTES NFR BLD AUTO: 11 % (ref 4–12)
NEUTROPHILS # BLD AUTO: 2.16 THOUSANDS/ÂΜL (ref 1.85–7.62)
NEUTS SEG NFR BLD AUTO: 57 % (ref 43–75)
NRBC BLD AUTO-RTO: 0 /100 WBCS
PLATELET # BLD AUTO: 229 THOUSANDS/UL (ref 149–390)
PMV BLD AUTO: 11.6 FL (ref 8.9–12.7)
POTASSIUM SERPL-SCNC: 4.2 MMOL/L (ref 3.5–5.3)
PROT SERPL-MCNC: 6.3 G/DL (ref 6.4–8.4)
RBC # BLD AUTO: 4.16 MILLION/UL (ref 3.81–5.12)
SODIUM SERPL-SCNC: 138 MMOL/L (ref 135–147)
TESTOST SERPL-MSCNC: <10 NG/DL
TRIGL SERPL-MCNC: 52 MG/DL
TSH SERPL DL<=0.05 MIU/L-ACNC: 2.46 UIU/ML (ref 0.45–4.5)
WBC # BLD AUTO: 3.82 THOUSAND/UL (ref 4.31–10.16)

## 2024-03-26 PROCEDURE — 84443 ASSAY THYROID STIM HORMONE: CPT

## 2024-03-26 PROCEDURE — 84403 ASSAY OF TOTAL TESTOSTERONE: CPT

## 2024-03-26 PROCEDURE — 80061 LIPID PANEL: CPT

## 2024-03-26 PROCEDURE — 85025 COMPLETE CBC W/AUTO DIFF WBC: CPT

## 2024-03-26 PROCEDURE — 36415 COLL VENOUS BLD VENIPUNCTURE: CPT

## 2024-03-26 PROCEDURE — 80053 COMPREHEN METABOLIC PANEL: CPT

## 2024-03-27 ENCOUNTER — TELEPHONE (OUTPATIENT)
Age: 54
End: 2024-03-27

## 2024-03-27 NOTE — TELEPHONE ENCOUNTER
First Name: Lucy  Last Name: Michael  YOB: 1970  Email: [email or web address]  Phone: [phone]   Address: [address]  City: [place]  State: [place]  Zip: [address]  Comments: I am would like a recommendation for a therapist I can talk with. I am set up with yearly gym appointment at Dr. Blum's office with a PA-C. thank you    Spoke with pt, she is looking to discuss sxs and treatment options, Scheduled for 4/8/24

## 2024-04-03 DIAGNOSIS — E78.41 ELEVATED LP(A): ICD-10-CM

## 2024-04-03 DIAGNOSIS — E78.5 DYSLIPIDEMIA: Primary | ICD-10-CM

## 2024-04-08 LAB — MISCELLANEOUS LAB TEST RESULT: NORMAL

## 2024-04-11 ENCOUNTER — DOCUMENTATION (OUTPATIENT)
Dept: CARDIOLOGY CLINIC | Facility: HOSPITAL | Age: 54
End: 2024-04-11

## 2024-04-11 NOTE — PROGRESS NOTES
Reviewed results of cardio IQ panel    Surprisingly LDL has increased to the 70s, previously in the 50s, she has remained on ezetimibe 10 mg daily with good compliance and stable diet and weight.    Previously LPA was elevated at 89 mg per deciliter in 2021, now 239 nmol/L  Nanomoles per liter is the preferred measurement unit    No changes at this time, we will simply recheck regular cholesterol panel in 6 months  She remains asymptomatic at good levels of physical exertion    Family screening was advised for lipoprotein a  Due to family history of stroke and low-volume carotids, check carotid Dopplers as well on a routine basis    Follow-up with me around August 2024    Discussed with her on the phone

## 2024-04-15 ENCOUNTER — OFFICE VISIT (OUTPATIENT)
Dept: GYNECOLOGY | Facility: CLINIC | Age: 54
End: 2024-04-15
Payer: COMMERCIAL

## 2024-04-15 VITALS
BODY MASS INDEX: 25.61 KG/M2 | DIASTOLIC BLOOD PRESSURE: 60 MMHG | WEIGHT: 150 LBS | SYSTOLIC BLOOD PRESSURE: 100 MMHG | HEIGHT: 64 IN

## 2024-04-15 DIAGNOSIS — F32.89 OTHER DEPRESSION: ICD-10-CM

## 2024-04-15 DIAGNOSIS — N95.2 VAGINAL ATROPHY: ICD-10-CM

## 2024-04-15 DIAGNOSIS — N95.1 PERIMENOPAUSAL: Primary | ICD-10-CM

## 2024-04-15 DIAGNOSIS — R39.9 UTI SYMPTOMS: ICD-10-CM

## 2024-04-15 LAB
BILIRUB UR QL STRIP: NEGATIVE
CLARITY UR: CLEAR
COLOR UR: NORMAL
GLUCOSE UR STRIP-MCNC: NEGATIVE MG/DL
HGB UR QL STRIP.AUTO: NEGATIVE
KETONES UR STRIP-MCNC: NEGATIVE MG/DL
LEUKOCYTE ESTERASE UR QL STRIP: NEGATIVE
NITRITE UR QL STRIP: NEGATIVE
PH UR STRIP.AUTO: 7.5 [PH]
PROT UR STRIP-MCNC: NEGATIVE MG/DL
SP GR UR STRIP.AUTO: 1.02 (ref 1–1.03)
UROBILINOGEN UR STRIP-ACNC: <2 MG/DL

## 2024-04-15 PROCEDURE — 81003 URINALYSIS AUTO W/O SCOPE: CPT | Performed by: OBSTETRICS & GYNECOLOGY

## 2024-04-15 PROCEDURE — 99214 OFFICE O/P EST MOD 30 MIN: CPT | Performed by: OBSTETRICS & GYNECOLOGY

## 2024-04-15 RX ORDER — FLUOXETINE HYDROCHLORIDE 40 MG/1
CAPSULE ORAL
COMMUNITY
Start: 2024-03-21 | End: 2024-04-15 | Stop reason: SDUPTHER

## 2024-04-15 RX ORDER — FLUOXETINE HYDROCHLORIDE 40 MG/1
40 CAPSULE ORAL DAILY
Qty: 20 CAPSULE | Refills: 0 | Status: SHIPPED | OUTPATIENT
Start: 2024-04-15 | End: 2024-04-15 | Stop reason: SDUPTHER

## 2024-04-15 RX ORDER — ESTRADIOL 0.1 MG/G
0.5 CREAM VAGINAL 2 TIMES WEEKLY
Qty: 42.5 G | Refills: 1 | Status: SHIPPED | OUTPATIENT
Start: 2024-04-18

## 2024-04-15 RX ORDER — FLUOXETINE HYDROCHLORIDE 40 MG/1
40 CAPSULE ORAL DAILY
Qty: 90 CAPSULE | Refills: 1 | Status: SHIPPED | OUTPATIENT
Start: 2024-04-15

## 2024-04-15 NOTE — PROGRESS NOTES
Assessment/Plan:    Menopause/perimenopause discussed at length. Given that pt has only gone 7 months without menses, will check FSH/LH/estradiol levels. Limitations of blood work reviewed. If pt is in fact PM, discussed HT and pt would like to start systemic hormone therapy to determine if sx improve.   Will start vaginal estrogen 1/2 gm every night for 2 weeks and then twice weekly as reviewed. Risks/benefits/instructions for use reviewed. Pt to have blood work drawn before starting loading dose.   Urine sample sent. Pt given info on bladder irritants and advised to do some research on trospium for bladder sx. Discussed PBS vs UTI. Discussed oral meds, bladder instillations for treatment. Pt is reluctant to start oral meds or BI at this time, but will do some research on her own. List of bladder irritants given and reviewed.  Prozac reordered until pt sees PCP in 3 days.   Will notify pt of results. She will also be returning to office in 1 month for annual exam at which time exam will be done to better assess dyspareunia etiology.       I have spent a total time of 39 minutes on 04/15/24 in caring for this patient including Prognosis, Risks and benefits of tx options, Instructions for management, Patient and family education, Importance of tx compliance, Risk factor reductions, Impressions, Counseling / Coordination of care, Documenting in the medical record, Reviewing / ordering tests, medicine, procedures  , and Obtaining or reviewing history  .        Diagnoses and all orders for this visit:    Perimenopausal  -     Follicle stimulating hormone; Future  -     Luteinizing hormone; Future  -     Estradiol; Future    Vaginal atrophy  -     estradiol (ESTRACE) 0.1 mg/g vaginal cream; Insert 0.5 g into the vagina 2 (two) times a week Do not start before April 18, 2024.    Other depression  -     FLUoxetine (PROzac) 40 MG capsule; Take 1 capsule (40 mg total) by mouth daily    UTI symptoms  -     UA w Reflex to  Microscopic w Reflex to Culture    Other orders  -     Discontinue: FLUoxetine (PROzac) 40 MG capsule        Subjective:      Patient ID: Lucy Rodriguez is a 53 y.o. female.    New pt scheduled for menopause consult.   However, upon discussion, pt continued getting menses until 7 months ago. Has now gone 7 months without a period.   Menopause vs perimenopause reviewed.   Most bothersome sx of perimenopause if vaginal dryness/dyspareunia and lack of desire. She and her  have a good relationship.   Pt is also bothered by feelings of depression. She follows with PCP. She is on Prozac. Most recently she was tried on Prozac and Wellbutrin and pt had SE when adding Wellbutrin. She was then started on Prozac 40 mg. She ran out and has not been able to reach her PCP for refill and she is out. She does not feel the increase has made a significant improvement and pt is wondering if menopause/hormonal fluctuations are contributing to sx. She denies SI/HI.   Pt also reports decreased concentration and memory concerns.   She denies hot flashes and night sweats.   Pt is also experiencing ongoing urinary concerns. She reports she was a pt of Dr. Wells at [facility] urogyn, was diagnosed with pelvic floor dysfunction and remembers having valium suppositories. Today, she reports a feeling of pelvic pressure with a full bladder that feels better with emptying. She empties frequently to alleviate pain and then also has urgency with rare UUI. Occasional ALY.           The following portions of the patient's history were reviewed and updated as appropriate: allergies, current medications, past family history, past medical history, past social history, past surgical history and problem list.    Review of Systems   Constitutional: Negative.    Respiratory: Negative.     Cardiovascular: Negative.    Gastrointestinal: Negative.    Endocrine: Negative.    Genitourinary:  Positive for dyspareunia, dysuria, frequency, pelvic pain and  "urgency. Negative for vaginal bleeding, vaginal discharge and vaginal pain.   Musculoskeletal: Negative.    Skin: Negative.    Neurological: Negative.    Psychiatric/Behavioral:  Positive for decreased concentration.          Objective:      /60   Ht 5' 4\" (1.626 m)   Wt 68 kg (150 lb)   LMP 08/16/2023 (Approximate)   BMI 25.75 kg/m²          Physical Exam  Vitals and nursing note reviewed.   Constitutional:       Appearance: Normal appearance.   HENT:      Head: Normocephalic and atraumatic.   Pulmonary:      Effort: Pulmonary effort is normal.   Musculoskeletal:         General: Normal range of motion.      Cervical back: Normal range of motion.   Skin:     General: Skin is warm and dry.   Neurological:      Mental Status: She is alert and oriented to person, place, and time.   Psychiatric:         Mood and Affect: Mood normal.         Behavior: Behavior normal.         Thought Content: Thought content normal.         Judgment: Judgment normal.         "

## 2024-04-18 ENCOUNTER — APPOINTMENT (OUTPATIENT)
Dept: LAB | Facility: CLINIC | Age: 54
End: 2024-04-18
Payer: COMMERCIAL

## 2024-04-18 ENCOUNTER — PATIENT MESSAGE (OUTPATIENT)
Dept: GYNECOLOGY | Facility: CLINIC | Age: 54
End: 2024-04-18

## 2024-04-18 DIAGNOSIS — N32.81 OAB (OVERACTIVE BLADDER): Primary | ICD-10-CM

## 2024-04-18 DIAGNOSIS — N95.1 PERIMENOPAUSAL: ICD-10-CM

## 2024-04-18 LAB
ESTRADIOL SERPL-MCNC: 140.9 PG/ML
FSH SERPL-ACNC: 48.1 MIU/ML
LH SERPL-ACNC: 48.5 MIU/ML

## 2024-04-18 PROCEDURE — 36415 COLL VENOUS BLD VENIPUNCTURE: CPT

## 2024-04-18 PROCEDURE — 83001 ASSAY OF GONADOTROPIN (FSH): CPT

## 2024-04-18 PROCEDURE — 82670 ASSAY OF TOTAL ESTRADIOL: CPT

## 2024-04-18 PROCEDURE — 83002 ASSAY OF GONADOTROPIN (LH): CPT

## 2024-04-19 ENCOUNTER — TELEPHONE (OUTPATIENT)
Dept: GYNECOLOGY | Facility: CLINIC | Age: 54
End: 2024-04-19

## 2024-04-19 RX ORDER — TROSPIUM CHLORIDE ER 60 MG/1
60 CAPSULE ORAL
Qty: 90 CAPSULE | Refills: 0 | Status: SHIPPED | OUTPATIENT
Start: 2024-04-19

## 2024-04-19 NOTE — TELEPHONE ENCOUNTER
----- Message from Kacy Lopez sent at 4/19/2024  9:08 AM EDT -----  Regarding: FW: PBS  Contact: [phone]    ----- Message -----  From: Lucy Rodriguez  Sent: 4/19/2024   8:57 AM EDT  To: [facility]  Subject: PBS                                              I called back on hold 10 minutes hung up.   Give me a ring when you’re available!  Thanks       Spoke to pt, reviewed hormone levels that state she is not in menopause. Also discussed possible PBS. Discussed starting trospium with opportunity to add Uribel or bladder instillations in the future. Pt does have significant bladder irritant intake and will try to decrease those as well. Instructed to call after a month to assess sx.

## 2024-04-22 DIAGNOSIS — R79.89 ABNORMAL CBC: Primary | ICD-10-CM

## 2024-04-25 ENCOUNTER — HOSPITAL ENCOUNTER (OUTPATIENT)
Dept: VASCULAR ULTRASOUND | Facility: HOSPITAL | Age: 54
Discharge: HOME | End: 2024-04-25
Payer: COMMERCIAL

## 2024-04-25 DIAGNOSIS — E78.41 ELEVATED LP(A): ICD-10-CM

## 2024-04-25 DIAGNOSIS — E78.5 DYSLIPIDEMIA: ICD-10-CM

## 2024-04-25 DIAGNOSIS — R09.89 DECREASED CAROTID PULSE: ICD-10-CM

## 2024-04-25 PROCEDURE — 93880 EXTRACRANIAL BILAT STUDY: CPT

## 2024-04-25 PROCEDURE — 93880 EXTRACRANIAL BILAT STUDY: CPT | Performed by: SURGERY

## 2024-04-29 ENCOUNTER — TELEPHONE (OUTPATIENT)
Dept: CARDIOLOGY CLINIC | Facility: CLINIC | Age: 54
End: 2024-04-29

## 2024-04-29 NOTE — TELEPHONE ENCOUNTER
----- Message -----  From: Lorenzo Burnett MD  Sent: 4/28/2024   5:52 PM EDT  To: [facility] [place] [facility]    Results are normal. Please notify pt.

## 2024-05-07 DIAGNOSIS — F32.89 OTHER DEPRESSION: ICD-10-CM

## 2024-05-08 RX ORDER — FLUOXETINE HYDROCHLORIDE 40 MG/1
40 CAPSULE ORAL DAILY
Qty: 90 CAPSULE | Refills: 0 | Status: SHIPPED | OUTPATIENT
Start: 2024-05-08 | End: 2024-05-17 | Stop reason: DRUGHIGH

## 2024-05-09 ENCOUNTER — AMB VIDEO VISIT (OUTPATIENT)
Dept: OTHER | Facility: HOSPITAL | Age: 54
End: 2024-05-09

## 2024-05-09 VITALS — TEMPERATURE: 98.3 F | SYSTOLIC BLOOD PRESSURE: 122 MMHG | DIASTOLIC BLOOD PRESSURE: 82 MMHG

## 2024-05-09 DIAGNOSIS — J01.90 ACUTE SINUSITIS, RECURRENCE NOT SPECIFIED, UNSPECIFIED LOCATION: Primary | ICD-10-CM

## 2024-05-09 PROCEDURE — ECARE PR SL URGENT CARE VIRTUAL VISIT: Performed by: NURSE PRACTITIONER

## 2024-05-09 RX ORDER — AMOXICILLIN 875 MG/1
875 TABLET, COATED ORAL 2 TIMES DAILY
Qty: 20 TABLET | Refills: 0 | Status: SHIPPED | OUTPATIENT
Start: 2024-05-09 | End: 2024-05-19

## 2024-05-09 NOTE — PROGRESS NOTES
Required Documentation:  Encounter provider: TOMMY Elliott    Identify all parties in room with patient during virtual visit:  No one else    The patient was identified by name and date of birth. Lucy Rodriguez was informed that this is a telemedicine visit and that the visit is being conducted through the [employer] platform. She agrees to proceed..  My office door was closed. No one else was in the room.  She acknowledged consent and understanding of privacy and security of the video platform. The patient has agreed to participate and understands they can discontinue the visit at any time.    Verification of patient location:  Patient is located at Home in the following state in which I hold an active license [place]    Patient is aware this is a billable service.     Reason for visit is No chief complaint on file.       Subjective  This is a 53 year old female here today for video visit.  She states she has a cold for about 6 days.  She has had some fatigue.   She has had chills.  She is having some dental pain, congestion, runny nose and cough.   She states she does feel like she may be getting better.  She states she is having some yellow discharge and coughing up some mucous.  No chest pain or sob.  Some discomfort from coughing.  She is eating and drinking okay.             Past Medical History:   Diagnosis Date    Cancer (HCC) 2008    BCC    Depression 2011    Genital warts     Hearing impaired     Herpes        Past Surgical History:   Procedure Laterality Date    CONDYLOMA EXCISION/FULGURATION  1987    JOINT REPLACEMENT Right     knee        Allergies   Allergen Reactions    Gabapentin Anxiety and Delirium    Midazolam Nausea Only and Vomiting    Nickel Dermatitis and Itching    Rosuvastatin Myalgia    Titanium Dermatitis, Itching and Other (See Comments)       Review of Systems   Constitutional:  Positive for activity change and fatigue.   HENT:  Positive for congestion, sinus pressure and  sinus pain.    Respiratory:  Positive for cough.    Psychiatric/Behavioral: Negative.         Video Exam    Vitals:    05/09/24 1124   BP: 122/82   Temp: 98.3 °F (36.8 °C)       Physical Exam  Constitutional:       General: She is not in acute distress.     Appearance: Normal appearance. She is not ill-appearing or toxic-appearing.   HENT:      Head: Normocephalic and atraumatic.      Nose: Congestion present.   Eyes:      Conjunctiva/sclera: Conjunctivae normal.   Pulmonary:      Effort: Pulmonary effort is normal. No respiratory distress.   Neurological:      Mental Status: She is alert and oriented to person, place, and time.   Psychiatric:         Mood and Affect: Mood normal.         Behavior: Behavior normal.         Thought Content: Thought content normal.         Judgment: Judgment normal.         Visit Time  Total Visit Duration: 7 minutes    Assessment/Plan:    Diagnoses and all orders for this visit:    Acute sinusitis, recurrence not specified, unspecified location  -     amoxicillin (AMOXIL) 875 mg tablet; Take 1 tablet (875 mg total) by mouth 2 (two) times a day for 10 days        Patient Instructions   At this time I would recommend you give this another 2 days since are starting to feel better and not having fever.  As we dicussed if you develop any fevers or worsening symptoms start antibiotic.  OTC cough and cold as needed.  Nasal saline flushes can be helpful.  Follow up with PCP if no improvement.  Go to ER with any worsening symptoms.

## 2024-05-09 NOTE — PATIENT INSTRUCTIONS
At this time I would recommend you give this another 2 days since are starting to feel better and not having fever.  As we dicussed if you develop any fevers or worsening symptoms start antibiotic.  OTC cough and cold as needed.  Nasal saline flushes can be helpful.  Follow up with PCP if no improvement.  Go to ER with any worsening symptoms.

## 2024-05-09 NOTE — CARE ANYWHERE EVISITS
Visit Summary for Lucy Rodriguez - Gender: Female - Date of Birth: 1970  Date: [id number] - Duration: 7 minutes  Patient: Lucy Rodriguez  Provider: Bairon SANDERS    Patient Contact Information  Address  [place]; [place] [address]  [phone]    Visit Topics  Flu-Like Symptoms [Added By: Self - 2024-05-09]  Cold [Added By: Self - 2024-05-09]  Headache [Added By: Self - 2024-05-09]    Triage Questions   What is your current physical address in the event of a medical emergency? Answer []  Are you allergic to any medications? Answer []  Are you now or could you be pregnant? Answer []  Do you have any immune system compromise or chronic lung   disease? Answer []  Do you have any vulnerable family members in the home (infant, pregnant, cancer, elderly)? Answer []     Conversation Transcripts  [0A][0A] [Notification] You are connected with Bairon SANDERS, Urgent Care Specialist.[0A][Notification] Lucy Rodriguez is located in [place].[0A][Notification] Lucy Rodriguez has shared health history...[0A]    Diagnosis  Acute sinusitis, unspecified    Procedures  Value: 30217 Code: CPT-4 UNLISTED E&M SERVICE    Medications Prescribed    No prescriptions ordered    Electronically signed by: Bairon Winslow(NPI [id number])

## 2024-05-16 ENCOUNTER — OFFICE VISIT (OUTPATIENT)
Dept: INTERNAL MEDICINE CLINIC | Facility: CLINIC | Age: 54
End: 2024-05-16

## 2024-05-16 ENCOUNTER — APPOINTMENT (EMERGENCY)
Dept: RADIOLOGY | Facility: HOSPITAL | Age: 54
End: 2024-05-16
Payer: COMMERCIAL

## 2024-05-16 ENCOUNTER — HOSPITAL ENCOUNTER (EMERGENCY)
Facility: HOSPITAL | Age: 54
Discharge: HOME | End: 2024-05-16
Attending: EMERGENCY MEDICINE
Payer: COMMERCIAL

## 2024-05-16 VITALS
OXYGEN SATURATION: 98 % | RESPIRATION RATE: 18 BRPM | HEIGHT: 64 IN | WEIGHT: 150.13 LBS | SYSTOLIC BLOOD PRESSURE: 104 MMHG | TEMPERATURE: 97.6 F | BODY MASS INDEX: 25.63 KG/M2 | DIASTOLIC BLOOD PRESSURE: 70 MMHG | HEART RATE: 74 BPM

## 2024-05-16 VITALS
BODY MASS INDEX: 25.75 KG/M2 | DIASTOLIC BLOOD PRESSURE: 63 MMHG | SYSTOLIC BLOOD PRESSURE: 106 MMHG | TEMPERATURE: 96.7 F | RESPIRATION RATE: 16 BRPM | HEART RATE: 68 BPM | WEIGHT: 150 LBS | OXYGEN SATURATION: 96 %

## 2024-05-16 DIAGNOSIS — E78.41 ELEVATED LP(A): ICD-10-CM

## 2024-05-16 DIAGNOSIS — R07.9 CHEST PAIN, UNSPECIFIED TYPE: Primary | ICD-10-CM

## 2024-05-16 DIAGNOSIS — R07.9 LEFT-SIDED CHEST PAIN: ICD-10-CM

## 2024-05-16 DIAGNOSIS — R53.83 FATIGUE, UNSPECIFIED TYPE: Primary | ICD-10-CM

## 2024-05-16 DIAGNOSIS — R06.02 SOB (SHORTNESS OF BREATH): ICD-10-CM

## 2024-05-16 DIAGNOSIS — R01.1 CARDIAC MURMUR: ICD-10-CM

## 2024-05-16 DIAGNOSIS — R94.31 ABNORMAL EKG: ICD-10-CM

## 2024-05-16 DIAGNOSIS — F32.89 OTHER DEPRESSION: ICD-10-CM

## 2024-05-16 DIAGNOSIS — I95.9 HYPOTENSION, UNSPECIFIED HYPOTENSION TYPE: ICD-10-CM

## 2024-05-16 DIAGNOSIS — N95.1 PERIMENOPAUSAL: ICD-10-CM

## 2024-05-16 DIAGNOSIS — F32.A DEPRESSIVE DISORDER: ICD-10-CM

## 2024-05-16 DIAGNOSIS — Z82.49 FAMILY HISTORY OF EARLY CAD: ICD-10-CM

## 2024-05-16 DIAGNOSIS — R53.83 FATIGUE: ICD-10-CM

## 2024-05-16 LAB
ANION GAP SERPL CALCULATED.3IONS-SCNC: 7 MMOL/L (ref 4–13)
ATRIAL RATE: 78 BPM
BASOPHILS # BLD AUTO: 0.05 THOUSANDS/ÂΜL (ref 0–0.1)
BASOPHILS NFR BLD AUTO: 1 % (ref 0–1)
BUN SERPL-MCNC: 19 MG/DL (ref 5–25)
CALCIUM SERPL-MCNC: 9.5 MG/DL (ref 8.4–10.2)
CARDIAC TROPONIN I PNL SERPL HS: <2 NG/L
CHLORIDE SERPL-SCNC: 103 MMOL/L (ref 96–108)
CO2 SERPL-SCNC: 29 MMOL/L (ref 21–32)
CREAT SERPL-MCNC: 0.75 MG/DL (ref 0.6–1.3)
EOSINOPHIL # BLD AUTO: 0.1 THOUSAND/ÂΜL (ref 0–0.61)
EOSINOPHIL NFR BLD AUTO: 1 % (ref 0–6)
ERYTHROCYTE [DISTWIDTH] IN BLOOD BY AUTOMATED COUNT: 12.3 % (ref 11.6–15.1)
GFR SERPL CREATININE-BSD FRML MDRD: 91 ML/MIN/1.73SQ M
GLUCOSE SERPL-MCNC: 76 MG/DL (ref 65–140)
HCT VFR BLD AUTO: 40.6 % (ref 34.8–46.1)
HGB BLD-MCNC: 13.5 G/DL (ref 11.5–15.4)
IMM GRANULOCYTES # BLD AUTO: 0.02 THOUSAND/UL (ref 0–0.2)
IMM GRANULOCYTES NFR BLD AUTO: 0 % (ref 0–2)
LYMPHOCYTES # BLD AUTO: 1.97 THOUSANDS/ÂΜL (ref 0.6–4.47)
LYMPHOCYTES NFR BLD AUTO: 29 % (ref 14–44)
MCH RBC QN AUTO: 29.8 PG (ref 26.8–34.3)
MCHC RBC AUTO-ENTMCNC: 33.3 G/DL (ref 31.4–37.4)
MCV RBC AUTO: 90 FL (ref 82–98)
MONOCYTES # BLD AUTO: 0.48 THOUSAND/ÂΜL (ref 0.17–1.22)
MONOCYTES NFR BLD AUTO: 7 % (ref 4–12)
NEUTROPHILS # BLD AUTO: 4.28 THOUSANDS/ÂΜL (ref 1.85–7.62)
NEUTS SEG NFR BLD AUTO: 62 % (ref 43–75)
NRBC BLD AUTO-RTO: 0 /100 WBCS
P AXIS: 76 DEGREES
PLATELET # BLD AUTO: 286 THOUSANDS/UL (ref 149–390)
PMV BLD AUTO: 10.3 FL (ref 8.9–12.7)
POTASSIUM SERPL-SCNC: 3.9 MMOL/L (ref 3.5–5.3)
PR INTERVAL: 132 MS
QRS AXIS: 56 DEGREES
QRSD INTERVAL: 82 MS
QT INTERVAL: 402 MS
QTC INTERVAL: 458 MS
RBC # BLD AUTO: 4.53 MILLION/UL (ref 3.81–5.12)
SODIUM SERPL-SCNC: 139 MMOL/L (ref 135–147)
T WAVE AXIS: 70 DEGREES
VENTRICULAR RATE: 78 BPM
WBC # BLD AUTO: 6.9 THOUSAND/UL (ref 4.31–10.16)

## 2024-05-16 PROCEDURE — 99285 EMERGENCY DEPT VISIT HI MDM: CPT

## 2024-05-16 PROCEDURE — 93010 ELECTROCARDIOGRAM REPORT: CPT | Performed by: INTERNAL MEDICINE

## 2024-05-16 PROCEDURE — 99214 OFFICE O/P EST MOD 30 MIN: CPT | Performed by: FAMILY MEDICINE

## 2024-05-16 PROCEDURE — 36415 COLL VENOUS BLD VENIPUNCTURE: CPT

## 2024-05-16 PROCEDURE — 85025 COMPLETE CBC W/AUTO DIFF WBC: CPT

## 2024-05-16 PROCEDURE — 93005 ELECTROCARDIOGRAM TRACING: CPT

## 2024-05-16 PROCEDURE — 93308 TTE F-UP OR LMTD: CPT | Performed by: EMERGENCY MEDICINE

## 2024-05-16 PROCEDURE — 71046 X-RAY EXAM CHEST 2 VIEWS: CPT

## 2024-05-16 PROCEDURE — 80048 BASIC METABOLIC PNL TOTAL CA: CPT

## 2024-05-16 PROCEDURE — 99285 EMERGENCY DEPT VISIT HI MDM: CPT | Performed by: EMERGENCY MEDICINE

## 2024-05-16 PROCEDURE — 93000 ELECTROCARDIOGRAM COMPLETE: CPT | Performed by: FAMILY MEDICINE

## 2024-05-16 PROCEDURE — 84484 ASSAY OF TROPONIN QUANT: CPT

## 2024-05-16 NOTE — PROGRESS NOTES
Ambulatory Visit  Name: Lucy Rodriguez      : 1970      MRN: [id number]  Encounter Provider: Sierra Knuz MD  Encounter Date: 2024   Encounter department: [facility]    Assessment & Plan   1. Fatigue, unspecified type  Assessment & Plan:  Pt has had worsening fatigue over the last few weeks , with this she has noted within the last 11/2 weeks she is having more SOB , and L vague CP , not necessarily related to exertion . She had increased dose of prozac to 40 mg ~ 4 weeks ago , she feels fatigue may be related to the change She was with a friend a few days ago and she asked pt if she was ok as she was SOB , she has also been having episodes of feeling lightheaded , as she may pass out , no CP , SOB or N with that feeling , it passes if she relaxes with time Last night she was going to get on [employer] and she got the lightheaded feeling she took her BP 80/57 , she decided to sit down , sx eventually passed Today during OV she notes feeling more pressure L chest LUQ and her neck .new cardiac murmer heard in supine position  EKG sinus rate 70 shows changes in lead 3 , AVF from prior study , she has family hx of early CAD , personal hx of abnormal exercise stress test , in 2021 , follow up stress echo negative for ischemia , with constellation of sx and ill feeling  recommend evaluation in [facility] ED pt is agreeable , she is stable to drive there   Orders:  -     Echo complete w/ contrast if indicated; Future; Expected date: 2024  -     POCT ECG  -     Transfer to other facility  2. Depressive disorder  Assessment & Plan:  Pt has been taking prozac 40 mg x ~ 4 weeks she feels flatter , no emotion and has had worse fatigue , rec she go back to prozac 20 mg , she has taken wellbutrin and lexapro in the past , had side effects from wellbutrin , lexapro didn't help , will have her continue exercise once cardiac issue is ruled out , perhaps with hormone  replacement she will feel some better , continue healthy diet and hydration she has f/u appt scheduled here June 27 will discuss med change then   3. Perimenopausal  Assessment & Plan:  Pt follows with gyn she has been using estradiol cream x 3 weeks   4. Hypotension, unspecified hypotension type  -     POCT ECG  -     Transfer to other facility  5. SOB (shortness of breath)  -     Echo complete w/ contrast if indicated; Future; Expected date: 05/16/2024  -     POCT ECG  6. Cardiac murmur  -     Echo complete w/ contrast if indicated; Future; Expected date: 05/16/2024  7. Family history of early CAD  -     Transfer to other facility  8. Abnormal EKG  -     Transfer to other facility  9. Left-sided chest pain  -     Transfer to other facility  10. Elevated Lp(a)         History of Present Illness     HPIPt here to discuss ongoing fatigue and more recently having Low BP , noting this last few weeks , measured it last night 80/57 she was ready to get on the treadmill ~ 6:30 pm felt woozy checked her BP , she just rested and sx abated . She can become very lightheaded feel like she is going to pass out , sx pass but she feels drained . She was with a friend earlier this week , she asked pt if she was ok , stating she was noting pt was SOB with just talking  , she doesn't note palps , can have tightness in chest at times unrelated to exertion , s- N - jawor throat pain he feels sob with walking up hills this for some time ? Some worse . The last few days she has L Neck , L lat chest pain , sx come and go not necessarily related to exertion She is thinking the fatigue could be related to the incr dose of prozac , she feels flatter in affect since the increase She has taken welbutrin , lexapro in the past   She follows with card , slight elevated calcium score , katja lipoprotein a next appt 8/1/2024   She was sick over the last week body aches + post nasal drainage teeth hurt , ++ cough no temp she had virtual visit , took  amoxicillin x 6 days now   She just started estradiol cream per gyn , she stopped drinking etoh since starting with illness last week   Review of Systems   Constitutional:  Positive for fatigue. Negative for chills, fever and unexpected weight change.   HENT:  Negative for ear pain and sore throat.    Eyes:  Negative for pain and visual disturbance.   Respiratory:  Positive for cough and shortness of breath.    Cardiovascular:  Positive for chest pain. Negative for palpitations.   Gastrointestinal:  Negative for abdominal pain, nausea and vomiting.   Genitourinary:  Negative for dysuria and hematuria.   Musculoskeletal:  Negative for arthralgias and back pain.   Skin:  Negative for color change and rash.   Neurological:  Positive for dizziness and light-headedness. Negative for seizures and syncope.   Psychiatric/Behavioral:  Positive for sleep disturbance. Negative for self-injury and suicidal ideas.         Depression low mood    All other systems reviewed and are negative.    Past Medical History:   Diagnosis Date    Cancer (HCC)     BCC    Depression 2011    Genital warts     Hearing impaired     Herpes      Past Surgical History:   Procedure Laterality Date    CONDYLOMA EXCISION/FULGURATION  1987    JOINT REPLACEMENT Right     knee     Family History   Problem Relation Age of Onset    Heart disease Mother     Deep vein thrombosis Mother     Hypertension Father     Hyperlipidemia Father     Heart disease Father     Heart failure Maternal Grandmother     Heart attack Maternal Grandmother         age 70 CABG    Heart disease Maternal Grandmother         age 78     Heart attack Paternal Grandfather         age 44     Heart failure Cousin     Heart attack Maternal Grandfather         age 66     Heart disease Maternal Grandfather     Hyperlipidemia Sister      Social History     Tobacco Use    Smoking status: Former     Current packs/day: 0.00     Average packs/day: 0.5 packs/day for 12.9  years (6.5 ttl pk-yrs)     Types: Cigarettes     Start date: 1986     Quit date: 1998     Years since quittin.4    Smokeless tobacco: Never   Vaping Use    Vaping status: Never Used   Substance and Sexual Activity    Alcohol use: Yes     Alcohol/week: 4.0 standard drinks of alcohol     Types: 2 Glasses of wine, 2 Cans of beer per week     Comment: social     Drug use: Not Currently     Types: Marijuana    Sexual activity: Yes     Partners: Male     Birth control/protection: Male Sterilization     Comment: Vasectomy     Current Outpatient Medications on File Prior to Visit   Medication Sig    acetaminophen (TYLENOL) 500 mg tablet Take 1,000 mg by mouth every 6 (six) hours as needed    acyclovir (ZOVIRAX) 400 MG tablet Take 400 mg by mouth daily     amoxicillin (AMOXIL) 500 mg capsule if needed    amoxicillin (AMOXIL) 875 mg tablet Take 1 tablet (875 mg total) by mouth 2 (two) times a day for 10 days    Biotin 10 MG TABS Take by mouth daily    buPROPion (WELLBUTRIN XL) 150 mg 24 hr tablet Take 1 tablet (150 mg total) by mouth every morning    estradiol (ESTRACE) 0.1 mg/g vaginal cream Insert 0.5 g into the vagina 2 (two) times a week Do not start before 2024.    ezetimibe (ZETIA) 10 mg tablet Take 1 tablet (10 mg total) by mouth daily    FLUoxetine (PROzac) 40 MG capsule Take 1 capsule (40 mg total) by mouth daily    trospium (SANCTURA XR) 60 mg 24 hr capsule Take 1 capsule (60 mg total) by mouth daily before breakfast     Allergies   Allergen Reactions    Gabapentin Anxiety and Delirium    Midazolam Nausea Only and Vomiting    Nickel Dermatitis and Itching    Rosuvastatin Myalgia    Titanium Dermatitis, Itching and Other (See Comments)     Immunization History   Administered Date(s) Administered    COVID-19 PFIZER VACCINE 0.3 ML IM 2020, 01/10/2021, 2021     Objective     /70 (BP Location: Left arm, Patient Position: Sitting, Cuff Size: Large)   Pulse 74   Temp 97.6 °F  "(36.4 °C) (Temporal)   Resp 18   Ht 5' 4\" (1.626 m)   Wt 68.1 kg (150 lb 2 oz)   SpO2 98%   BMI 25.77 kg/m²     Physical Exam  Vitals and nursing note reviewed.   Constitutional:       General: She is not in acute distress.     Appearance: She is well-developed.      Comments: Skin with good color turgor , well hydrated ,no distress noted    HENT:      Head: Atraumatic.      Right Ear: Decreased hearing noted.      Left Ear: Decreased hearing noted.      Ears:      Comments: Bilateral hearing aids   Eyes:      Conjunctiva/sclera: Conjunctivae normal.   Neck:      Thyroid: No thyromegaly.   Cardiovascular:      Rate and Rhythm: Normal rate and regular rhythm.      Heart sounds: Murmur heard.      Systolic murmur is present with a grade of 2/6.   Pulmonary:      Effort: Pulmonary effort is normal. No respiratory distress.      Breath sounds: Normal breath sounds.   Chest:      Chest wall: No deformity.   Abdominal:      Palpations: Abdomen is soft.      Tenderness: There is no abdominal tenderness.   Musculoskeletal:         General: No swelling.      Cervical back: Neck supple.   Lymphadenopathy:      Cervical:      Right cervical: No superficial cervical adenopathy.     Left cervical: No superficial cervical adenopathy.   Skin:     General: Skin is warm and dry.      Capillary Refill: Capillary refill takes less than 2 seconds.   Neurological:      Mental Status: She is alert.      Comments: Non focal exam    Psychiatric:         Mood and Affect: Mood is anxious.         Speech: Speech normal.         Thought Content: Thought content normal.       Administrative Statements         "

## 2024-05-16 NOTE — ED PROCEDURE NOTE
Procedure  POC Cardiac US    Date/Time: 5/16/2024 4:00 PM    Performed by: Mell Scott DO  Authorized by: Mell Scott DO    Patient location:  ED  Other Assisting Provider: Yes (comment) (Dr. Shakir Leonardo)    Procedure details:     Exam Type:  Diagnostic    Indications: suspected volume depletion and chest pain      Assessment / Evaluation for: cardiac function, pericardial effusion, inferior vena cava for fluid responsiveness and intravascular volume status      Exam Type: initial exam      Image quality: diagnostic      Image availability:  Images available in PACS  Patient Details:     Cardiac Rhythm:  Regular  Cardiac findings:     Echo technique: limited 2D      Views obtained: parasternal long axis, parasternal short axis, subcostal and apical      Pericardial effusion: absent      Tamponade physiology: absent      Wall motion: normal      LV systolic function: normal      RV dilation: none    IVC findings:     IVC Size: small      IVC Inspiratory Collapse: partial                     Mell Scott DO  05/16/24 1609

## 2024-05-16 NOTE — ED ATTENDING ATTESTATION
5/16/2024  I, Sherita Leonardo MD, saw and evaluated the patient. I have discussed the patient with the resident/non-physician practitioner and agree with the resident's/non-physician practitioner's findings, Plan of Care, and MDM as documented in the resident's/non-physician practitioner's note, except where noted. All available labs and Radiology studies were reviewed.  I was present for key portions of any procedure(s) performed by the resident/non-physician practitioner and I was immediately available to provide assistance.       At this point I agree with the current assessment done in the Emergency Department.  I have conducted an independent evaluation of this patient a history and physical is as follows:  53-year-old woman here with feeling weak, tired, and not very energetic for couple of weeks.  Also had a little bit of chest pressure earlier today.  Patient states that she has had an increase in her Prozac dose because her doctor had thought maybe it was secondary to depression.  The patient states that she does not feel suicidal, states that she feels a lot of emotional numbing related to the Prozac, but also just feels tired.  States she has a difficult time getting out of bed in the morning.  No real shortness of breath, just lacking of energy.  No fevers or chills.  No nausea, vomiting, or diaphoresis.  Review of systems otherwise -12 systems reviewed.  On exam vital signs were reviewed.  Patient is awake, alert, interactive.  The patient's pupils are equally round reactive to light.  Oropharynx is clear with moist mucous membranes.  Neck is supple and nontender with no adenopathy or JVD.  Heart is regular with no murmurs, rubs, or gallops.  Lungs are clear and equal with no wheezes, rales, or rhonchi.  Abdomen is soft and nontender with no masses, rebound, or guarding. There is no CVA tenderness.  The patient was completely exposed.  There is no skin breakdown.  There are no rashes or skin  "changes.  Extremities are warm and well perfused with good pulses. The patient has normal strength, sensation, and cranial nerves.MEDICAL DECISION MAKING    Number and Complexity of Problems  Differential diagnosis: Global weakness, doubt cardiac, doubt pulmonary embolus, doubt dehydration, doubt symptomatic anemia    Medical Decision Making Data  External documents reviewed: Patient's labs from 2 months ago reviewed, patient with normal thyroid, normal testosterone  My EKG interpretation: Sinus, nonspecific ST abnormalities, no old for comparison  My CT interpretation:   My X-ray interpretation:   My ultrasound interpretation: Normal bedside ultrasound, normal cardiac function    US bedside procedure   Final Result      XR chest 2 views    (Results Pending)       Labs Reviewed   HS TROPONIN I 0HR - Normal       Result Value Ref Range Status    hs TnI 0hr <2  \"Refer to ACS Flowchart\"- see link ng/L Final    Comment:                                              Initial (time 0) result  If >=50 ng/L, Myocardial injury suggested ;  Type of myocardial injury and treatment strategy  to be determined.  If 5-49 ng/L, a delta result at 2 hours and or 4 hours will be needed to further evaluate.  If <4 ng/L, and chest pain has been >3 hours since onset, patient may qualify for discharge based on the HEART score in the ED.  If <5 ng/L and <3hours since onset of chest pain, a delta result at 2 hours will be needed to further evaluate.    HS Troponin 99th Percentile URL of a Health Population=12 ng/L with a 95% Confidence Interval of 8-18 ng/L.    Second Troponin (time 2 hours)  If calculated delta >= 20 ng/L,  Myocardial injury suggested ; Type of myocardial injury and treatment strategy to be determined.  If 5-49 ng/L and the calculated delta is 5-19 ng/L, consult medical service for evaluation.  Continue evaluation for ischemia on ecg and other possible etiology and repeat hs troponin at 4 hours.  If delta is <5 ng/L at 2 " hours, consider discharge based on risk stratification via the HEART score (if in ED), or MIKI risk score in IP/Observation.    HS Troponin 99th Percentile URL of a Health Population=12 ng/L with a 95% Confidence Interval of 8-18 ng/L.   CBC AND DIFFERENTIAL    WBC 6.90  4.31 - 10.16 Thousand/uL Final    RBC 4.53  3.81 - 5.12 Million/uL Final    Hemoglobin 13.5  11.5 - 15.4 g/dL Final    Hematocrit 40.6  34.8 - 46.1 % Final    MCV 90  82 - 98 fL Final    MCH 29.8  26.8 - 34.3 pg Final    MCHC 33.3  31.4 - 37.4 g/dL Final    RDW 12.3  11.6 - 15.1 % Final    MPV 10.3  8.9 - 12.7 fL Final    Platelets 286  149 - 390 Thousands/uL Final    nRBC 0  /100 WBCs Final    Segmented % 62  43 - 75 % Final    Immature Grans % 0  0 - 2 % Final    Lymphocytes % 29  14 - 44 % Final    Monocytes % 7  4 - 12 % Final    Eosinophils Relative 1  0 - 6 % Final    Basophils Relative 1  0 - 1 % Final    Absolute Neutrophils 4.28  1.85 - 7.62 Thousands/µL Final    Absolute Immature Grans 0.02  0.00 - 0.20 Thousand/uL Final    Absolute Lymphocytes 1.97  0.60 - 4.47 Thousands/µL Final    Absolute Monocytes 0.48  0.17 - 1.22 Thousand/µL Final    Eosinophils Absolute 0.10  0.00 - 0.61 Thousand/µL Final    Basophils Absolute 0.05  0.00 - 0.10 Thousands/µL Final   BASIC METABOLIC PANEL    Sodium 139  135 - 147 mmol/L Final    Potassium 3.9  3.5 - 5.3 mmol/L Final    Chloride 103  96 - 108 mmol/L Final    CO2 29  21 - 32 mmol/L Final    ANION GAP 7  4 - 13 mmol/L Final    BUN 19  5 - 25 mg/dL Final    Creatinine 0.75  0.60 - 1.30 mg/dL Final    Comment: Standardized to IDMS reference method    Glucose 76  65 - 140 mg/dL Final    Comment: If the patient is fasting, the ADA then defines impaired fasting glucose as > 100 mg/dL and diabetes as > or equal to 123 mg/dL.    Calcium 9.5  8.4 - 10.2 mg/dL Final    eGFR 91  ml/min/1.73sq m Final    Narrative:     National Kidney Disease Foundation guidelines for Chronic Kidney Disease (CKD):     Stage 1 with  normal or high GFR (GFR > 90 mL/min/1.73 square meters)    Stage 2 Mild CKD (GFR = 60-89 mL/min/1.73 square meters)    Stage 3A Moderate CKD (GFR = 45-59 mL/min/1.73 square meters)    Stage 3B Moderate CKD (GFR = 30-44 mL/min/1.73 square meters)    Stage 4 Severe CKD (GFR = 15-29 mL/min/1.73 square meters)    Stage 5 End Stage CKD (GFR <15 mL/min/1.73 square meters)  Note: GFR calculation is accurate only with a steady state creatinine   HS TROPONIN I 2HR   HS TROPONIN I 4HR       Labs reviewed by me are significant for:     Clinical decision rules/scores are significant for:     Discussed case with:   Considered admission for:     Treatment and Disposition  ED course: Seen and examined, will do cardiac evaluation, delta troponin, anticipate discharge to follow-up  Shared decision making:   Code status:     ED Course         Critical Care Time  Procedures

## 2024-05-16 NOTE — ASSESSMENT & PLAN NOTE
Pt has had worsening fatigue over the last few weeks , with this she has noted within the last 11/2 weeks she is having more SOB , and L vague CP , not necessarily related to exertion . She had increased dose of prozac to 40 mg ~ 4 weeks ago , she feels fatigue may be related to the change She was with a friend a few days ago and she asked pt if she was ok as she was SOB , she has also been having episodes of feeling lightheaded , as she may pass out , no CP , SOB or N with that feeling , it passes if she relaxes with time Last night she was going to get on [employer] and she got the lightheaded feeling she took her BP 80/57 , she decided to sit down , sx eventually passed Today during OV she notes feeling more pressure L chest LUQ and her neck .new cardiac murmer heard in supine position  EKG sinus rate 70 shows changes in lead 3 , AVF from prior study , she has family hx of early CAD , personal hx of abnormal exercise stress test , in 6/2021 , follow up stress echo negative for ischemia , with constellation of sx and ill feeling  recommend evaluation in [facility] ED pt is agreeable , she is stable to drive there

## 2024-05-16 NOTE — ASSESSMENT & PLAN NOTE
Pt has been taking prozac 40 mg x ~ 4 weeks she feels flatter , no emotion and has had worse fatigue , rec she go back to prozac 20 mg , she has taken wellbutrin and lexapro in the past , had side effects from wellbutrin , lexapro didn't help , will have her continue exercise once cardiac issue is ruled out , perhaps with hormone replacement she will feel some better , continue healthy diet and hydration she has f/u appt scheduled here June 27 will discuss med change then

## 2024-05-16 NOTE — DISCHARGE INSTRUCTIONS
Please follow up with cardiologist and PCP.    Cardiac testing negative.    Return to the ED with any new/concerning issues.

## 2024-05-17 RX ORDER — FLUOXETINE HYDROCHLORIDE 20 MG/1
20 CAPSULE ORAL DAILY
Qty: 90 CAPSULE | Refills: 1 | Status: SHIPPED | OUTPATIENT
Start: 2024-05-17

## 2024-05-17 NOTE — ED PROVIDER NOTES
History  Chief Complaint   Patient presents with    Lethargy     Pt reports fatigue X 2-3 weeks, recent increase in prozac dose, also pain under left breast area with SOB, and nausea, dizziness and headache     HPI    Patient is a 53-year-old female with past medical history of depression, presenting to the ED for evaluation of left-sided chest discomfort, as well as few week history of generalized fatigue.  Patient states that her symptoms started with fatigue.  She has noticed that since increasing her Prozac about 1 month ago, she has been experiencing total body fatigue.  Also notes that he is she is perimenopausal and does not know if this is playing a role in her symptoms.  In addition, patient states that over the last several days she has developed some intermittent left-sided chest tightness, which occasionally radiates up to the left shoulder, associated with dyspnea with exertion.  Patient states that she normally uses her Pel[employer] bike 3-5 times per week, but has noticed more shortness of breath than usual lately.  She does state that she had an upper respiratory infection a few weeks ago, but this is all resolved.  Patient denies any recent fevers, chills, cough or congestion, abdominal pain, nausea, vomiting, diarrhea, constipation, urinary complaints.  She currently does not have any chest discomfort.  Of note, patient does note that her maternal grandparents had early cardiac disease, and she herself has elevated lipoprotein a levels, and is currently being treated with Zetia.      Prior to Admission Medications   Prescriptions Last Dose Informant Patient Reported? Taking?   Biotin 10 MG TABS  Self Yes No   Sig: Take by mouth daily   FLUoxetine (PROzac) 40 MG capsule   No No   Sig: Take 1 capsule (40 mg total) by mouth daily   acetaminophen (TYLENOL) 500 mg tablet  Self Yes No   Sig: Take 1,000 mg by mouth every 6 (six) hours as needed   acyclovir (ZOVIRAX) 400 MG tablet  Self Yes No   Sig: Take 400  mg by mouth daily    amoxicillin (AMOXIL) 500 mg capsule  Self Yes No   Sig: if needed   amoxicillin (AMOXIL) 875 mg tablet   No No   Sig: Take 1 tablet (875 mg total) by mouth 2 (two) times a day for 10 days   buPROPion (WELLBUTRIN XL) 150 mg 24 hr tablet   No No   Sig: Take 1 tablet (150 mg total) by mouth every morning   estradiol (ESTRACE) 0.1 mg/g vaginal cream   No No   Sig: Insert 0.5 g into the vagina 2 (two) times a week Do not start before 2024.   ezetimibe (ZETIA) 10 mg tablet  Self No No   Sig: Take 1 tablet (10 mg total) by mouth daily   trospium (SANCTURA XR) 60 mg 24 hr capsule   No No   Sig: Take 1 capsule (60 mg total) by mouth daily before breakfast      Facility-Administered Medications: None       Past Medical History:   Diagnosis Date    Cancer (HCC)     BCC    Depression 2011    Genital warts     Hearing impaired     Herpes        Past Surgical History:   Procedure Laterality Date    CONDYLOMA EXCISION/FULGURATION  1987    JOINT REPLACEMENT Right     knee       Family History   Problem Relation Age of Onset    Heart disease Mother     Deep vein thrombosis Mother     Hypertension Father     Hyperlipidemia Father     Heart disease Father     Heart failure Maternal Grandmother     Heart attack Maternal Grandmother         age 70 CABG    Heart disease Maternal Grandmother         age 78     Heart attack Paternal Grandfather         age 44     Heart failure Cousin     Heart attack Maternal Grandfather         age 66     Heart disease Maternal Grandfather     Hyperlipidemia Sister      I have reviewed and agree with the history as documented.    E-Cigarette/Vaping    E-Cigarette Use Never User      E-Cigarette/Vaping Substances    Nicotine No     THC No     CBD No     Flavoring No     Other No     Unknown No      Social History     Tobacco Use    Smoking status: Former     Current packs/day: 0.00     Average packs/day: 0.5 packs/day for 12.9 years (6.5 ttl  pk-yrs)     Types: Cigarettes     Start date: 1986     Quit date: 1998     Years since quittin.4    Smokeless tobacco: Never   Vaping Use    Vaping status: Never Used   Substance Use Topics    Alcohol use: Yes     Alcohol/week: 4.0 standard drinks of alcohol     Types: 2 Glasses of wine, 2 Cans of beer per week     Comment: social     Drug use: Not Currently     Types: Marijuana        Review of Systems   All other systems reviewed and are negative.      Physical Exam  ED Triage Vitals   Temperature Pulse Respirations Blood Pressure SpO2   24 1442 24 1442 24 1442 24 1442 24 1442   (!) 96.7 °F (35.9 °C) 89 18 143/85 100 %      Temp Source Heart Rate Source Patient Position - Orthostatic VS BP Location FiO2 (%)   24 1442 24 1442 24 1442 24 1442 --   Temporal Monitor Lying Left arm       Pain Score       24 1700       2             Orthostatic Vital Signs  Vitals:    24 1442 24 1700   BP: 143/85 106/63   Pulse: 89 68   Patient Position - Orthostatic VS: Lying Lying       Physical Exam  Vitals and nursing note reviewed.   Constitutional:       General: She is not in acute distress.     Appearance: Normal appearance. She is well-developed and normal weight. She is not ill-appearing or toxic-appearing.   HENT:      Head: Normocephalic and atraumatic.      Right Ear: External ear normal.      Left Ear: External ear normal.      Nose: Nose normal. No congestion or rhinorrhea.      Mouth/Throat:      Mouth: Mucous membranes are moist.      Pharynx: Oropharynx is clear.   Eyes:      General: No scleral icterus.     Conjunctiva/sclera: Conjunctivae normal.   Cardiovascular:      Rate and Rhythm: Normal rate and regular rhythm.      Pulses: Normal pulses.      Heart sounds: Normal heart sounds. No murmur heard.  Pulmonary:      Effort: Pulmonary effort is normal. No respiratory distress.      Breath sounds: Normal breath sounds. No wheezing,  rhonchi or rales.   Chest:      Chest wall: No tenderness.   Abdominal:      General: Abdomen is flat.      Palpations: Abdomen is soft.      Tenderness: There is no abdominal tenderness.   Musculoskeletal:         General: No swelling. Normal range of motion.      Cervical back: Normal range of motion and neck supple. No tenderness.      Right lower leg: No edema.      Left lower leg: No edema.   Skin:     General: Skin is warm and dry.      Capillary Refill: Capillary refill takes less than 2 seconds.      Findings: No erythema.   Neurological:      General: No focal deficit present.      Mental Status: She is alert and oriented to person, place, and time.      Motor: No weakness.   Psychiatric:         Mood and Affect: Mood normal.         ED Medications  Medications - No data to display    Diagnostic Studies  Results Reviewed       Procedure Component Value Units Date/Time    HS Troponin 0hr (reflex protocol) [[id number]]  (Normal) Collected: 05/16/24 1537    Lab Status: Final result Specimen: Blood from Arm, Left Updated: 05/16/24 1608     hs TnI 0hr <2 ng/L     Basic metabolic panel [[id number]] Collected: 05/16/24 1537    Lab Status: Final result Specimen: Blood from Arm, Left Updated: 05/16/24 1606     Sodium 139 mmol/L      Potassium 3.9 mmol/L      Chloride 103 mmol/L      CO2 29 mmol/L      ANION GAP 7 mmol/L      BUN 19 mg/dL      Creatinine 0.75 mg/dL      Glucose 76 mg/dL      Calcium 9.5 mg/dL      eGFR 91 ml/min/1.73sq m     Narrative:      National Kidney Disease Foundation guidelines for Chronic Kidney Disease (CKD):     Stage 1 with normal or high GFR (GFR > 90 mL/min/1.73 square meters)    Stage 2 Mild CKD (GFR = 60-89 mL/min/1.73 square meters)    Stage 3A Moderate CKD (GFR = 45-59 mL/min/1.73 square meters)    Stage 3B Moderate CKD (GFR = 30-44 mL/min/1.73 square meters)    Stage 4 Severe CKD (GFR = 15-29 mL/min/1.73 square meters)    Stage 5 End Stage CKD (GFR <15 mL/min/1.73 square  meters)  Note: GFR calculation is accurate only with a steady state creatinine    CBC and differential [[id number]] Collected: 05/16/24 1537    Lab Status: Final result Specimen: Blood from Arm, Left Updated: 05/16/24 1548     WBC 6.90 Thousand/uL      RBC 4.53 Million/uL      Hemoglobin 13.5 g/dL      Hematocrit 40.6 %      MCV 90 fL      MCH 29.8 pg      MCHC 33.3 g/dL      RDW 12.3 %      MPV 10.3 fL      Platelets 286 Thousands/uL      nRBC 0 /100 WBCs      Segmented % 62 %      Immature Grans % 0 %      Lymphocytes % 29 %      Monocytes % 7 %      Eosinophils Relative 1 %      Basophils Relative 1 %      Absolute Neutrophils 4.28 Thousands/µL      Absolute Immature Grans 0.02 Thousand/uL      Absolute Lymphocytes 1.97 Thousands/µL      Absolute Monocytes 0.48 Thousand/µL      Eosinophils Absolute 0.10 Thousand/µL      Basophils Absolute 0.05 Thousands/µL                    XR chest 2 views   ED Interpretation by Andrei Be,  (05/16 1631)   NAD as interpreted by me        US bedside procedure   Final Result by Interface, Externalimages (05/16 1534)            Procedures  Procedures      ED Course  ED Course as of 05/16/24 2113   Thu May 16, 2024   1522 EKG: NSR at 78 bpm, normal axis, normal intervals, no acute ischemic changes as interpreted by me   1628 hs TnI 0hr: <2   1631 Labs reviewed. Troponin <2. Heart risk score 2. Based on labs and HEART stratification, patient can be safely discharged. Reviewed the labs with patient at bedside. She feels reassured. Instructed patient to follow up with her cardiologist about this ED visit, and she agrees with that plan.     Patient is a 53-year-old female presented to the ED for evaluation of few week history of generalized fatigue, as well as left-sided chest discomfort.  History and clinical exam documented above.  Differential includes ACS, anemia, electrolyte imbalance, possible medication side effect, postviral syndrome.  Will do cardiac  workup.    Diagnostics reviewed by me and with patient at bedside.  She does not have any evidence of anemia, abnormal cell counts, electrolyte derangements, and her troponin is negative.  Chest x-ray reviewed and is nonconcerning for any pathology.  Patient is resting comfortably and has remained hemodynamically stable during her ED course.  Believe that the patient is stable for discharge at this time, I reviewed all results at bedside with her and advised that she follow-up with cardiology.    I gave oral return precautions for what to return for in addition to the written return precautions.   The patient verbalized understanding of the discharge instructions and warnings that would necessitate return to the Emergency Department.  I specifically highlighted areas of special concern regarding the written and verbal discharge instructions and return precautions.    All questions were answered prior to discharge.          HEART Risk Score      Flowsheet Row Most Recent Value   Heart Score Risk Calculator    History 0 Filed at: 05/16/2024 1628   ECG 0 Filed at: 05/16/2024 1628   Age 1 Filed at: 05/16/2024 1628   Risk Factors 1 Filed at: 05/16/2024 1628   Troponin 0 Filed at: 05/16/2024 1628   HEART Score 2 Filed at: 05/16/2024 1628                        SBIRT 22yo+      Flowsheet Row Most Recent Value   Initial Alcohol Screen: US AUDIT-C     1. How often do you have a drink containing alcohol? 0 Filed at: 05/16/2024 1444   3a. Male UNDER 65: How often do you have five or more drinks on one occasion? 0 Filed at: 05/16/2024 1444   3b. FEMALE Any Age, or MALE 65+: How often do you have 4 or more drinks on one occassion? 0 Filed at: 05/16/2024 1444   Audit-C Score 0 Filed at: 05/16/2024 1444   DEVAN: How many times in the past year have you...    Used an illegal drug or used a prescription medication for non-medical reasons? Never Filed at: 05/16/2024 1444                  Medical Decision Making  Amount and/or  Complexity of Data Reviewed  Labs: ordered. Decision-making details documented in ED Course.  Radiology: ordered and independent interpretation performed.          Disposition  Final diagnoses:   Chest pain, unspecified type   Fatigue     Time reflects when diagnosis was documented in both MDM as applicable and the Disposition within this note       Time User Action Codes Description Comment    5/16/2024  4:33 PM Andrei Be [R07.9] Chest pain, unspecified type     5/16/2024  4:33 PM Andrei Be [R53.83] Fatigue           ED Disposition       ED Disposition   Discharge    Condition   Stable    Date/Time   Thu May 16, 2024 1632    Comment   Lucy Rodriguez discharge to home/self care.                   Follow-up Information       Follow up With Specialties Details Why Contact Info    Sierra Kunz MD Family Medicine Schedule an appointment as soon as possible for a visit   [address] [place] [address]   [address]  [place] [id number] [phone]              Discharge Medication List as of 5/16/2024  4:34 PM        CONTINUE these medications which have NOT CHANGED    Details   acetaminophen (TYLENOL) 500 mg tablet Take 1,000 mg by mouth every 6 (six) hours as needed, Historical Med      acyclovir (ZOVIRAX) 400 MG tablet Take 400 mg by mouth daily , Starting Mon 8/20/2018, Historical Med      amoxicillin (AMOXIL) 500 mg capsule if needed, Starting Mon 12/6/2021, Historical Med      amoxicillin (AMOXIL) 875 mg tablet Take 1 tablet (875 mg total) by mouth 2 (two) times a day for 10 days, Starting Thu 5/9/2024, Until Sun 5/19/2024, Normal      Biotin 10 MG TABS Take by mouth daily, Historical Med      buPROPion (WELLBUTRIN XL) 150 mg 24 hr tablet Take 1 tablet (150 mg total) by mouth every morning, Starting Thu 3/21/2024, Until Tue 9/17/2024, Normal      estradiol (ESTRACE) 0.1 mg/g vaginal cream Insert 0.5 g into the vagina 2 (two) times a week Do not start before April 18, 2024., Starting u 4/18/2024,  Normal      ezetimibe (ZETIA) 10 mg tablet Take 1 tablet (10 mg total) by mouth daily, Starting Fri 4/7/2023, Normal      FLUoxetine (PROzac) 40 MG capsule Take 1 capsule (40 mg total) by mouth daily, Starting Wed 5/8/2024, Normal      trospium (SANCTURA XR) 60 mg 24 hr capsule Take 1 capsule (60 mg total) by mouth daily before breakfast, Starting Fri 4/19/2024, Normal           No discharge procedures on file.    PDMP Review       None             ED Provider  Attending physically available and evaluated Lucy Rodriguez. I managed the patient along with the ED Attending.    Electronically Signed by           Andrei Be,   05/16/24 2070

## 2024-05-23 ENCOUNTER — HOSPITAL ENCOUNTER (OUTPATIENT)
Dept: NON INVASIVE DIAGNOSTICS | Facility: HOSPITAL | Age: 54
Discharge: HOME | End: 2024-05-23
Attending: FAMILY MEDICINE
Payer: COMMERCIAL

## 2024-05-23 VITALS
BODY MASS INDEX: 25.61 KG/M2 | HEIGHT: 64 IN | DIASTOLIC BLOOD PRESSURE: 63 MMHG | HEART RATE: 67 BPM | WEIGHT: 150 LBS | SYSTOLIC BLOOD PRESSURE: 106 MMHG

## 2024-05-23 DIAGNOSIS — R53.83 FATIGUE, UNSPECIFIED TYPE: ICD-10-CM

## 2024-05-23 DIAGNOSIS — R01.1 CARDIAC MURMUR: ICD-10-CM

## 2024-05-23 DIAGNOSIS — R06.02 SOB (SHORTNESS OF BREATH): ICD-10-CM

## 2024-05-23 LAB
AORTIC ROOT: 3.3 CM
APICAL FOUR CHAMBER EJECTION FRACTION: 60 %
ASCENDING AORTA: 2.8 CM
BSA FOR ECHO PROCEDURE: 1.73 M2
E WAVE DECELERATION TIME: 182 MS
E/A RATIO: 1.07
FRACTIONAL SHORTENING: 35 (ref 28–44)
INTERVENTRICULAR SEPTUM IN DIASTOLE (PARASTERNAL SHORT AXIS VIEW): 0.7 CM
INTERVENTRICULAR SEPTUM: 0.7 CM (ref 0.6–1.1)
LAAS-AP2: 12.9 CM2
LAAS-AP4: 13.7 CM2
LEFT ATRIUM SIZE: 3.2 CM
LEFT ATRIUM VOLUME (MOD BIPLANE): 32 ML
LEFT ATRIUM VOLUME INDEX (MOD BIPLANE): 18.5 ML/M2
LEFT INTERNAL DIMENSION IN SYSTOLE: 3.3 CM (ref 2.1–4)
LEFT VENTRICULAR INTERNAL DIMENSION IN DIASTOLE: 5.1 CM (ref 3.5–6)
LEFT VENTRICULAR POSTERIOR WALL IN END DIASTOLE: 0.7 CM
LEFT VENTRICULAR STROKE VOLUME: 77 ML
LVSV (TEICH): 77 ML
MV E'TISSUE VEL-LAT: 17 CM/S
MV E'TISSUE VEL-SEP: 11 CM/S
MV PEAK A VEL: 0.75 M/S
MV PEAK E VEL: 80 CM/S
MV STENOSIS PRESSURE HALF TIME: 53 MS
MV VALVE AREA P 1/2 METHOD: 4.15
RA PRESSURE ESTIMATED: 3 MMHG
RIGHT ATRIUM AREA SYSTOLE A4C: 11.7 CM2
RIGHT VENTRICLE ID DIMENSION: 3.5 CM
RV PSP: 9 MMHG
SL CV LEFT ATRIUM LENGTH A2C: 4.6 CM
SL CV LV EF: 55
SL CV PED ECHO LEFT VENTRICLE DIASTOLIC VOLUME (MOD BIPLANE) 2D: 122 ML
SL CV PED ECHO LEFT VENTRICLE SYSTOLIC VOLUME (MOD BIPLANE) 2D: 45 ML
TR MAX PG: 6 MMHG
TR PEAK VELOCITY: 1.3 M/S
TRICUSPID ANNULAR PLANE SYSTOLIC EXCURSION: 2.6 CM
TRICUSPID VALVE PEAK REGURGITATION VELOCITY: 1.27 M/S

## 2024-05-23 PROCEDURE — 93306 TTE W/DOPPLER COMPLETE: CPT | Performed by: INTERNAL MEDICINE

## 2024-05-23 PROCEDURE — 93306 TTE W/DOPPLER COMPLETE: CPT

## 2024-05-30 ENCOUNTER — HOSPITAL ENCOUNTER (OUTPATIENT)
Dept: NON INVASIVE DIAGNOSTICS | Facility: CLINIC | Age: 54
Discharge: HOME | End: 2024-05-30

## 2024-06-04 DIAGNOSIS — R93.1 ELEVATED CORONARY ARTERY CALCIUM SCORE: ICD-10-CM

## 2024-06-04 DIAGNOSIS — E78.41 ELEVATED LP(A): Primary | ICD-10-CM

## 2024-06-04 DIAGNOSIS — R53.83 OTHER FATIGUE: ICD-10-CM

## 2024-06-04 DIAGNOSIS — E78.5 DYSLIPIDEMIA: ICD-10-CM

## 2024-06-06 ENCOUNTER — HOSPITAL ENCOUNTER (OUTPATIENT)
Dept: NON INVASIVE DIAGNOSTICS | Facility: CLINIC | Age: 54
Discharge: HOME | End: 2024-06-06
Payer: COMMERCIAL

## 2024-06-06 VITALS
SYSTOLIC BLOOD PRESSURE: 108 MMHG | HEART RATE: 87 BPM | OXYGEN SATURATION: 98 % | HEIGHT: 64 IN | DIASTOLIC BLOOD PRESSURE: 72 MMHG | BODY MASS INDEX: 25.61 KG/M2 | WEIGHT: 150 LBS

## 2024-06-06 DIAGNOSIS — E78.41 ELEVATED LP(A): ICD-10-CM

## 2024-06-06 DIAGNOSIS — R53.83 OTHER FATIGUE: ICD-10-CM

## 2024-06-06 DIAGNOSIS — R93.1 ELEVATED CORONARY ARTERY CALCIUM SCORE: ICD-10-CM

## 2024-06-06 DIAGNOSIS — E78.5 DYSLIPIDEMIA: ICD-10-CM

## 2024-06-06 LAB
CHEST PAIN STATEMENT: NORMAL
MAX DIASTOLIC BP: 78 MMHG
MAX HR PERCENT: 87 %
MAX HR: 146 BPM
MAX PREDICTED HEART RATE: 167 BPM
PROTOCOL NAME: NORMAL
RATE PRESSURE PRODUCT: NORMAL
REASON FOR TERMINATION: NORMAL
SL CV STRESS RECOVERY BP: NORMAL MMHG
SL CV STRESS RECOVERY HR: 91 BPM
SL CV STRESS RECOVERY O2 SAT: 97 %
SL CV STRESS STAGE REACHED: 4
STRESS ANGINA INDEX: 0
STRESS BASELINE BP: NORMAL MMHG
STRESS BASELINE HR: 87 BPM
STRESS O2 SAT REST: 98 %
STRESS PEAK HR: 146 BPM
STRESS POST ESTIMATED WORKLOAD: 12.6 METS
STRESS POST EXERCISE DUR MIN: 9 MIN
STRESS POST EXERCISE DUR MIN: 9 MIN
STRESS POST EXERCISE DUR SEC: 46 SEC
STRESS POST EXERCISE DUR SEC: 46 SEC
STRESS POST O2 SAT PEAK: 97 %
STRESS POST PEAK BP: 144 MMHG
STRESS POST PEAK HR: 146 BPM
STRESS POST PEAK SYSTOLIC BP: 144 MMHG
TARGET HR FORMULA: NORMAL
TEST INDICATION: NORMAL

## 2024-06-06 PROCEDURE — 93016 CV STRESS TEST SUPVJ ONLY: CPT | Performed by: INTERNAL MEDICINE

## 2024-06-06 PROCEDURE — 93017 CV STRESS TEST TRACING ONLY: CPT

## 2024-06-06 PROCEDURE — 93018 CV STRESS TEST I&R ONLY: CPT | Performed by: INTERNAL MEDICINE

## 2024-06-10 DIAGNOSIS — R11.0 NAUSEA: Primary | ICD-10-CM

## 2024-06-11 ENCOUNTER — TELEPHONE (OUTPATIENT)
Dept: CARDIOLOGY CLINIC | Facility: CLINIC | Age: 54
End: 2024-06-11

## 2024-06-11 NOTE — TELEPHONE ENCOUNTER
----- Message -----  From: Lorenzo Burnett MD  Sent: 6/7/2024   9:19 AM EDT  To: [facility]    Stress test looked good, she did very well, please let her know

## 2024-06-24 NOTE — PROGRESS NOTES
Assessment/Plan:    Recommended monthly SBE, annual CBE and annual screening mammo. ASCCP guidelines reviewed and pap with cotesting noted to be up to date; this low risk patient was advised she meets criteria to d/c pap screening at age 65. No pap done. Colonoscopy noted to be up to date.  Reviewed diet/activity recommendations Calcium 1200 mg and Vit D 600-1000 IU daily.  Discussed postmenopausal considerations and symptoms to report. Kegel exercises as instructed. RTO in one year for routine annual gyn exam or sooner PRN.        Diagnoses and all orders for this visit:    Encounter for gynecological examination (general) (routine) without abnormal findings    Screening mammogram for breast cancer  -     Mammo screening bilateral w 3d & cad; Future    Osteoporosis screening  -     DXA bone density spine hip and pelvis; Future    Menopause  -     DXA bone density spine hip and pelvis; Future        Subjective:      Patient ID: Lucy Rodriguez is a 53 y.o. female.    This patient presents for routine annual gyn exam.  Recent testing showed continued ovarian function.   PBS discussed at last visit. Pt wanted to decrease bladder irritants and was not ready to start meds at that time. Today she states sx have resolved.   She denies  bleeding or spotting, VM sx, pelvic pain, dyspareunia, breast concerns, abnormal discharge, bowel dysfunction, depression/anx.   , sexually active and is monogamous.   Pap/HPV up to date and normal, 6/14/23.  Mammography up to date and normal, 1/9/24. Colonoscopy up to date, 5/31/22.            The following portions of the patient's history were reviewed and updated as appropriate: allergies, current medications, past family history, past medical history, past social history, past surgical history and problem list.    Review of Systems   Constitutional: Negative.    Respiratory: Negative.     Cardiovascular: Negative.    Gastrointestinal: Negative.    Endocrine: Negative.   "  Genitourinary:  Negative for dyspareunia, dysuria, frequency, pelvic pain, urgency, vaginal bleeding, vaginal discharge and vaginal pain.   Musculoskeletal: Negative.    Skin: Negative.    Neurological: Negative.    Psychiatric/Behavioral: Negative.           Objective:      BP 98/72   Pulse 85   Ht 5' 4\" (1.626 m)   Wt 68.9 kg (152 lb)   BMI 26.09 kg/m²          Physical Exam  Vitals and nursing note reviewed. Exam conducted with a chaperone present.   Constitutional:       Appearance: Normal appearance. She is well-developed.   HENT:      Head: Normocephalic and atraumatic.   Neck:      Thyroid: No thyroid mass or thyromegaly.   Cardiovascular:      Rate and Rhythm: Normal rate and regular rhythm.      Heart sounds: Normal heart sounds.   Pulmonary:      Effort: Pulmonary effort is normal.      Breath sounds: Normal breath sounds.   Chest:   Breasts:     Breasts are symmetrical.      Right: No inverted nipple, mass, nipple discharge, skin change or tenderness.      Left: No inverted nipple, mass, nipple discharge, skin change or tenderness.   Abdominal:      General: Bowel sounds are normal.      Palpations: Abdomen is soft.      Tenderness: There is no abdominal tenderness.      Hernia: There is no hernia in the left inguinal area or right inguinal area.   Genitourinary:     General: Normal vulva.      Exam position: Supine.      Pubic Area: No rash.       Labia:         Right: No rash, tenderness, lesion or injury.         Left: No rash, tenderness, lesion or injury.       Urethra: No prolapse, urethral pain, urethral swelling or urethral lesion.      Vagina: Normal. No signs of injury and foreign body. No vaginal discharge, erythema, tenderness, bleeding, lesions or prolapsed vaginal walls.      Cervix: No cervical motion tenderness, discharge, friability, lesion, erythema, cervical bleeding or eversion.      Uterus: Not deviated, not enlarged, not fixed, not tender and no uterine prolapse.       Adnexa:  "        Right: No mass, tenderness or fullness.          Left: No mass, tenderness or fullness.        Rectum: No external hemorrhoid.      Comments: Urethra normal without lesions  No bladder tenderness  Musculoskeletal:         General: Normal range of motion.      Cervical back: Normal range of motion and neck supple.   Lymphadenopathy:      Lower Body: No right inguinal adenopathy. No left inguinal adenopathy.   Skin:     General: Skin is warm and dry.   Neurological:      Mental Status: She is alert and oriented to person, place, and time.   Psychiatric:         Speech: Speech normal.         Behavior: Behavior normal. Behavior is cooperative.

## 2024-06-25 ENCOUNTER — ANNUAL EXAM (OUTPATIENT)
Dept: GYNECOLOGY | Facility: CLINIC | Age: 54
End: 2024-06-25
Payer: COMMERCIAL

## 2024-06-25 VITALS
DIASTOLIC BLOOD PRESSURE: 72 MMHG | WEIGHT: 152 LBS | SYSTOLIC BLOOD PRESSURE: 98 MMHG | HEART RATE: 85 BPM | BODY MASS INDEX: 25.95 KG/M2 | HEIGHT: 64 IN

## 2024-06-25 DIAGNOSIS — Z13.820 OSTEOPOROSIS SCREENING: ICD-10-CM

## 2024-06-25 DIAGNOSIS — Z12.31 SCREENING MAMMOGRAM FOR BREAST CANCER: ICD-10-CM

## 2024-06-25 DIAGNOSIS — Z78.0 MENOPAUSE: ICD-10-CM

## 2024-06-25 DIAGNOSIS — Z01.419 ENCOUNTER FOR GYNECOLOGICAL EXAMINATION (GENERAL) (ROUTINE) WITHOUT ABNORMAL FINDINGS: Primary | ICD-10-CM

## 2024-06-25 PROCEDURE — 99396 PREV VISIT EST AGE 40-64: CPT | Performed by: OBSTETRICS & GYNECOLOGY

## 2024-06-28 ENCOUNTER — APPOINTMENT (OUTPATIENT)
Dept: LAB | Facility: AMBULARY SURGERY CENTER | Age: 54
End: 2024-06-28
Payer: COMMERCIAL

## 2024-06-28 DIAGNOSIS — R79.89 ABNORMAL CBC: ICD-10-CM

## 2024-06-28 LAB
BASOPHILS # BLD AUTO: 0.05 THOUSANDS/ÂΜL (ref 0–0.1)
BASOPHILS NFR BLD AUTO: 1 % (ref 0–1)
EOSINOPHIL # BLD AUTO: 0.11 THOUSAND/ÂΜL (ref 0–0.61)
EOSINOPHIL NFR BLD AUTO: 2 % (ref 0–6)
ERYTHROCYTE [DISTWIDTH] IN BLOOD BY AUTOMATED COUNT: 12.7 % (ref 11.6–15.1)
HCT VFR BLD AUTO: 38.6 % (ref 34.8–46.1)
HGB BLD-MCNC: 12.3 G/DL (ref 11.5–15.4)
IMM GRANULOCYTES # BLD AUTO: 0.02 THOUSAND/UL (ref 0–0.2)
IMM GRANULOCYTES NFR BLD AUTO: 0 % (ref 0–2)
LYMPHOCYTES # BLD AUTO: 1.94 THOUSANDS/ÂΜL (ref 0.6–4.47)
LYMPHOCYTES NFR BLD AUTO: 31 % (ref 14–44)
MCH RBC QN AUTO: 29.9 PG (ref 26.8–34.3)
MCHC RBC AUTO-ENTMCNC: 31.9 G/DL (ref 31.4–37.4)
MCV RBC AUTO: 94 FL (ref 82–98)
MONOCYTES # BLD AUTO: 0.45 THOUSAND/ÂΜL (ref 0.17–1.22)
MONOCYTES NFR BLD AUTO: 7 % (ref 4–12)
NEUTROPHILS # BLD AUTO: 3.68 THOUSANDS/ÂΜL (ref 1.85–7.62)
NEUTS SEG NFR BLD AUTO: 59 % (ref 43–75)
NRBC BLD AUTO-RTO: 0 /100 WBCS
PLATELET # BLD AUTO: 234 THOUSANDS/UL (ref 149–390)
PMV BLD AUTO: 11.4 FL (ref 8.9–12.7)
RBC # BLD AUTO: 4.12 MILLION/UL (ref 3.81–5.12)
WBC # BLD AUTO: 6.25 THOUSAND/UL (ref 4.31–10.16)

## 2024-06-28 PROCEDURE — 36415 COLL VENOUS BLD VENIPUNCTURE: CPT

## 2024-06-28 PROCEDURE — 85025 COMPLETE CBC W/AUTO DIFF WBC: CPT

## 2024-07-01 ENCOUNTER — OFFICE VISIT (OUTPATIENT)
Dept: INTERNAL MEDICINE CLINIC | Facility: CLINIC | Age: 54
End: 2024-07-01

## 2024-07-01 VITALS
BODY MASS INDEX: 26.06 KG/M2 | DIASTOLIC BLOOD PRESSURE: 71 MMHG | TEMPERATURE: 97.7 F | WEIGHT: 151.8 LBS | SYSTOLIC BLOOD PRESSURE: 108 MMHG | HEART RATE: 76 BPM | OXYGEN SATURATION: 99 %

## 2024-07-01 DIAGNOSIS — E55.9 VITAMIN D DEFICIENCY: ICD-10-CM

## 2024-07-01 DIAGNOSIS — R11.0 NAUSEA: ICD-10-CM

## 2024-07-01 DIAGNOSIS — R93.1 ELEVATED CORONARY ARTERY CALCIUM SCORE: ICD-10-CM

## 2024-07-01 DIAGNOSIS — N95.1 PERIMENOPAUSAL: ICD-10-CM

## 2024-07-01 DIAGNOSIS — F32.A DEPRESSIVE DISORDER: ICD-10-CM

## 2024-07-01 DIAGNOSIS — G47.9 SLEEP DISTURBANCE: ICD-10-CM

## 2024-07-01 DIAGNOSIS — R53.83 FATIGUE, UNSPECIFIED TYPE: Primary | ICD-10-CM

## 2024-07-01 DIAGNOSIS — E56.9 VITAMIN DEFICIENCY: ICD-10-CM

## 2024-07-01 DIAGNOSIS — I95.9 HYPOTENSION, UNSPECIFIED HYPOTENSION TYPE: ICD-10-CM

## 2024-07-01 PROCEDURE — 99214 OFFICE O/P EST MOD 30 MIN: CPT | Performed by: FAMILY MEDICINE

## 2024-07-01 RX ORDER — VENLAFAXINE HYDROCHLORIDE 37.5 MG/1
37.5 CAPSULE, EXTENDED RELEASE ORAL
Qty: 30 CAPSULE | Refills: 3 | Status: SHIPPED | OUTPATIENT
Start: 2024-07-01

## 2024-07-01 NOTE — PROGRESS NOTES
Ambulatory Visit  Name: Lucy Rodriguez      : 1970      MRN: [id number]  Encounter Provider: Sierra Kunz MD  Encounter Date: 2024   Encounter department: [facility]    Assessment & Plan   1. Fatigue, unspecified type  Assessment & Plan:  Chronic for patient , multifactorial she sleeps fairly well but wakes feeling un rested , she has been more diligent with using Peloton 5 x per week , she is a vegan and eats healthily she does drink enough water , labs have been nl , consider sleep study  2. Hypotension, unspecified hypotension type  3. Depressive disorder  Assessment & Plan:  Pt has been having depression x years , she has tried different meds , prozac , lexapro , zoloft in the past either didn't help or caused side effects . She has been doing very well with healthy diet , hydration and regular exercise x a few months . She had visited with Reiki  master last week that session went well , she will have f/u visit   Rec start low dose effexor , she will take prozac q other day x 7-10 days then d/c   She will be looking into starting Yoga sessions at least once per week , she is also considering acupuncture , given Keshav Chauhan information f/u here 4 weeks   Orders:  -     venlafaxine (EFFEXOR-XR) 37.5 mg 24 hr capsule; Take 1 capsule (37.5 mg total) by mouth daily with breakfast  4. Perimenopausal  Assessment & Plan:  Pt is following with gyn , she has multi symptoms , labs have been nl , she is using estrace 2 x per week will continue   5. Vitamin D deficiency  Assessment & Plan:  Last level 46 2023 , she will continue same dosing 5000 u q day   6. Vitamin deficiency  -     Vitamin B12; Future  -     Vitamin B6; Future  -     Folate; Future  7. Sleep disturbance  8. Elevated coronary artery calcium score  Assessment & Plan:  Pt following with cardiology, she had recent EST nuclear neg for ischemia  9. Nausea  Assessment & Plan:  Pt saw GI they wanted  her to take omeprazole x 8 weeks , didn't feel any better , has EGD scheduled         History of Present Illness     HPI Pt here to review labs , low BP , had EST negative for ischemia , good exercise capacity for age , pt had panic attack while on vacation 2 weeka ago , saw Kaci master this weekend on 6/29 she had a positive experience she has a follow up appointment in 4 weeks . She has noted that prozac does help her not fly off the handle as much , still feels depressed , she was speaking to gyn and they recommended she try effexor   Ongoing nausea , 3 months she held estrogen cream no help , spoke with GI they asked her to take omeprazole x 8 weeks , she has been taking x 3 weeks , she had a bad cold in early June HA chills nasal congestion and cough chills , COVID test home neg x 2 , she has EGD scheduled   Review of Systems   Constitutional:  Positive for fatigue. Negative for chills and fever.   HENT:  Negative for ear pain and sore throat.    Eyes:  Negative for pain and visual disturbance.   Respiratory:  Negative for cough and shortness of breath.    Cardiovascular:  Negative for chest pain and palpitations.   Gastrointestinal:  Positive for nausea. Negative for abdominal pain and vomiting.   Genitourinary:  Negative for dysuria and hematuria.        Perimenopause    Musculoskeletal:  Negative for arthralgias and back pain.   Skin:  Negative for color change and rash.   Neurological:  Negative for seizures and syncope.   Psychiatric/Behavioral:  Positive for decreased concentration and sleep disturbance. Negative for self-injury and suicidal ideas. The patient is nervous/anxious.    All other systems reviewed and are negative.    Past Medical History:   Diagnosis Date    Cancer (HCC) 2008    BCC    Depression 2011    Genital warts     Hearing impaired     Herpes     Miscarriage     Ovarian cyst      Past Surgical History:   Procedure Laterality Date    CONDYLOMA EXCISION/FULGURATION  1987    DILATION AND  CURETTAGE OF UTERUS      JOINT REPLACEMENT Right     knee     Family History   Problem Relation Age of Onset    Heart disease Mother         Aortic stenosis    Deep vein thrombosis Mother     Osteoarthritis Mother     Thyroid disease Mother     Hypertension Father     Hyperlipidemia Father     Heart disease Father     Seizures Father     Transient ischemic attack Father     Heart failure Maternal Grandmother     Heart attack Maternal Grandmother         age 70 CABG    Heart disease Maternal Grandmother         age 78     Heart attack Paternal Grandfather         age 44     Heart failure Cousin     Heart attack Maternal Grandfather         age 66     Heart disease Maternal Grandfather     Hyperlipidemia Sister      Social History     Tobacco Use    Smoking status: Former     Current packs/day: 0.00     Average packs/day: 0.5 packs/day for 12.9 years (6.5 ttl pk-yrs)     Types: Cigarettes     Start date: 1986     Quit date: 1998     Years since quittin.5    Smokeless tobacco: Never   Vaping Use    Vaping status: Never Used   Substance and Sexual Activity    Alcohol use: Yes     Alcohol/week: 19.0 standard drinks of alcohol     Types: 5 Glasses of wine, 4 Shots of liquor, 10 Standard drinks or equivalent per week     Comment: social     Drug use: Not Currently     Types: Marijuana    Sexual activity: Not Currently     Partners: Male     Birth control/protection: Male Sterilization     Comment: Too painful     Current Outpatient Medications on File Prior to Visit   Medication Sig    acetaminophen (TYLENOL) 500 mg tablet Take 1,000 mg by mouth every 6 (six) hours as needed    amoxicillin (AMOXIL) 500 mg capsule if needed    Biotin 10 MG TABS Take by mouth daily    estradiol (ESTRACE) 0.1 mg/g vaginal cream Insert 0.5 g into the vagina 2 (two) times a week Do not start before 2024.    ezetimibe (ZETIA) 10 mg tablet Take 1 tablet (10 mg total) by mouth daily     Allergies    Allergen Reactions    Gabapentin Anxiety and Delirium    Midazolam Nausea Only and Vomiting    Nickel Dermatitis and Itching    Rosuvastatin Myalgia    Titanium Dermatitis, Itching and Other (See Comments)     Immunization History   Administered Date(s) Administered    COVID-19 PFIZER VACCINE 0.3 ML IM 12/21/2020, 01/10/2021, 12/09/2021     Objective     /71 (BP Location: Right arm, Patient Position: Sitting, Cuff Size: Standard)   Pulse 76   Temp 97.7 °F (36.5 °C) (Temporal)   Wt 68.9 kg (151 lb 12.8 oz)   SpO2 99%   BMI 26.06 kg/m²     Physical Exam  Vitals and nursing note reviewed.   Constitutional:       General: She is not in acute distress.     Appearance: She is well-developed.      Comments: Skin with good color turgor , well hydrated ,no distress noted     HENT:      Head: Normocephalic and atraumatic.   Eyes:      Conjunctiva/sclera: Conjunctivae normal.   Cardiovascular:      Rate and Rhythm: Normal rate and regular rhythm.      Heart sounds: Normal heart sounds. No murmur heard.  Pulmonary:      Effort: Pulmonary effort is normal. No respiratory distress.      Breath sounds: Normal breath sounds.   Abdominal:      Palpations: Abdomen is soft.      Tenderness: There is no abdominal tenderness.   Musculoskeletal:         General: No swelling.      Cervical back: Neck supple.   Skin:     General: Skin is warm and dry.      Capillary Refill: Capillary refill takes less than 2 seconds.   Neurological:      Mental Status: She is alert.      Comments: Non focal exam    Psychiatric:         Attention and Perception: Attention normal.         Mood and Affect: Mood normal.         Speech: Speech normal.         Behavior: Behavior normal.         Thought Content: Thought content normal.       Administrative Statements

## 2024-07-02 ENCOUNTER — PREP FOR PROCEDURE (OUTPATIENT)
Dept: GASTROENTEROLOGY | Facility: CLINIC | Age: 54
End: 2024-07-02

## 2024-07-02 DIAGNOSIS — K21.9 GASTROESOPHAGEAL REFLUX DISEASE, UNSPECIFIED WHETHER ESOPHAGITIS PRESENT: Primary | ICD-10-CM

## 2024-07-02 DIAGNOSIS — B00.9 HERPES: Primary | ICD-10-CM

## 2024-07-02 RX ORDER — ACYCLOVIR 400 MG/1
400 TABLET ORAL 2 TIMES DAILY
Qty: 60 TABLET | Refills: 3 | Status: SHIPPED | OUTPATIENT
Start: 2024-07-02 | End: 2024-10-30

## 2024-07-05 ENCOUNTER — ANESTHESIA EVENT (OUTPATIENT)
Dept: GASTROENTEROLOGY | Facility: HOSPITAL | Age: 54
End: 2024-07-05

## 2024-07-05 ENCOUNTER — HOSPITAL ENCOUNTER (OUTPATIENT)
Dept: GASTROENTEROLOGY | Facility: HOSPITAL | Age: 54
Setting detail: OUTPATIENT SURGERY
Discharge: HOME | End: 2024-07-05
Attending: INTERNAL MEDICINE
Payer: COMMERCIAL

## 2024-07-05 ENCOUNTER — ANESTHESIA (OUTPATIENT)
Dept: GASTROENTEROLOGY | Facility: HOSPITAL | Age: 54
End: 2024-07-05

## 2024-07-05 VITALS
DIASTOLIC BLOOD PRESSURE: 68 MMHG | BODY MASS INDEX: 27.49 KG/M2 | HEIGHT: 64 IN | WEIGHT: 161 LBS | OXYGEN SATURATION: 96 % | RESPIRATION RATE: 20 BRPM | TEMPERATURE: 96.8 F | HEART RATE: 78 BPM | SYSTOLIC BLOOD PRESSURE: 116 MMHG

## 2024-07-05 DIAGNOSIS — K21.9 GASTROESOPHAGEAL REFLUX DISEASE, UNSPECIFIED WHETHER ESOPHAGITIS PRESENT: ICD-10-CM

## 2024-07-05 PROBLEM — R11.0 NAUSEA: Status: ACTIVE | Noted: 2024-07-05

## 2024-07-05 PROCEDURE — 88305 TISSUE EXAM BY PATHOLOGIST: CPT | Performed by: PATHOLOGY

## 2024-07-05 PROCEDURE — 43239 EGD BIOPSY SINGLE/MULTIPLE: CPT | Performed by: INTERNAL MEDICINE

## 2024-07-05 RX ORDER — SODIUM CHLORIDE 9 MG/ML
INJECTION, SOLUTION INTRAVENOUS CONTINUOUS PRN
Status: DISCONTINUED | OUTPATIENT
Start: 2024-07-05 | End: 2024-07-05

## 2024-07-05 RX ORDER — PROPOFOL 10 MG/ML
INJECTION, EMULSION INTRAVENOUS AS NEEDED
Status: DISCONTINUED | OUTPATIENT
Start: 2024-07-05 | End: 2024-07-05

## 2024-07-05 RX ORDER — FENTANYL CITRATE 50 UG/ML
INJECTION, SOLUTION INTRAMUSCULAR; INTRAVENOUS AS NEEDED
Status: DISCONTINUED | OUTPATIENT
Start: 2024-07-05 | End: 2024-07-05

## 2024-07-05 RX ORDER — LIDOCAINE HYDROCHLORIDE 20 MG/ML
INJECTION, SOLUTION EPIDURAL; INFILTRATION; INTRACAUDAL; PERINEURAL AS NEEDED
Status: DISCONTINUED | OUTPATIENT
Start: 2024-07-05 | End: 2024-07-05

## 2024-07-05 RX ADMIN — SODIUM CHLORIDE: 0.9 INJECTION, SOLUTION INTRAVENOUS at 11:14

## 2024-07-05 RX ADMIN — LIDOCAINE HYDROCHLORIDE 5 ML: 20 INJECTION, SOLUTION EPIDURAL; INFILTRATION; INTRACAUDAL at 11:29

## 2024-07-05 RX ADMIN — PROPOFOL 40 MG: 10 INJECTION, EMULSION INTRAVENOUS at 11:32

## 2024-07-05 RX ADMIN — PROPOFOL 100 MG: 10 INJECTION, EMULSION INTRAVENOUS at 11:29

## 2024-07-05 RX ADMIN — FENTANYL CITRATE 50 MCG: 50 INJECTION INTRAMUSCULAR; INTRAVENOUS at 11:29

## 2024-07-05 NOTE — ASSESSMENT & PLAN NOTE
Pt is following with gyn , she has multi symptoms , labs have been nl , she is using estrace 2 x per week will continue

## 2024-07-05 NOTE — ASSESSMENT & PLAN NOTE
Chronic for patient , multifactorial she sleeps fairly well but wakes feeling un rested , she has been more diligent with using Peloton 5 x per week , she is a vegan and eats healthily she does drink enough water , labs have been nl , consider sleep study

## 2024-07-05 NOTE — ANESTHESIA POSTPROCEDURE EVALUATION
Post-Op Assessment Note    CV Status:  Stable  Pain Score: 0    Pain management: adequate       Mental Status:  Alert and awake   Hydration Status:  Stable and euvolemic   PONV Controlled:  None   Airway Patency:  Patent     Post Op Vitals Reviewed: Yes    No anethesia notable event occurred.    Staff: CRNA               BP   107/61   Temp      Pulse 74   Resp 18   SpO2 99

## 2024-07-05 NOTE — ANESTHESIA PREPROCEDURE EVALUATION
Procedure:  EGD    Relevant Problems   ANESTHESIA (within normal limits)      CARDIO   (+) Other hyperlipidemia      NEURO/PSYCH   (+) Depressive disorder      PULMONARY   (+) SOB (shortness of breath)      Ear/Nose/Throat   (+) Sensorineural hearing loss (SNHL) of both ears      Orthopedic/Musculoskeletal   (+) History of total right knee replacement        Physical Exam    Airway    Mallampati score: II  TM Distance: >3 FB  Neck ROM: full     Dental   No notable dental hx     Cardiovascular      Pulmonary      Other Findings  post-pubertal.      Anesthesia Plan  ASA Score- 2     Anesthesia Type- IV sedation with anesthesia with ASA Monitors.         Additional Monitors:     Airway Plan:            Plan Factors-Exercise tolerance (METS): >4 METS.    Chart reviewed. EKG reviewed.  Existing labs reviewed. Patient summary reviewed.    Patient is not a current smoker.              Induction- intravenous.    Postoperative Plan-         Informed Consent- Anesthetic plan and risks discussed with patient.  I personally reviewed this patient with the CRNA. Discussed and agreed on the Anesthesia Plan with the CRNA..

## 2024-07-05 NOTE — ASSESSMENT & PLAN NOTE
Pt saw GI they wanted her to take omeprazole x 8 weeks , didn't feel any better , has EGD scheduled

## 2024-07-05 NOTE — H&P
History and Physical -  [facility]  Lucy Rodriguez 53 y.o. female MRN: [id number]                  HPI: Lucy Rodriguez is a 53 y.o. year old female who presents for dyspepsia      REVIEW OF SYSTEMS: Per the HPI, and otherwise unremarkable.    Historical Information   Past Medical History:   Diagnosis Date    Cancer (HCC)     BCC    Depression 2011    Genital warts     Hearing impaired     Herpes     Miscarriage     Ovarian cyst      Past Surgical History:   Procedure Laterality Date    CONDYLOMA EXCISION/FULGURATION      DILATION AND CURETTAGE OF UTERUS      JOINT REPLACEMENT Right     knee     Social History   Social History     Substance and Sexual Activity   Alcohol Use Yes    Alcohol/week: 19.0 standard drinks of alcohol    Types: 5 Glasses of wine, 4 Shots of liquor, 10 Standard drinks or equivalent per week    Comment: social      Social History     Substance and Sexual Activity   Drug Use Not Currently    Types: Marijuana     Social History     Tobacco Use   Smoking Status Former    Current packs/day: 0.00    Average packs/day: 0.5 packs/day for 12.9 years (6.5 ttl pk-yrs)    Types: Cigarettes    Start date: 1986    Quit date: 1998    Years since quittin.5   Smokeless Tobacco Never     Family History   Problem Relation Age of Onset    Heart disease Mother         Aortic stenosis    Deep vein thrombosis Mother     Osteoarthritis Mother     Thyroid disease Mother     Hypertension Father     Hyperlipidemia Father     Heart disease Father     Seizures Father     Transient ischemic attack Father     Heart failure Maternal Grandmother     Heart attack Maternal Grandmother         age 70 CABG    Heart disease Maternal Grandmother         age 78     Heart attack Paternal Grandfather         age 44     Heart failure Cousin     Heart attack Maternal Grandfather         age 66     Heart disease Maternal Grandfather     Hyperlipidemia Sister   "      Meds/Allergies       Current Outpatient Medications:     acetaminophen (TYLENOL) 500 mg tablet    acyclovir (ZOVIRAX) 400 MG tablet    Biotin 10 MG TABS    ezetimibe (ZETIA) 10 mg tablet    amoxicillin (AMOXIL) 500 mg capsule    estradiol (ESTRACE) 0.1 mg/g vaginal cream    venlafaxine (EFFEXOR-XR) 37.5 mg 24 hr capsule    Allergies   Allergen Reactions    Gabapentin Anxiety and Delirium    Midazolam Nausea Only and Vomiting    Nickel Dermatitis and Itching    Rosuvastatin Myalgia    Titanium Dermatitis, Itching and Other (See Comments)       Objective     /70   Pulse 67   Temp (!) 97.3 °F (36.3 °C) (Tympanic)   Resp 16   Ht 5' 4\" (1.626 m)   Wt 73 kg (161 lb)   SpO2 98%   BMI 27.64 kg/m²       PHYSICAL EXAM    Gen: NAD  Head: NCAT  CV: RRR  CHEST: Clear  ABD: soft, NT/ND  EXT: no edema      ASSESSMENT/PLAN:  This is a 53 y.o. year old female here for egd, and she is stable and optimized for her procedure.        "

## 2024-07-05 NOTE — ASSESSMENT & PLAN NOTE
Pt has been having depression x years , she has tried different meds , prozac , lexapro , zoloft in the past either didn't help or caused side effects . She has been doing very well with healthy diet , hydration and regular exercise x a few months . She had visited with Reiki  master last week that session went well , she will have f/u visit   Rec start low dose effexor , she will take prozac q other day x 7-10 days then d/c   She will be looking into starting Yoga sessions at least once per week , she is also considering acupuncture , given Keshav Chauhan information f/u here 4 weeks

## 2024-07-09 DIAGNOSIS — R68.89 FORGETFULNESS: Primary | ICD-10-CM

## 2024-07-10 ENCOUNTER — TELEPHONE (OUTPATIENT)
Dept: GASTROENTEROLOGY | Facility: CLINIC | Age: 54
End: 2024-07-10

## 2024-07-15 ENCOUNTER — APPOINTMENT (OUTPATIENT)
Dept: LAB | Facility: AMBULARY SURGERY CENTER | Age: 54
End: 2024-07-15
Payer: COMMERCIAL

## 2024-07-15 DIAGNOSIS — E56.9 VITAMIN DEFICIENCY: ICD-10-CM

## 2024-07-15 LAB
FOLATE SERPL-MCNC: 10.8 NG/ML
VIT B12 SERPL-MCNC: 407 PG/ML (ref 180–914)

## 2024-07-15 PROCEDURE — 36415 COLL VENOUS BLD VENIPUNCTURE: CPT

## 2024-07-15 PROCEDURE — 82746 ASSAY OF FOLIC ACID SERUM: CPT

## 2024-07-15 PROCEDURE — 82607 VITAMIN B-12: CPT

## 2024-07-15 PROCEDURE — 84207 ASSAY OF VITAMIN B-6: CPT

## 2024-07-20 LAB — VIT B6 SERPL-MCNC: 24.6 UG/L (ref 3.4–65.2)

## 2024-07-31 ENCOUNTER — APPOINTMENT (OUTPATIENT)
Dept: LAB | Facility: MEDICAL CENTER | Age: 54
End: 2024-07-31
Payer: COMMERCIAL

## 2024-07-31 ENCOUNTER — OFFICE VISIT (OUTPATIENT)
Dept: GYNECOLOGY | Facility: CLINIC | Age: 54
End: 2024-07-31
Payer: COMMERCIAL

## 2024-07-31 DIAGNOSIS — E78.41 ELEVATED LP(A): ICD-10-CM

## 2024-07-31 DIAGNOSIS — E78.5 DYSLIPIDEMIA: ICD-10-CM

## 2024-07-31 DIAGNOSIS — Z78.0 MENOPAUSE: Primary | ICD-10-CM

## 2024-07-31 DIAGNOSIS — Z78.0 MENOPAUSE: ICD-10-CM

## 2024-07-31 LAB
CHOLEST SERPL-MCNC: 191 MG/DL
ESTRADIOL SERPL-MCNC: <15 PG/ML
FSH SERPL-ACNC: 111.8 MIU/ML
HDLC SERPL-MCNC: 79 MG/DL
LDLC SERPL CALC-MCNC: 99 MG/DL (ref 0–100)
LH SERPL-ACNC: 40.9 MIU/ML
TRIGL SERPL-MCNC: 64 MG/DL

## 2024-07-31 PROCEDURE — 99213 OFFICE O/P EST LOW 20 MIN: CPT | Performed by: OBSTETRICS & GYNECOLOGY

## 2024-07-31 PROCEDURE — 83002 ASSAY OF GONADOTROPIN (LH): CPT

## 2024-07-31 PROCEDURE — 83001 ASSAY OF GONADOTROPIN (FSH): CPT

## 2024-07-31 PROCEDURE — 36415 COLL VENOUS BLD VENIPUNCTURE: CPT

## 2024-07-31 PROCEDURE — 80061 LIPID PANEL: CPT

## 2024-07-31 PROCEDURE — 82670 ASSAY OF TOTAL ESTRADIOL: CPT

## 2024-07-31 RX ORDER — ESTRADIOL 0.05 MG/D
1 PATCH, EXTENDED RELEASE TRANSDERMAL 2 TIMES WEEKLY
Qty: 24 PATCH | Refills: 0 | Status: SHIPPED | OUTPATIENT
Start: 2024-08-01

## 2024-07-31 RX ORDER — PROGESTERONE 100 MG/1
100 CAPSULE ORAL
Qty: 90 CAPSULE | Refills: 0 | Status: SHIPPED | OUTPATIENT
Start: 2024-07-31

## 2024-07-31 NOTE — PROGRESS NOTES
"Assessment/Plan:    Menopause/perimenopause discussed at length. Order placed to recheck FSH/LH/estradiol before starting meds.   Will trial vivelle dot 2x per week and oral progesterone at night. WHI/risks of blood clot, stroke, MI, breast cancer reviewed. Importance of taking progesterone every night to prevent uterine cancer reviewed.   Pt will schedule appt with neuropsych.  Will call in 2 weeks to discuss improvement in sx.   If pt remains on HRT, she will need an appt in 3 months for med check.     I have spent a total time of 26 minutes in caring for this patient on the day of the visit/encounter including Diagnostic results, Prognosis, Risks and benefits of tx options, Instructions for management, Patient and family education, Importance of tx compliance, Risk factor reductions, Impressions, Counseling / Coordination of care, Documenting in the medical record, and Obtaining or reviewing history  .      Diagnoses and all orders for this visit:    Menopause  -     Follicle stimulating hormone; Future  -     Luteinizing hormone; Future  -     Estradiol; Future  -     estradiol (Vivelle-Dot) 0.05 MG/24HR; Place 1 patch on the skin 2 (two) times a week  -     Progesterone 100 MG CAPS; Take 100 mg by mouth daily at bedtime        Subjective:      Patient ID: Lucy Rodriguez is a 53 y.o. female.    Pt presents for menopause consult.   On last check of labs in 4/2024, she still had ovarian function. Pt has gone about 10 months without menses.   Pt states \"I'm desperate.\" Most bothersome sx is her emotional fluctuations. Manages long psychiatric hx with PCP. She is on Effexor and does not feel it is helping. She is not seen by a therapist, although she states she has had a few in the past. She denies any plans to harm herself or others, but no longer wants to participate in activities that she once enjoyed, stating \"I just want to stay home and take care of my family.\" She was referred to neuropsych last week. We " looked up a provider today for her to call and get that appt scheduled.   Pt is also experiencing night sweats every night that are interrupting sleep.   Pt is also experiencing joint aches, brain fog, difficulty with weight loss.        The following portions of the patient's history were reviewed and updated as appropriate: allergies, current medications, past family history, past medical history, past social history, past surgical history and problem list.    Review of Systems      Objective:      There were no vitals taken for this visit.         Physical Exam  Vitals and nursing note reviewed.   Constitutional:       Appearance: Normal appearance.   HENT:      Head: Normocephalic and atraumatic.   Pulmonary:      Effort: Pulmonary effort is normal.   Musculoskeletal:         General: Normal range of motion.      Cervical back: Normal range of motion.   Skin:     General: Skin is warm and dry.   Neurological:      Mental Status: She is alert and oriented to person, place, and time.   Psychiatric:         Mood and Affect: Mood normal.         Behavior: Behavior normal.         Thought Content: Thought content normal.         Judgment: Judgment normal.

## 2024-08-01 DIAGNOSIS — E78.41 ELEVATED LP(A): ICD-10-CM

## 2024-08-01 DIAGNOSIS — E78.5 DYSLIPIDEMIA: Primary | ICD-10-CM

## 2024-08-01 RX ORDER — ROSUVASTATIN CALCIUM 5 MG/1
2.5 TABLET, COATED ORAL DAILY
Qty: 45 TABLET | Refills: 3 | Status: SHIPPED | OUTPATIENT
Start: 2024-08-01

## 2024-08-01 NOTE — PROGRESS NOTES
Compliance with ezetimibe, numbers have gone up, she is also now on Effexor which can also raise cholesterol levels.  Weight has been more or less stable.  Discussed with her on the phone, will try rosuvastatin 2.5 mg daily which she had tolerated well before but on the higher dose steroid myalgias.    Recheck in 3 months

## 2024-09-03 ENCOUNTER — OFFICE VISIT (OUTPATIENT)
Dept: INTERNAL MEDICINE CLINIC | Facility: CLINIC | Age: 54
End: 2024-09-03

## 2024-09-03 VITALS
WEIGHT: 155 LBS | SYSTOLIC BLOOD PRESSURE: 111 MMHG | HEIGHT: 64 IN | BODY MASS INDEX: 26.46 KG/M2 | DIASTOLIC BLOOD PRESSURE: 78 MMHG | HEART RATE: 97 BPM | TEMPERATURE: 97.7 F

## 2024-09-03 DIAGNOSIS — R42 DIZZINESS: Primary | ICD-10-CM

## 2024-09-03 DIAGNOSIS — N95.1 PERIMENOPAUSAL: ICD-10-CM

## 2024-09-03 DIAGNOSIS — R51.9 HEADACHE, UNSPECIFIED HEADACHE TYPE: ICD-10-CM

## 2024-09-03 DIAGNOSIS — F32.A DEPRESSIVE DISORDER: ICD-10-CM

## 2024-09-03 DIAGNOSIS — R41.3 MEMORY LOSS: ICD-10-CM

## 2024-09-03 DIAGNOSIS — Q04.8 CEREBELLAR TONSILLAR ECTOPIA (HCC): ICD-10-CM

## 2024-09-03 DIAGNOSIS — R26.89 IMBALANCE: ICD-10-CM

## 2024-09-03 DIAGNOSIS — H93.13 TINNITUS OF BOTH EARS: ICD-10-CM

## 2024-09-03 DIAGNOSIS — H90.3 SENSORINEURAL HEARING LOSS (SNHL) OF BOTH EARS: ICD-10-CM

## 2024-09-03 PROCEDURE — 99214 OFFICE O/P EST MOD 30 MIN: CPT | Performed by: FAMILY MEDICINE

## 2024-09-03 NOTE — ASSESSMENT & PLAN NOTE
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

## 2024-09-03 NOTE — ASSESSMENT & PLAN NOTE
First noted on brain MRI 1/2026 in work up to r/o MS , had C spine MRI which didn't meet criteria of Chiari malformation she had another study done in 2020 with current ongoing worsening symptoms recommend follow up study

## 2024-09-03 NOTE — ASSESSMENT & PLAN NOTE
Pt has had ongoing dizziness , tinnitus dating back a few years , she can have times where she feels poor balance where she steps so slowly as she feels she may fall . She is very faithful with hydration and eating regular meals , recent heightened sx as she tapered off effexor Recommend change position slowly carefully , avoid any offending positions With hx of abnormal brain MRI , recommend update study without contrast
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
## 2024-09-03 NOTE — ASSESSMENT & PLAN NOTE
Pt has been noting this x a few years , she has had nl B 12 and TSH levels she is eating a healthy diet , needs to get back to regular exercise , she has ongoing depression which could contribute , she has not been  able to tolerate multi medications , is looking into obtaining a new counselor , with her other neuro Sx , HA , imbalance agree with GI's recommendation of neuropsych evaluation

## 2024-09-03 NOTE — ASSESSMENT & PLAN NOTE
Pt has been following with gyn , she has been on HRT now x ~ 4 weeks she is sleeping some better more restfully but doesn't feel any better otherwise , she will continue vivell and estrace cream as directed she has follow up appointment this month

## 2024-09-10 DIAGNOSIS — F41.8 SITUATIONAL ANXIETY: Primary | ICD-10-CM

## 2024-09-10 RX ORDER — ALPRAZOLAM 0.25 MG
TABLET ORAL
Qty: 3 TABLET | Refills: 0 | Status: SHIPPED | OUTPATIENT
Start: 2024-09-10

## 2024-09-13 ENCOUNTER — HOSPITAL ENCOUNTER (OUTPATIENT)
Dept: RADIOLOGY | Facility: HOSPITAL | Age: 54
Discharge: HOME | End: 2024-09-13
Attending: FAMILY MEDICINE
Payer: COMMERCIAL

## 2024-09-13 DIAGNOSIS — R51.9 HEADACHE, UNSPECIFIED HEADACHE TYPE: ICD-10-CM

## 2024-09-13 DIAGNOSIS — R42 DIZZINESS: ICD-10-CM

## 2024-09-13 DIAGNOSIS — Q04.8 CEREBELLAR TONSILLAR ECTOPIA (HCC): ICD-10-CM

## 2024-09-13 DIAGNOSIS — H93.13 TINNITUS OF BOTH EARS: ICD-10-CM

## 2024-09-13 DIAGNOSIS — R26.89 IMBALANCE: ICD-10-CM

## 2024-09-13 PROCEDURE — 70551 MRI BRAIN STEM W/O DYE: CPT

## 2024-09-16 DIAGNOSIS — R42 DIZZINESS: ICD-10-CM

## 2024-09-16 DIAGNOSIS — R26.89 IMBALANCE: ICD-10-CM

## 2024-09-16 DIAGNOSIS — R90.89 ABNORMAL BRAIN MRI: Primary | ICD-10-CM

## 2024-09-16 DIAGNOSIS — Q04.8 CEREBELLAR TONSILLAR ECTOPIA (HCC): ICD-10-CM

## 2024-09-16 DIAGNOSIS — Z78.0 POST-MENOPAUSAL: ICD-10-CM

## 2024-09-17 ENCOUNTER — HOSPITAL ENCOUNTER (OUTPATIENT)
Facility: MEDICAL CENTER | Age: 54
Discharge: HOME | End: 2024-09-17
Payer: COMMERCIAL

## 2024-09-17 DIAGNOSIS — R42 DIZZINESS: ICD-10-CM

## 2024-09-17 DIAGNOSIS — R90.89 ABNORMAL BRAIN MRI: ICD-10-CM

## 2024-09-17 DIAGNOSIS — R26.89 IMBALANCE: ICD-10-CM

## 2024-09-17 DIAGNOSIS — Z78.0 POST-MENOPAUSAL: ICD-10-CM

## 2024-09-17 DIAGNOSIS — Q04.8 CEREBELLAR TONSILLAR ECTOPIA (HCC): ICD-10-CM

## 2024-09-17 PROCEDURE — 70553 MRI BRAIN STEM W/O & W/DYE: CPT

## 2024-09-17 PROCEDURE — A9585 GADOBUTROL INJECTION: HCPCS | Performed by: FAMILY MEDICINE

## 2024-09-17 RX ORDER — GADOBUTROL 604.72 MG/ML
7 INJECTION INTRAVENOUS
Status: COMPLETED | OUTPATIENT
Start: 2024-09-17 | End: 2024-09-17

## 2024-09-17 RX ADMIN — GADOBUTROL 7 ML: 604.72 INJECTION INTRAVENOUS at 15:08

## 2024-09-23 DIAGNOSIS — F32.A DEPRESSIVE DISORDER: ICD-10-CM

## 2024-09-23 DIAGNOSIS — R53.83 FATIGUE, UNSPECIFIED TYPE: ICD-10-CM

## 2024-09-23 DIAGNOSIS — R41.3 MEMORY LOSS: Primary | ICD-10-CM

## 2024-09-23 DIAGNOSIS — R90.89 ABNORMAL BRAIN MRI: ICD-10-CM

## 2024-09-23 DIAGNOSIS — H93.13 TINNITUS OF BOTH EARS: ICD-10-CM

## 2024-09-24 DIAGNOSIS — R93.1 ELEVATED CORONARY ARTERY CALCIUM SCORE: ICD-10-CM

## 2024-09-24 DIAGNOSIS — E78.41 ELEVATED LP(A): ICD-10-CM

## 2024-09-24 DIAGNOSIS — E78.5 DYSLIPIDEMIA: ICD-10-CM

## 2024-09-25 RX ORDER — EZETIMIBE 10 MG/1
10 TABLET ORAL DAILY
Qty: 90 TABLET | Refills: 0 | Status: SHIPPED | OUTPATIENT
Start: 2024-09-25

## 2024-09-25 NOTE — PROGRESS NOTES
Unable to tolerate rosuvastatin 5 mg every other day, will continue Zetia for now, discuss at upcoming appointment

## 2024-10-01 ENCOUNTER — OFFICE VISIT (OUTPATIENT)
Dept: AUDIOLOGY | Age: 54
End: 2024-10-01
Payer: COMMERCIAL

## 2024-10-01 DIAGNOSIS — R42 DIZZINESS: Primary | ICD-10-CM

## 2024-10-01 PROCEDURE — 92567 TYMPANOMETRY: CPT

## 2024-10-01 PROCEDURE — 92540 BASIC VESTIBULAR EVALUATION: CPT

## 2024-10-01 PROCEDURE — 92537 CALORIC VSTBLR TEST W/REC: CPT

## 2024-10-01 NOTE — PROGRESS NOTES
Videonystagmography (VNG) Evaluation    Name:  Lucy oRdriguez  :  1970  Age:  53 y.o.  MRN:  [id number]  Date of Evaluation: 10/01/24     HISTORY:     Reason for visit: Dizziness    Lucy Rodriguez is seen today at the request of Dr. Archibald for VNG testing. Lcuy was unaccompanied to today's visit. Patient reports lightheadedness, faintness, loss of balance, headaches, nausea, an increase in tinnitus volume, and difficulty speaking. Her symptoms last seconds and are made worse by moving, fatigue, and exertion. She does have an asymmetric hearing loss.    EVALUATION:    Otoscopic Evaluation:   Right Ear: Unremarkable, canal clear   Left Ear: Unremarkable, canal clear    Tympanometry:   Right: Type A; normal middle ear pressure and static compliance    Left: Type A; normal middle ear pressure and static compliance     Oculomotor battery:   Gaze:   Right: Within normal limits  Left: Within normal limits  Up: Within normal limits  Down: Within normal limits      Tracking: Within normal limits    Saccades: Within normal limits     Optokinetic:  Abnormal gain 40 dps    Positioning/Positionals:     [place]rmelinda [place]:    Right:  Negative    Left: Negative      Positionals:   Sitting: Normal  Supine:  Normal  Head Right: 3 LB  Head Left:  Normal    Calorics: (Normal response <25% difference)    Bithermal Caloric Irrigation: Within normal limits.     Caloric irrigations  completed without incident with good parting otoscopy noted.       IMPRESSIONS:     Within normal limits: No evidence of peripheral or central vestibular pathology indicated by today's findings.      Borderline right unilateral weakness    RECOMMENDATIONS:     1) Follow-up with referring provider to review today's results.  2) Continue to monitor dizziness symptoms. If symptoms worsen or fail to improve prior to follow-up with their referring provider, contact your primary care/or referring provider and/or urgent medical attention should be  considered.  3) Fall precautions were discussed at length with the patient. Most test effects are expected to subside shortly after testing is completed, it was recommended that they use caution moving around for the remainder of the day.   4) Consider vestibular physical therapy evaluation and rehabilitation through [facility]      Alba [place], [facility]  Clinical Audiologist  [facility]  [address] [place] [id number]

## 2024-10-15 ENCOUNTER — TELEPHONE (OUTPATIENT)
Dept: INTERNAL MEDICINE CLINIC | Facility: CLINIC | Age: 54
End: 2024-10-15

## 2024-10-15 NOTE — LETTER
October 15, 2024     Patient: Lucy Rodriguez  YOB: 1970  Date of Visit: 10/15/2024      To Whom it May Concern:    Lucy Rodriguez is under my professional care. Lucy was seen in my office on 10/15/2024. Lucy {Return to school/sport/work:[id number]}.    If you have any questions or concerns, please don't hesitate to call.         Sincerely,          Sierra David Kunz MD        CC: No Recipients

## 2024-10-15 NOTE — LETTER
[facility]  [address]  [place] [address]  Phone#  [phone]  Fax#  [phone]    Lucy Rodriguez  [address] [place] [address]    October 15, 2024      Dear:   Lucy Rodriguez         Our office has attempted to contact you regarding your  appointment .  Could you please contact our office at [phone].    Thank you.     Sincerely,    [facility]

## 2024-10-17 ENCOUNTER — OFFICE VISIT (OUTPATIENT)
Dept: INTERNAL MEDICINE CLINIC | Facility: CLINIC | Age: 54
End: 2024-10-17

## 2024-10-17 VITALS
TEMPERATURE: 97.2 F | SYSTOLIC BLOOD PRESSURE: 101 MMHG | DIASTOLIC BLOOD PRESSURE: 67 MMHG | WEIGHT: 152 LBS | BODY MASS INDEX: 25.95 KG/M2 | HEIGHT: 64 IN | HEART RATE: 93 BPM

## 2024-10-17 DIAGNOSIS — M25.50 ARTHRALGIA, UNSPECIFIED JOINT: ICD-10-CM

## 2024-10-17 DIAGNOSIS — M25.511 RIGHT SHOULDER PAIN, UNSPECIFIED CHRONICITY: Primary | ICD-10-CM

## 2024-10-17 DIAGNOSIS — M54.2 CERVICALGIA: ICD-10-CM

## 2024-10-17 DIAGNOSIS — Z78.0 POST-MENOPAUSAL: ICD-10-CM

## 2024-10-17 DIAGNOSIS — F32.A DEPRESSIVE DISORDER: ICD-10-CM

## 2024-10-17 DIAGNOSIS — R51.9 HEADACHE, UNSPECIFIED HEADACHE TYPE: ICD-10-CM

## 2024-10-17 DIAGNOSIS — Q04.8 CEREBELLAR TONSILLAR ECTOPIA (HCC): ICD-10-CM

## 2024-10-17 PROCEDURE — 99214 OFFICE O/P EST MOD 30 MIN: CPT | Performed by: FAMILY MEDICINE

## 2024-10-17 NOTE — ASSESSMENT & PLAN NOTE
Patient has been off medication for ~ weeks she had  weaned very slowly off low dose effexor and had a really rough time , tired , HA , severe dizziness   She has embarked upon making effort to exercise and lose weight , drinking significantly less alcohol , has been doing Weight Watcher's diet and being more regular with exercise more walking , regular [employer] use , she lost 5 lbs in the first week , she is having bouts of angering easily but she is able to move on and get through these periods She has been sleeping better , will continue same path

## 2024-10-17 NOTE — ASSESSMENT & PLAN NOTE
Brain MRI with contrast showed R cerebellar ectopia no evidence of Chiari malformation or intracranial pressure disorders

## 2024-10-17 NOTE — ASSESSMENT & PLAN NOTE
Pt has been  gradually feeling better off effexor , she is engaging in healthy diet , exercise and has resumed hormone patch

## 2024-10-17 NOTE — ASSESSMENT & PLAN NOTE
Orders:    Sedimentation rate, automated; Future    Lyme Total AB W Reflex to IGM/IGG; Future    CHARLOTTE Screen w/ Reflex to Titer/Pattern; Future    RF Screen w/ Reflex to Titer; Future

## 2024-10-17 NOTE — PROGRESS NOTES
Ambulatory Visit  Name: Lucy Rodriguez      : 1970      MRN: [id number]  Encounter Provider: Sierra Kunz MD  Encounter Date: 10/17/2024   [facility]    Assessment & Plan  Headache, unspecified headache type    Orders:    Sedimentation rate, automated; Future    Lyme Total AB W Reflex to IGM/IGG; Future    CHARLOTTE Screen w/ Reflex to Titer/Pattern; Future    RF Screen w/ Reflex to Titer; Future    Cervicalgia  See shoulder pain , update labs   Orders:    C-reactive protein; Future    Sedimentation rate, automated; Future    Lyme Total AB W Reflex to IGM/IGG; Future    CHARLOTTE Screen w/ Reflex to Titer/Pattern; Future    RF Screen w/ Reflex to Titer; Future    XR spine cervical complete 4 or 5 vw non injury; Future    Cerebellar tonsillar ectopia (HCC)  Brain MRI with contrast showed R cerebellar ectopia no evidence of Chiari malformation or intracranial pressure disorders        Depressive disorder  Patient has been off medication for ~ weeks she had  weaned very slowly off low dose effexor and had a really rough time , tired , HA , severe dizziness   She has embarked upon making effort to exercise and lose weight , drinking significantly less alcohol , has been doing Weight Watcher's diet and being more regular with exercise more walking , regular Peloton use , she lost 5 lbs in the first week , she is having bouts of angering easily but she is able to move on and get through these periods She has been sleeping better , will continue same path          Arthralgia, unspecified joint    Orders:    C-reactive protein; Future    Sedimentation rate, automated; Future    Lyme Total AB W Reflex to IGM/IGG; Future    CHARLOTTE Screen w/ Reflex to Titer/Pattern; Future    RF Screen w/ Reflex to Titer; Future    Right shoulder pain, unspecified chronicity  Pt has been having more pain R shoulder posteriorly , R neck she has cats and rabbits , lifts 40 lb bags of food  , changing multi litter boxes , no injury   Recommend avoid offending positions actions as best able , moist heat applications , she has robaxin at home may take 500 mg po in the evening , agree with continuing massage therapy sessions Wei Shoemaker   Check xray shoulder and C spine for completeness   Orders:    XR shoulder 2+ vw right; Future    Post-menopausal  Pt has been  gradually feeling better off effexor , she is engaging in healthy diet , exercise and has resumed hormone patch       BMI Counseling: Body mass index is 26.09 kg/m². The BMI is above normal. Nutrition recommendations include decreasing portion sizes, encouraging healthy choices of fruits and vegetables, decreasing fast food intake, consuming healthier snacks, limiting drinks that contain sugar and reducing intake of saturated and trans fat. Exercise recommendations include exercising 3-5 times per week. Rationale for BMI follow-up plan is due to patient being overweight or obese.         History of Present Illness     HPI Pt here for follow up , review Brain MRI , contrast study showed R cerebellar ectopia no Chiari 1 malformation , patient has been less bothered by dizziness over the last few weeks ? Menopause she has started Weight Watchers x 2 weeks she lost 5 lbs the first week , cutting back on drinking , she is walking daily , Peloton 3-4 x per week   Her head is feeling some better she resumed hormone patches x 2 weeks , she still can have mood changes angers easily , not having the desperate feelings , has had neck pain R shoulder pain stiffness, worsening HA over the last few weeks ,  has been to massage x 2 no injury but carries 40 lb bags of rabbit food , empties multi litter boxes her cats and rabbits   Review of Systems   Constitutional:  Negative for chills and fever.   HENT:  Negative for ear pain and sore throat.    Eyes:  Negative for pain and visual disturbance.   Respiratory:  Negative for cough and shortness of breath.     Cardiovascular:  Negative for chest pain and palpitations.   Gastrointestinal:  Negative for abdominal pain and vomiting.   Genitourinary:  Negative for dysuria and hematuria.   Musculoskeletal:  Negative for arthralgias and back pain.   Skin:  Negative for color change and rash.   Neurological:  Negative for seizures and syncope.   All other systems reviewed and are negative.    Past Medical History:   Diagnosis Date    Cancer (HCC)     BCC    Depression 2011    Genital warts     Hearing impaired     Herpes     Miscarriage     Ovarian cyst      Past Surgical History:   Procedure Laterality Date    CONDYLOMA EXCISION/FULGURATION      DILATION AND CURETTAGE OF UTERUS      JOINT REPLACEMENT Right     knee     Family History   Problem Relation Age of Onset    Heart disease Mother         Aortic stenosis    Deep vein thrombosis Mother     Osteoarthritis Mother     Thyroid disease Mother     Hypertension Father     Hyperlipidemia Father     Heart disease Father     Seizures Father     Transient ischemic attack Father     Heart failure Maternal Grandmother     Heart attack Maternal Grandmother         age 70 CABG    Heart disease Maternal Grandmother         age 78     Heart attack Paternal Grandfather         age 44     Heart failure Cousin     Heart attack Maternal Grandfather         age 66     Heart disease Maternal Grandfather     Hyperlipidemia Sister      Social History     Tobacco Use    Smoking status: Former     Current packs/day: 0.00     Average packs/day: 0.5 packs/day for 12.9 years (6.5 ttl pk-yrs)     Types: Cigarettes     Start date: 1986     Quit date: 1998     Years since quittin.8    Smokeless tobacco: Never   Vaping Use    Vaping status: Never Used   Substance and Sexual Activity    Alcohol use: Yes     Alcohol/week: 19.0 standard drinks of alcohol     Types: 5 Glasses of wine, 4 Shots of liquor, 10 Standard drinks or equivalent per week     Comment:  "social     Drug use: Not Currently     Types: Marijuana    Sexual activity: Not Currently     Partners: Male     Birth control/protection: Male Sterilization     Comment: Too painful     Current Outpatient Medications on File Prior to Visit   Medication Sig    acetaminophen (TYLENOL) 500 mg tablet Take 1,000 mg by mouth every 6 (six) hours as needed    acyclovir (ZOVIRAX) 400 MG tablet Take 1 tablet (400 mg total) by mouth 2 (two) times a day 1 po bid as directed    ALPRAZolam (XANAX) 0.25 mg tablet 1 po pre procedure may repeat if needed    amoxicillin (AMOXIL) 500 mg capsule if needed    Biotin 10 MG TABS Take by mouth daily    estradiol (ESTRACE) 0.1 mg/g vaginal cream Insert 0.5 g into the vagina 2 (two) times a week Do not start before April 18, 2024.    estradiol (Vivelle-Dot) 0.05 MG/24HR Place 1 patch on the skin 2 (two) times a week    ezetimibe (ZETIA) 10 mg tablet Take 1 tablet (10 mg total) by mouth daily    Progesterone 100 MG CAPS Take 100 mg by mouth daily at bedtime     Allergies   Allergen Reactions    Gabapentin Anxiety and Delirium    Midazolam Nausea Only and Vomiting    Nickel Dermatitis and Itching    Rosuvastatin Myalgia    Titanium Dermatitis, Itching and Other (See Comments)     Immunization History   Administered Date(s) Administered    COVID-19 PFIZER VACCINE 0.3 ML IM 12/21/2020, 01/10/2021, 12/09/2021     Objective     /67 (BP Location: Right arm, Patient Position: Sitting, Cuff Size: Large)   Pulse 93   Temp (!) 97.2 °F (36.2 °C) (Temporal)   Ht 5' 4\" (1.626 m)   Wt 68.9 kg (152 lb)   BMI 26.09 kg/m²     Physical Exam  Vitals and nursing note reviewed.   Constitutional:       General: She is not in acute distress.     Appearance: She is well-developed.      Comments: Skin with good color turgor , well hydrated ,no distress noted     HENT:      Head: Normocephalic and atraumatic.      Right Ear: Decreased hearing noted.      Left Ear: Decreased hearing noted.   Eyes:      " Conjunctiva/sclera: Conjunctivae normal.   Neck:      Thyroid: No thyromegaly.   Cardiovascular:      Rate and Rhythm: Normal rate and regular rhythm.      Heart sounds: No murmur heard.  Pulmonary:      Effort: Pulmonary effort is normal. No respiratory distress.      Breath sounds: Normal breath sounds.   Abdominal:      Palpations: Abdomen is soft.      Tenderness: There is no abdominal tenderness.   Musculoskeletal:         General: No swelling.      Right shoulder: Tenderness present. No crepitus. Normal range of motion.      Cervical back: Neck supple. Spasms and tenderness present. No bony tenderness. Decreased range of motion.      Comments: R shoulder TTP post shoulder , R SA , R AC    Lymphadenopathy:      Cervical:      Right cervical: No superficial cervical adenopathy.     Left cervical: No superficial cervical adenopathy.   Skin:     General: Skin is warm and dry.      Capillary Refill: Capillary refill takes less than 2 seconds.   Neurological:      Mental Status: She is alert.   Psychiatric:         Mood and Affect: Mood normal.

## 2024-10-17 NOTE — ASSESSMENT & PLAN NOTE
See shoulder pain , update labs   Orders:    C-reactive protein; Future    Sedimentation rate, automated; Future    Lyme Total AB W Reflex to IGM/IGG; Future    CHARLOTTE Screen w/ Reflex to Titer/Pattern; Future    RF Screen w/ Reflex to Titer; Future    XR spine cervical complete 4 or 5 vw non injury; Future

## 2024-10-18 ENCOUNTER — APPOINTMENT (OUTPATIENT)
Dept: LAB | Facility: CLINIC | Age: 54
End: 2024-10-18
Payer: COMMERCIAL

## 2024-10-18 ENCOUNTER — APPOINTMENT (OUTPATIENT)
Dept: RADIOLOGY | Facility: CLINIC | Age: 54
End: 2024-10-18
Payer: COMMERCIAL

## 2024-10-18 DIAGNOSIS — R51.9 HEADACHE, UNSPECIFIED HEADACHE TYPE: ICD-10-CM

## 2024-10-18 DIAGNOSIS — M25.511 RIGHT SHOULDER PAIN, UNSPECIFIED CHRONICITY: ICD-10-CM

## 2024-10-18 DIAGNOSIS — M54.2 CERVICALGIA: ICD-10-CM

## 2024-10-18 DIAGNOSIS — M25.50 ARTHRALGIA, UNSPECIFIED JOINT: ICD-10-CM

## 2024-10-18 LAB
B BURGDOR IGG+IGM SER QL IA: NEGATIVE
CRP SERPL QL: 1.3 MG/L
ERYTHROCYTE [SEDIMENTATION RATE] IN BLOOD: 15 MM/HOUR (ref 0–29)

## 2024-10-18 PROCEDURE — 86140 C-REACTIVE PROTEIN: CPT

## 2024-10-18 PROCEDURE — 73030 X-RAY EXAM OF SHOULDER: CPT

## 2024-10-18 PROCEDURE — 86038 ANTINUCLEAR ANTIBODIES: CPT

## 2024-10-18 PROCEDURE — 86618 LYME DISEASE ANTIBODY: CPT

## 2024-10-18 PROCEDURE — 36415 COLL VENOUS BLD VENIPUNCTURE: CPT

## 2024-10-18 PROCEDURE — 86430 RHEUMATOID FACTOR TEST QUAL: CPT

## 2024-10-18 PROCEDURE — 72050 X-RAY EXAM NECK SPINE 4/5VWS: CPT

## 2024-10-18 PROCEDURE — 85652 RBC SED RATE AUTOMATED: CPT

## 2024-10-19 LAB
ANA SER QL IA: NEGATIVE
RHEUMATOID FACT SER QL LA: NEGATIVE

## 2024-10-22 ENCOUNTER — TELEPHONE (OUTPATIENT)
Dept: PHYSICAL THERAPY | Facility: OTHER | Age: 54
End: 2024-10-22

## 2024-10-22 DIAGNOSIS — M25.511 RIGHT SHOULDER PAIN, UNSPECIFIED CHRONICITY: ICD-10-CM

## 2024-10-22 DIAGNOSIS — M54.2 CERVICALGIA: Primary | ICD-10-CM

## 2024-10-22 RX ORDER — CHLORAL HYDRATE 500 MG
CAPSULE ORAL
COMMUNITY

## 2024-10-22 NOTE — TELEPHONE ENCOUNTER
Anticoagulation Clinic Progress Note    Anticoagulation Summary  As of 2021    INR goal:  2.0-3.0   TTR:  89.6 % (1.6 wk)   INR used for dosin.60 (2021)   Warfarin maintenance plan:  2.5 mg every Mon; 5 mg all other days   Weekly warfarin total:  32.5 mg   Plan last modified:  Iban Howe RPH (2020)   Next INR check:  2021   Target end date:  Indefinite    Indications    Other acute pulmonary embolism  unspecified whether acute cor pulmonale present (CMS/HCC) [I26.99]  Acute pulmonary embolism  unspecified pulmonary embolism type  unspecified whether acute cor pulmonale present (CMS/HCC) [I26.99]             Anticoagulation Episode Summary     INR check location:      Preferred lab:      Send INR reminders to:   [facility]  POOL    Comments:        Anticoagulation Care Providers     Provider Role Specialty Phone number    Ruben Kirby DO Referring Hospitalist [phone]    Lisset Melton MD Referring Cardiology [phone]          INR History:  Anticoagulation Monitoring 2020   INR 2.80 3.00 1.60   INR Date 2020   INR Goal 2.0-3.0 2.0-3.0 2.0-3.0   Trend Down Same Same   Last Week Total 37.5 mg 35 mg 32.5 mg   Next Week Total 32.5 mg 32.5 mg 35 mg   Sun - 5 mg 5 mg   Mon 2.5 mg () - 2.5 mg   Tue 5 mg - 5 mg   Wed - 5 mg 5 mg   Thu - 5 mg 7.5 mg ()   Fri - 5 mg 5 mg   Sat - 5 mg 5 mg   Visit Report - - -   Some recent data might be hidden       Plan:  1. INR is Subtherapeutic today- see above in Anticoagulation Summary.   Provided instructions to Rosa with [facility] to boost warfarin today, then continue their warfarin regimen- see above in Anticoagulation Summary.  2. Follow up in 1 week      Clover Escobar RPH   Patient called into [facility] today 10/22 and left v/m @9:53am regarding her xray (cervical) test result and neck pain.    Returned patient's call @10:02am.    Spoke with patient, explained program, protocol and what we offer.    I suggested patient to discuss Xray results with her FM Doctor.  Patient will f/up with Dr. David Kunz and go from there.    [facility] phone number and hours of business provided. Will further assist patient if she calls back to proceed with triage for advanced PT.    Patient thanked me and I wished her a good day.    NO REF.

## 2024-10-24 ENCOUNTER — EVALUATION (OUTPATIENT)
Dept: PHYSICAL THERAPY | Facility: REHABILITATION | Age: 54
End: 2024-10-24
Payer: COMMERCIAL

## 2024-10-24 DIAGNOSIS — M54.2 CERVICALGIA: ICD-10-CM

## 2024-10-24 DIAGNOSIS — M25.511 RIGHT SHOULDER PAIN, UNSPECIFIED CHRONICITY: ICD-10-CM

## 2024-10-24 PROCEDURE — 97161 PT EVAL LOW COMPLEX 20 MIN: CPT | Performed by: PHYSICAL THERAPIST

## 2024-10-24 PROCEDURE — 97110 THERAPEUTIC EXERCISES: CPT | Performed by: PHYSICAL THERAPIST

## 2024-10-24 PROCEDURE — 97140 MANUAL THERAPY 1/> REGIONS: CPT | Performed by: PHYSICAL THERAPIST

## 2024-10-24 NOTE — PROGRESS NOTES
PT Evaluation     Today's date: 10/24/2024  Patient name: Lucy Rodriguez  : 1970  MRN: [id number]  Referring provider: Yuli Gutierrez PA-C  Dx:   Encounter Diagnosis     ICD-10-CM    1. Cervicalgia  M54.2 Ambulatory Referral to Physical Therapy      2. 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 female who presents with neck pain that began about 3 months ago.  she has decreased mobility of her upper thoracic spine and CTJ resulting in the pain she is experiencing.  No further referral appears necessary at this time based upon examination results.  I expect she will improve in 4-6 weeks. Lucy would benefit from skilled physical therapy to address her hypomobility and allow her to move with less pain.      Understanding of Dx/Px/POC: good     Prognosis: good    Goals  ST-4 weeks  Patient will be independent with home exercise program.   Patient will be able to manage symptoms independently.  Patient will decrease pain by 25-50%    LTG: by discharge  Patient will improve FOTO to goal  Patient will report minimal (1-2/10) pain with aggravating activities to display improvements in overall functional status  Patient will perform full pain free cervical AROM      Plan  Patient would benefit from: skilled physical therapy  Planned modality interventions: cryotherapy, thermotherapy: hydrocollator packs and unattended electrical stimulation    Planned therapy interventions: IADL retraining, joint mobilization,  manual therapy, massage, ADL training, activity modification, abdominal trunk stabilization, ADL retraining, balance, balance/weight bearing training, neuromuscular re-education, body mechanics training, behavior modification, strengthening, stretching, therapeutic activities, therapeutic exercise, therapeutic training, transfer training, graded exercise, graded motor, home exercise program, graded activity, gait training, functional ROM exercises, patient education, postural training, IASTM, kinesiology taping and flexibility    Frequency: 2x week  Duration in weeks: 8  Plan of Care beginning date: 10/24/2024  Plan of Care expiration date: 2024  Treatment plan discussed with: patient        Subjective Evaluation    History of Present Illness  Mechanism of injury: Lucy is a 53 y.o. female presenting to physical therapy on 10/24/24 with referral from MD for neck pain that has been bothering her for 3 months. Reports some issues getting words out. Has had brain MRIs. Does report some bilateral finger tip numbness at times. Reports some headaches as well in the back of her head.           Quality of life: good    Patient Goals  Patient goals for therapy: increased strength, independence with ADLs/IADLs, return to sport/leisure activities, decreased pain and increased motion  Patient goal: less pain with neck movement  Pain  Current pain ratin  At best pain ratin  At worst pain ratin  Quality: dull ache and sharp  Exacerbated by: looking up.  Progression: no change    Treatments  Previous treatment: massage        Objective    Myotomes all intact b/l  Dermatome: all intact b/l               Ligamentous Testing:  Alar Ligament: (-)  Transverse Ligament/Sharp Javi: (-)  Jaron Fx: (-)  VBI: (-)    Palpation: TTP b/l UT,      Cervical  % of normal   Flex. 100%   Extn. 75%   SB Left 100%   SB Right 100%   ROT Left 100%   ROT Right 100%           MMT         AROM          PROM    Shoulder       L        R        L           R      L     R   Flex.   WFL WFL     Abd.   WFL WFL     IR.         ER.                  Upper Trap         Mid Trap         Low Trap         Serratus            Thoracic Spine:          Segmental mobility:  Mid CS= hypomobile lower       CTJ= hypomobile   TS= hypomobile       Precautions: hx of CA, osteopenia    Manuals 10/24            Thoracic mobs QD GrIII            CTJ mobs QD PA GrIII                         Assessment QD            Neuro Re-Ed 10/24            XS                                                                                           Ther Ex 10/24            UBE             CTJ butterfly HEP            Seated retraction extension HEP                                                                HEP/education 10'            Ther Activity                                       Gait Training                                       Modalities

## 2024-10-29 ENCOUNTER — OFFICE VISIT (OUTPATIENT)
Dept: OBGYN CLINIC | Facility: HOSPITAL | Age: 54
End: 2024-10-29
Payer: COMMERCIAL

## 2024-10-29 ENCOUNTER — HOSPITAL ENCOUNTER (OUTPATIENT)
Dept: RADIOLOGY | Facility: HOSPITAL | Age: 54
Discharge: HOME | End: 2024-10-29
Attending: ORTHOPAEDIC SURGERY
Payer: COMMERCIAL

## 2024-10-29 VITALS
HEART RATE: 98 BPM | DIASTOLIC BLOOD PRESSURE: 77 MMHG | BODY MASS INDEX: 25.93 KG/M2 | HEIGHT: 64 IN | SYSTOLIC BLOOD PRESSURE: 120 MMHG | WEIGHT: 151.9 LBS

## 2024-10-29 DIAGNOSIS — M54.2 CERVICALGIA: ICD-10-CM

## 2024-10-29 DIAGNOSIS — R52 PAIN: ICD-10-CM

## 2024-10-29 DIAGNOSIS — R52 PAIN: Primary | ICD-10-CM

## 2024-10-29 PROCEDURE — 72040 X-RAY EXAM NECK SPINE 2-3 VW: CPT

## 2024-10-29 PROCEDURE — 99204 OFFICE O/P NEW MOD 45 MIN: CPT | Performed by: ORTHOPAEDIC SURGERY

## 2024-10-29 RX ORDER — ALPRAZOLAM 0.25 MG/1
0.25 TABLET ORAL AS NEEDED
Qty: 2 TABLET | Refills: 0 | Status: SHIPPED | OUTPATIENT
Start: 2024-10-29

## 2024-10-29 NOTE — PROGRESS NOTES
Assessment & Plan/Medical Decision Makin y.o. female with Neck Pain and Right Shoulder Pain and imaging findings most notable for Cervical Spondylosis        The clinical, physical and imaging findings were reviewed with the patient.  Lucy  has a constellation of findings consistent with Cervical Myofascial Pain and Cervical Radiculopathy in the setting of cervical degenerative disease.  Also likely with right shoulder rotator cuff dysfunction.    Fortunately patient remains neurologically intact and functional. Physical exam showing +Gómez, +pain with right shoulder ROM, +TTP right shoulder region.  We discussed the treatment options including physical therapy, at home exercises, activity modifications, chiropractic medicine, oral medications, interventional spine procedures.  At this time recommend continued conservative treatments.    Given ongoing neck pain with right shoulder pain, +hyperreflexia intermittent balance issues, will [plan to obtain MRI cervical spine to further evaluate patient's symptoms. Will review MRI in detail with patient at follow-up visit and discuss further treatment options at that time.  0.25 mg Xanax rx, one-time dose sent to patient's pharmacy for prior to MRI due to anxiety surrounding MRI.  Discussed reasoning for prescribing medication, proper usage, and potential side effects. Instructed patient that she will need someone to drive her to and from MRI, as she should not drive when taking Xanax. [place] Prescription Drug Monitoring Program report was queried, reviewed and deemed appropriate.    Continue with physical therapy to work on right shoulder ROM exercises, strengthening exercises to target rotator cuff. Also to work on  cervical ROM, strengthening, and stretching exercises.    Patient instructed to return to office/ER sooner if symptoms are not improving, getting worse, or new worrisome/neurologic symptoms arise.  Patient will follow up after cervical  "MRI.     Subjective:      Chief Complaint: Neck Pain    HPI:  Lucy Rodriguez is a 53 y.o. female presenting for initial visit with chief complaint of neck pain - referred by Yuli Gutierrez. Right hand dominant. Ongoing  posterior neck pain, muscle stiffness x3 months without inciting injury or event. Also describes pain into her right shoulder and pain into right periscapular region, noting it feels like she has a \"knot\" in that region. Right shoulder pain worse with lifting/overhead reaching. Neck pain worse with movement such as neck extension. Denies radiation of pain into her upper extremity. Denies left sided symptoms. Describes intermittent tingling in her bilateral fingertips that occurs a few times a week, not constant. Occasionally drops items. Denies changes in fine motor skills or dexterity. Denies changes in handwriting. Denies kylah weakness. Intermittent balance issues and headaches. Denies falls. Denies any kylah trauma. Denies fever or chills, no night sweats. Denies any bladder or bowel changes.      Conservative therapy includes the following:   Medications: tylenol, robaxin    Injections: denies     Physical Therapy: had PT jt on 10/24/2024  Chiropractic Medicine: has not attempted  Accupunture/Massage Therapy: has attempted   These therapeutic modalities were ineffective at providing sustained pain relief/functional improvement.     Nicotine dependent: denies  Occupation: [employer] lab  Living situation: Lives with family   ADLs: patient is able to perform     Objective:     Family History   Problem Relation Age of Onset    Heart disease Mother         Aortic stenosis    Deep vein thrombosis Mother     Osteoarthritis Mother     Thyroid disease Mother     Hypertension Father     Hyperlipidemia Father     Heart disease Father     Seizures Father     Transient ischemic attack Father     Heart failure Maternal Grandmother     Heart attack Maternal Grandmother         age 70 CABG    Heart disease " Maternal Grandmother         age 78     Heart attack Paternal Grandfather         age 44     Heart failure Cousin     Heart attack Maternal Grandfather         age 66     Heart disease Maternal Grandfather     Hyperlipidemia Sister        Past Medical History:   Diagnosis Date    Cancer (HCC)     BCC    Depression 2011    Genital warts     Hearing impaired     Herpes     Miscarriage     Ovarian cyst        Current Outpatient Medications   Medication Sig Dispense Refill    acetaminophen (TYLENOL) 500 mg tablet Take 1,000 mg by mouth every 6 (six) hours as needed      acyclovir (ZOVIRAX) 400 MG tablet Take 1 tablet (400 mg total) by mouth 2 (two) times a day 1 po bid as directed 60 tablet 3    amoxicillin (AMOXIL) 500 mg capsule if needed      Biotin 10 MG TABS Take by mouth daily      Cholecalciferol (Vitamin D3) 125 MCG (5000 UT) CAPS       estradiol (ESTRACE) 0.1 mg/g vaginal cream Insert 0.5 g into the vagina 2 (two) times a week Do not start before 2024. 42.5 g 1    estradiol (Vivelle-Dot) 0.05 MG/24HR Place 1 patch on the skin 2 (two) times a week 24 patch 0    ezetimibe (ZETIA) 10 mg tablet Take 1 tablet (10 mg total) by mouth daily 90 tablet 0    Omega-3 1000 MG CAPS       Progesterone 100 MG CAPS Take 100 mg by mouth daily at bedtime 90 capsule 0     No current facility-administered medications for this visit.       Past Surgical History:   Procedure Laterality Date    CONDYLOMA EXCISION/FULGURATION      DILATION AND CURETTAGE OF UTERUS      JOINT REPLACEMENT Right     knee       Social History     Socioeconomic History    Marital status: /Civil Union     Spouse name: Not on file    Number of children: Not on file    Years of education: Not on file    Highest education level: Not on file   Occupational History    Not on file   Tobacco Use    Smoking status: Former     Current packs/day: 0.00     Average packs/day: 0.5 packs/day for 12.9 years (6.5 ttl pk-yrs)      Types: Cigarettes     Start date: 1986     Quit date: 1998     Years since quittin.9    Smokeless tobacco: Never   Vaping Use    Vaping status: Never Used   Substance and Sexual Activity    Alcohol use: Yes     Alcohol/week: 19.0 standard drinks of alcohol     Types: 5 Glasses of wine, 4 Shots of liquor, 10 Standard drinks or equivalent per week     Comment: social     Drug use: Not Currently     Types: Marijuana    Sexual activity: Not Currently     Partners: Male     Birth control/protection: Male Sterilization     Comment: Too painful   Other Topics Concern    Not on file   Social History Narrative    Not on file     Social Determinants of Health     Financial Resource Strain: Low Risk  (2024)    Overall Financial Resource Strain (CARDIA)     Difficulty of Paying Living Expenses: Not hard at all   Food Insecurity: No Food Insecurity (2024)    Nursing - Inadequate Food Risk Classification     Worried About Running Out of Food in the Last Year: Never true     Ran Out of Food in the Last Year: Never true     Ran Out of Food in the Last Year: Not on file   Transportation Needs: No Transportation Needs (2024)    PRAPARE - Transportation     Lack of Transportation (Medical): No     Lack of Transportation (Non-Medical): No   Physical Activity: Insufficiently Active (9/15/2023)    Received [email or web address]    Exercise Vital Sign     Days of Exercise per Week: 4 days     Minutes of Exercise per Session: 20 min   Stress: Stress Concern Present (9/15/2023)    [facility]    [place] [facility] [employer] - Occupational Stress Questionnaire     Feeling of Stress : To some extent   Social Connections: Moderately Integrated (9/15/2023)    [facility] from [facility], [facility]    Social Connection and Isolation Panel [NHANES]     Frequency of Communication with Friends and  "Family: More than three times a week     Frequency of Social Gatherings with Friends and Family: Twice a week     Attends Jain Services: Never     Active Member of Clubs or Organizations: Yes     Attends Club or Organization Meetings: More than 4 times per year     Marital Status:    Intimate Partner Violence: Not At Risk (9/15/2023)    [facility]    Humiliation, Afraid, Rape, and Kick questionnaire     Fear of Current or Ex-Partner: No     Emotionally Abused: No     Physically Abused: No     Sexually Abused: No   Housing Stability: Low Risk  (5/16/2024)    Housing Stability Vital Sign     Unable to Pay for Housing in the Last Year: No     Number of Times Moved in the Last Year: 0     Homeless in the Last Year: No       Allergies   Allergen Reactions    Gabapentin Anxiety and Delirium    Midazolam Nausea Only and Vomiting    Nickel Dermatitis and Itching    Rosuvastatin Myalgia    Titanium Dermatitis, Itching and Other (See Comments)       Review of Systems  General- denies fever/chills  HEENT- denies hearing loss or sore throat  Eyes- denies eye pain or visual disturbances, denies red eyes  Respiratory- denies cough or SOB  Cardio- denies chest pain or palpitations  GI- denies abdominal pain  Endocrine- denies urinary frequency  Urinary- denies pain with urination  Musculoskeletal- Negative except noted above  Skin- denies rashes or wounds  Neurological- denies dizziness or headache  Psychiatric- denies anxiety or difficulty concentrating    Physical Exam  Ht 5' 4\" (1.626 m)   Wt 68.9 kg (151 lb 14.4 oz)   BMI 26.07 kg/m²     General/Constitutional: No apparent distress: well-nourished and well developed.  Lymphatic: No appreciable lymphadenopathy  Respiratory: Non-labored breathing  Vascular: No edema, swelling or tenderness, except as noted in detailed exam.  Integumentary: No impressive skin lesions present, except as noted in detailed " exam.  Psych: Normal mood and affect, oriented to person, place and time.  MSK: normal other than stated in HPI and exam  Gait & balance: no evidence of myelopathic gait, ambulates Independently     Cervical  spine range of motion:  -Forward flexion chin to chest  -Extension to 60  -Lateral bend 30 right, 30 left  -Rotation 45 right, 45 left.    There is no point tenderness with palpation along the posterior cervical, thoracic, lumbar spine.     Neurologic:  Upper Extremity Motor Function    Right  Left    Deltoid  5/5  5/5    Bicep  5/5  5/5    Wrist extension  5/5  5/5    Tricep  5/5  5/5    Finger flexion/  5/5  5/5    Hand intrinsic  5/5  5/5      Lower Extremity Motor Function    Right  Left    Iliopsoas  5/5  5/5    Quadriceps 5/5 5/5   Tibialis anterior  5/5  5/5    EHL  5/5  5/5    Gastroc. muscle  5/5  5/5    Heel rise  5/5  5/5    Toe rise  5/5  5/5      Sensory: light touch is intact to bilateral upper and lower extremities     Reflexes:    Right Left   Biceps 2+ 2+   Triceps 2+ 2+   Brachioradialis 2+ 2+   Patellar 0+ 1+   Achilles 1+ 1+   Babinski neg neg     Other tests:  Spurling's: negative  Gómez's: positive right  Clonus: negative  Inverted Radial: negative  Tandem gait: equivocal  Romberg: equivocal  Carpal Tinel/Phalen: negative bilateral   Cubital Tinel: negative bilateral   Shoulder: painless active & passive ROM left, painful ROM right shoulder   +TTP over right bicep tendo region    Diagnostic Tests   IMAGING: I have personally reviewed the images and these are my findings:  Cervical Spine X-rays from 10/18/24 and 10/29/24: multi level cervical spondylosis with loss of disc height, osteophyte formation and uncovertebral hypertrophy, C4 on C5, C5 on C6 retrolisthesis, no appreciated lytic/blastic lesions, no obvious instability    Electronic Medical Records were reviewed including office notes, imaging studies, PCP notes    Procedures, if performed today     None performed       Portions  "of the record may have been created with voice recognition software.  Occasional wrong word or \"sound a like\" substitutions may have occurred due to the inherent limitations of voice recognition software.  Read the chart carefully and recognize, using context, where substitutions have occurred.  "

## 2024-10-29 NOTE — LETTER
2024     Sierra Kunz MD  [address] [place] [address]   [address]  [place] [id number]    Patient: Lucy Rodriguez   YOB: 1970   Date of Visit: 10/29/2024       Dear Dr. David Kunz:    Thank you for referring Lucy Rodriguez to me for evaluation. Below are my notes for this consultation.    If you have questions, please do not hesitate to call me. I look forward to following your patient along with you.         Sincerely,        Rodrigo Marcial MD        CC: MIKAELA De Luna MD  10/29/2024  9:08 AM  Sign when Signing Visit  Assessment & Plan/Medical Decision Makin y.o. female with Neck Pain and Right Shoulder Pain and imaging findings most notable for Cervical Spondylosis        The clinical, physical and imaging findings were reviewed with the patient.  Lucy  has a constellation of findings consistent with Cervical Myofascial Pain and Cervical Radiculopathy in the setting of cervical degenerative disease.  Also likely with right shoulder rotator cuff dysfunction.    Fortunately patient remains neurologically intact and functional. Physical exam showing +Gómez, +pain with right shoulder ROM, +TTP right shoulder region.  We discussed the treatment options including physical therapy, at home exercises, activity modifications, chiropractic medicine, oral medications, interventional spine procedures.  At this time recommend continued conservative treatments.    Given ongoing neck pain with right shoulder pain, +hyperreflexia intermittent balance issues, will [plan to obtain MRI cervical spine to further evaluate patient's symptoms. Will review MRI in detail with patient at follow-up visit and discuss further treatment options at that time.  0.25 mg Xanax rx, one-time dose sent to patient's pharmacy for prior to MRI due to anxiety surrounding MRI.  Discussed reasoning for prescribing medication, proper usage, and potential side effects. Instructed  "patient that she will need someone to drive her to and from MRI, as she should not drive when taking Xanax. [place] Prescription Drug Monitoring Program report was queried, reviewed and deemed appropriate.    Continue with physical therapy to work on right shoulder ROM exercises, strengthening exercises to target rotator cuff. Also to work on  cervical ROM, strengthening, and stretching exercises.    Patient instructed to return to office/ER sooner if symptoms are not improving, getting worse, or new worrisome/neurologic symptoms arise.  Patient will follow up after cervical MRI.     Subjective:      Chief Complaint: Neck Pain    HPI:  Lucy Rodriguez is a 53 y.o. female presenting for initial visit with chief complaint of neck pain - referred by Yuli Gutierrez. Right hand dominant. Ongoing  posterior neck pain, muscle stiffness x3 months without inciting injury or event. Also describes pain into her right shoulder and pain into right periscapular region, noting it feels like she has a \"knot\" in that region. Right shoulder pain worse with lifting/overhead reaching. Neck pain worse with movement such as neck extension. Denies radiation of pain into her upper extremity. Denies left sided symptoms. Describes intermittent tingling in her bilateral fingertips that occurs a few times a week, not constant. Occasionally drops items. Denies changes in fine motor skills or dexterity. Denies changes in handwriting. Denies kylah weakness. Intermittent balance issues and headaches. Denies falls. Denies any kylah trauma. Denies fever or chills, no night sweats. Denies any bladder or bowel changes.      Conservative therapy includes the following:   Medications: tylenol, robaxin    Injections: denies     Physical Therapy: had PT jt on 10/24/2024  Chiropractic Medicine: has not attempted  Accupunture/Massage Therapy: has attempted   These therapeutic modalities were ineffective at providing sustained pain relief/functional " improvement.     Nicotine dependent: denies  Occupation: [employer]  Living situation: Lives with family   ADLs: patient is able to perform     Objective:     Family History   Problem Relation Age of Onset   • Heart disease Mother         Aortic stenosis   • Deep vein thrombosis Mother    • Osteoarthritis Mother    • Thyroid disease Mother    • Hypertension Father    • Hyperlipidemia Father    • Heart disease Father    • Seizures Father    • Transient ischemic attack Father    • Heart failure Maternal Grandmother    • Heart attack Maternal Grandmother         age 70 CABG   • Heart disease Maternal Grandmother         age 78    • Heart attack Paternal Grandfather         age 44    • Heart failure Cousin    • Heart attack Maternal Grandfather         age 66    • Heart disease Maternal Grandfather    • Hyperlipidemia Sister        Past Medical History:   Diagnosis Date   • Cancer (HCC)     BCC   • Depression    • Genital warts    • Hearing impaired    • Herpes    • Miscarriage    • Ovarian cyst        Current Outpatient Medications   Medication Sig Dispense Refill   • acetaminophen (TYLENOL) 500 mg tablet Take 1,000 mg by mouth every 6 (six) hours as needed     • acyclovir (ZOVIRAX) 400 MG tablet Take 1 tablet (400 mg total) by mouth 2 (two) times a day 1 po bid as directed 60 tablet 3   • amoxicillin (AMOXIL) 500 mg capsule if needed     • Biotin 10 MG TABS Take by mouth daily     • Cholecalciferol (Vitamin D3) 125 MCG (5000 UT) CAPS      • estradiol (ESTRACE) 0.1 mg/g vaginal cream Insert 0.5 g into the vagina 2 (two) times a week Do not start before 2024. 42.5 g 1   • estradiol (Vivelle-Dot) 0.05 MG/24HR Place 1 patch on the skin 2 (two) times a week 24 patch 0   • ezetimibe (ZETIA) 10 mg tablet Take 1 tablet (10 mg total) by mouth daily 90 tablet 0   • Omega-3 1000 MG CAPS      • Progesterone 100 MG CAPS Take 100 mg by mouth daily at bedtime 90 capsule 0     No current  facility-administered medications for this visit.       Past Surgical History:   Procedure Laterality Date   • CONDYLOMA EXCISION/FULGURATION     • DILATION AND CURETTAGE OF UTERUS     • JOINT REPLACEMENT Right     knee       Social History     Socioeconomic History   • Marital status: /Civil Union     Spouse name: Not on file   • Number of children: Not on file   • Years of education: Not on file   • Highest education level: Not on file   Occupational History   • Not on file   Tobacco Use   • Smoking status: Former     Current packs/day: 0.00     Average packs/day: 0.5 packs/day for 12.9 years (6.5 ttl pk-yrs)     Types: Cigarettes     Start date: 1986     Quit date: 1998     Years since quittin.9   • Smokeless tobacco: Never   Vaping Use   • Vaping status: Never Used   Substance and Sexual Activity   • Alcohol use: Yes     Alcohol/week: 19.0 standard drinks of alcohol     Types: 5 Glasses of wine, 4 Shots of liquor, 10 Standard drinks or equivalent per week     Comment: social    • Drug use: Not Currently     Types: Marijuana   • Sexual activity: Not Currently     Partners: Male     Birth control/protection: Male Sterilization     Comment: Too painful   Other Topics Concern   • Not on file   Social History Narrative   • Not on file     Social Determinants of Health     Financial Resource Strain: Low Risk  (2024)    Overall Financial Resource Strain (CARDIA)    • Difficulty of Paying Living Expenses: Not hard at all   Food Insecurity: No Food Insecurity (2024)    Nursing - Inadequate Food Risk Classification    • Worried About Running Out of Food in the Last Year: Never true    • Ran Out of Food in the Last Year: Never true    • Ran Out of Food in the Last Year: Not on file   Transportation Needs: No Transportation Needs (2024)    PRAPARE - Transportation    • Lack of Transportation (Medical): No    • Lack of Transportation (Non-Medical): No   Physical Activity:  Insufficiently Active (9/15/2023)    Received from [facility]    Exercise Vital Sign    • Days of Exercise per Week: 4 days    • Minutes of Exercise per Session: 20 min   Stress: Stress Concern Present (9/15/2023)    [facility]    [place] [employer] - Occupational Stress Questionnaire    • Feeling of Stress : To some extent   Social Connections: Moderately Integrated (9/15/2023)    [facility] from [facility]    Social Connection and Isolation Panel [NHANES]    • Frequency of Communication with Friends and Family: More than three times a week    • Frequency of Social Gatherings with Friends and Family: Twice a week    • Attends Mor[employer] Services: Never    • Active Member of Clubs or Organizations: Yes    • Attends Club or Organization Meetings: More than 4 times per year    • Marital Status:    Intimate Partner Violence: Not At Risk (9/15/2023)    [facility] from [facility]    Humiliation, Afraid, Rape, and Kick questionnaire    • Fear of Current or Ex-Partner: No    • Emotionally Abused: No    • Physically Abused: No    • Sexually Abused: No   Housing Stability: Low Risk  (5/16/2024)    Housing Stability Vital Sign    • Unable to Pay for Housing in the Last Year: No    • Number of Times Moved in the Last Year: 0    • Homeless in the Last Year: No       Allergies   Allergen Reactions   • Gabapentin Anxiety and Delirium   • Midazolam Nausea Only and Vomiting   • Nickel Dermatitis and Itching   • Rosuvastatin Myalgia   • Titanium Dermatitis, Itching and Other (See Comments)       Review of Systems  General- denies fever/chills  HEENT- denies hearing loss or sore throat  Eyes- denies eye pain or visual disturbances, denies red eyes  Respiratory- denies cough or SOB  Cardio- denies chest pain or palpitations  GI- denies  "abdominal pain  Endocrine- denies urinary frequency  Urinary- denies pain with urination  Musculoskeletal- Negative except noted above  Skin- denies rashes or wounds  Neurological- denies dizziness or headache  Psychiatric- denies anxiety or difficulty concentrating    Physical Exam  Ht 5' 4\" (1.626 m)   Wt 68.9 kg (151 lb 14.4 oz)   BMI 26.07 kg/m²     General/Constitutional: No apparent distress: well-nourished and well developed.  Lymphatic: No appreciable lymphadenopathy  Respiratory: Non-labored breathing  Vascular: No edema, swelling or tenderness, except as noted in detailed exam.  Integumentary: No impressive skin lesions present, except as noted in detailed exam.  Psych: Normal mood and affect, oriented to person, place and time.  MSK: normal other than stated in HPI and exam  Gait & balance: no evidence of myelopathic gait, ambulates Independently     Cervical  spine range of motion:  -Forward flexion chin to chest  -Extension to 60  -Lateral bend 30 right, 30 left  -Rotation 45 right, 45 left.    There is no point tenderness with palpation along the posterior cervical, thoracic, lumbar spine.     Neurologic:  Upper Extremity Motor Function    Right  Left    Deltoid  5/5  5/5    Bicep  5/5  5/5    Wrist extension  5/5  5/5    Tricep  5/5  5/5    Finger flexion/  5/5  5/5    Hand intrinsic  5/5  5/5      Lower Extremity Motor Function    Right  Left    Iliopsoas  5/5  5/5    Quadriceps 5/5 5/5   Tibialis anterior  5/5  5/5    EHL  5/5  5/5    Gastroc. muscle  5/5  5/5    Heel rise  5/5  5/5    Toe rise  5/5  5/5      Sensory: light touch is intact to bilateral upper and lower extremities     Reflexes:    Right Left   Biceps 2+ 2+   Triceps 2+ 2+   Brachioradialis 2+ 2+   Patellar 0+ 1+   Achilles 1+ 1+   Babinski neg neg     Other tests:  Spurling's: negative  Gómez's: positive right  Clonus: negative  Inverted Radial: negative  Tandem gait: equivocal  Romberg: equivocal  Carpal Tinel/Phalen: " "negative bilateral   Cubital Tinel: negative bilateral   Shoulder: painless active & passive ROM left, painful ROM right shoulder   +TTP over right bicep tendo region    Diagnostic Tests   IMAGING: I have personally reviewed the images and these are my findings:  Cervical Spine X-rays from 10/18/24 and 10/29/24: multi level cervical spondylosis with loss of disc height, osteophyte formation and uncovertebral hypertrophy, C4 on C5, C5 on C6 retrolisthesis, no appreciated lytic/blastic lesions, no obvious instability    Electronic Medical Records were reviewed including office notes, imaging studies, PCP notes    Procedures, if performed today     None performed       Portions of the record may have been created with voice recognition software.  Occasional wrong word or \"sound a like\" substitutions may have occurred due to the inherent limitations of voice recognition software.  Read the chart carefully and recognize, using context, where substitutions have occurred.  "

## 2024-10-31 ENCOUNTER — APPOINTMENT (OUTPATIENT)
Dept: PHYSICAL THERAPY | Facility: REHABILITATION | Age: 54
End: 2024-10-31
Payer: COMMERCIAL

## 2024-10-31 DIAGNOSIS — F32.A DEPRESSIVE DISORDER: Primary | ICD-10-CM

## 2024-10-31 RX ORDER — FLUOXETINE 20 MG/1
20 TABLET, FILM COATED ORAL DAILY
Qty: 30 TABLET | Refills: 5 | Status: SHIPPED | OUTPATIENT
Start: 2024-10-31

## 2024-11-01 ENCOUNTER — HOSPITAL ENCOUNTER (OUTPATIENT)
Dept: MRI IMAGING | Facility: HOSPITAL | Age: 54
Discharge: HOME | End: 2024-11-01
Payer: COMMERCIAL

## 2024-11-01 DIAGNOSIS — M54.2 CERVICALGIA: ICD-10-CM

## 2024-11-01 PROCEDURE — 72141 MRI NECK SPINE W/O DYE: CPT

## 2024-11-04 ENCOUNTER — OFFICE VISIT (OUTPATIENT)
Dept: OBGYN CLINIC | Facility: HOSPITAL | Age: 54
End: 2024-11-04
Payer: COMMERCIAL

## 2024-11-04 VITALS — BODY MASS INDEX: 25.93 KG/M2 | WEIGHT: 151.9 LBS | HEIGHT: 64 IN

## 2024-11-04 DIAGNOSIS — M54.2 CERVICALGIA: Primary | ICD-10-CM

## 2024-11-04 DIAGNOSIS — M12.811 ROTATOR CUFF ARTHROPATHY OF RIGHT SHOULDER: ICD-10-CM

## 2024-11-04 PROCEDURE — 99213 OFFICE O/P EST LOW 20 MIN: CPT | Performed by: ORTHOPAEDIC SURGERY

## 2024-11-05 ENCOUNTER — HOSPITAL ENCOUNTER (OUTPATIENT)
Dept: BONE DENSITY | Facility: IMAGING CENTER | Age: 54
Discharge: HOME | End: 2024-11-05
Payer: COMMERCIAL

## 2024-11-05 VITALS — WEIGHT: 149 LBS | BODY MASS INDEX: 24.83 KG/M2 | HEIGHT: 65 IN

## 2024-11-05 DIAGNOSIS — Z78.0 MENOPAUSE: ICD-10-CM

## 2024-11-05 DIAGNOSIS — Z13.820 OSTEOPOROSIS SCREENING: ICD-10-CM

## 2024-11-05 PROCEDURE — 77080 DXA BONE DENSITY AXIAL: CPT

## 2024-11-06 ENCOUNTER — TELEPHONE (OUTPATIENT)
Age: 54
End: 2024-11-06

## 2024-11-06 NOTE — TELEPHONE ENCOUNTER
Patient called for DXA results.  Reviewed provider note with patient.  Pt advised she does take 5000iu of Vitamin D, but does not take any calcium.  She will add that in.  No further questions or concerns at this time.

## 2024-11-06 NOTE — PROGRESS NOTES
"Assessment & Plan/Medical Decision Makin y.o. female with Neck Pain and Right Shoulder Pain and imaging findings most notable for Cervical Spondylosis        The clinical, physical and imaging findings were reviewed with the patient.  Lucy  has a constellation of findings consistent with Cervical Myofascial Pain and Cervical Radiculopathy in the setting of cervical degenerative disease.  Also with right shoulder rotator cuff dysfunction.    Fortunately patient remains neurologically intact and functional.  We discussed the treatment options including physical therapy, at home exercises, activity modifications, chiropractic medicine, oral medications, interventional spine procedures.  At this time recommend continued conservative treatments.    Continue with physical therapy to work on right shoulder ROM exercises, strengthening exercises to target rotator cuff. Also to work on  cervical ROM, strengthening, and stretching exercises.  Referral to pain management for evaluation and treatment. Discussed potential role of steroid injection at or near the source of pain to provide targeted relief.  Rec possible cervical MBB and right shoulder injections.     Patient instructed to return to office/ER sooner if symptoms are not improving, getting worse, or new worrisome/neurologic symptoms arise.  Patient will follow up as needed.      Subjective:      Chief Complaint: Neck Pain    HPI:  Lucy Rodriguez is a 53 y.o. female presenting for initial visit with chief complaint of neck pain - referred by Yuli Gutierrez. Right hand dominant. Ongoing  posterior neck pain, muscle stiffness x3 months without inciting injury or event. Also describes pain into her right shoulder and pain into right periscapular region, noting it feels like she has a \"knot\" in that region. Right shoulder pain worse with lifting/overhead reaching. Neck pain worse with movement such as neck extension. Denies radiation of pain into her upper " extremity. Denies left sided symptoms. Describes intermittent tingling in her bilateral fingertips that occurs a few times a week, not constant. Occasionally drops items. Denies changes in fine motor skills or dexterity. Denies changes in handwriting. Denies kylah weakness. Intermittent balance issues and headaches. Denies falls. Denies any kylah trauma. Denies fever or chills, no night sweats. Denies any bladder or bowel changes.      Conservative therapy includes the following:   Medications: tylenol, robaxin    Injections: denies     Physical Therapy: had PT eval on 10/24/2024  Chiropractic Medicine: has not attempted  Accupunture/Massage Therapy: has attempted   These therapeutic modalities were ineffective at providing sustained pain relief/functional improvement.     Nicotine dependent: denies  Occupation: [employer]  Living situation: Lives with family   ADLs: patient is able to perform     24 Update  Patient is here for follow up.  Obtained cervical MRI.  No changes in symptoms.  Continues with shoulder > neck pain.      Objective:     Family History   Problem Relation Age of Onset    Heart disease Mother         Aortic stenosis    Deep vein thrombosis Mother     Osteoarthritis Mother     Thyroid disease Mother     Hypertension Father     Hyperlipidemia Father     Heart disease Father     Seizures Father     Transient ischemic attack Father     Heart failure Maternal Grandmother     Heart attack Maternal Grandmother         age 70 CABG    Heart disease Maternal Grandmother         age 78     Heart attack Paternal Grandfather         age 44     Heart failure Cousin     Heart attack Maternal Grandfather         age 66     Heart disease Maternal Grandfather     Hyperlipidemia Sister        Past Medical History:   Diagnosis Date    Cancer (HCC) 2008    BCC    Depression 2011    Genital warts     Hearing impaired     Herpes     Miscarriage     Ovarian cyst        Current Outpatient  Medications   Medication Sig Dispense Refill    acetaminophen (TYLENOL) 500 mg tablet Take 1,000 mg by mouth every 6 (six) hours as needed      acyclovir (ZOVIRAX) 400 MG tablet Take 1 tablet (400 mg total) by mouth 2 (two) times a day 1 po bid as directed 60 tablet 3    ALPRAZolam (XANAX) 0.25 mg tablet Take 1 tablet (0.25 mg total) by mouth if needed for anxiety Take 1-2 tablets 1 hour prior to MRI. Do not drive or operate machinery. Please arrange for ride to and from MRI. Do not take any other sedative medications or alcohol while taking Xanax. 2 tablet 0    amoxicillin (AMOXIL) 500 mg capsule if needed      Biotin 10 MG TABS Take by mouth daily      Cholecalciferol (Vitamin D3) 125 MCG (5000 UT) CAPS       estradiol (ESTRACE) 0.1 mg/g vaginal cream Insert 0.5 g into the vagina 2 (two) times a week Do not start before April 18, 2024. 42.5 g 1    estradiol (Vivelle-Dot) 0.05 MG/24HR Place 1 patch on the skin 2 (two) times a week 24 patch 0    ezetimibe (ZETIA) 10 mg tablet Take 1 tablet (10 mg total) by mouth daily 90 tablet 0    FLUoxetine 20 MG tablet Take 1 tablet (20 mg total) by mouth daily 30 tablet 5    Omega-3 1000 MG CAPS  (Patient not taking: Reported on 10/29/2024)      Progesterone 100 MG CAPS Take 100 mg by mouth daily at bedtime 90 capsule 0     No current facility-administered medications for this visit.       Past Surgical History:   Procedure Laterality Date    CONDYLOMA EXCISION/FULGURATION  1987    DILATION AND CURETTAGE OF UTERUS      JOINT REPLACEMENT Right     knee       Social History     Socioeconomic History    Marital status: /Civil Union     Spouse name: Not on file    Number of children: Not on file    Years of education: Not on file    Highest education level: Not on file   Occupational History    Not on file   Tobacco Use    Smoking status: Former     Current packs/day: 0.00     Average packs/day: 0.5 packs/day for 12.9 years (6.5 ttl pk-yrs)     Types: Cigarettes     Start  date: 1986     Quit date: 1998     Years since quittin.9    Smokeless tobacco: Never   Vaping Use    Vaping status: Never Used   Substance and Sexual Activity    Alcohol use: Yes     Alcohol/week: 19.0 standard drinks of alcohol     Types: 5 Glasses of wine, 4 Shots of liquor, 10 Standard drinks or equivalent per week     Comment: social     Drug use: Not Currently     Types: Marijuana    Sexual activity: Not Currently     Partners: Male     Birth control/protection: Male Sterilization     Comment: Too painful   Other Topics Concern    Not on file   Social History Narrative    Not on file     Social Determinants of Health     Financial Resource Strain: Low Risk  (2024)    Overall Financial Resource Strain (CARDIA)     Difficulty of Paying Living Expenses: Not hard at all   Food Insecurity: No Food Insecurity (2024)    Nursing - Inadequate Food Risk Classification     Worried About Running Out of Food in the Last Year: Never true     Ran Out of Food in the Last Year: Never true     Ran Out of Food in the Last Year: Not on file   Transportation Needs: No Transportation Needs (2024)    PRAPARE - Transportation     Lack of Transportation (Medical): No     Lack of Transportation (Non-Medical): No   Physical Activity: Insufficiently Active (9/15/2023)    Received from [facility]    Exercise Vital Sign     Days of Exercise per Week: 4 days     Minutes of Exercise per Session: 20 min   Stress: Stress Concern Present (9/15/2023)    [facility]    [employer] - Occupational Stress Questionnaire     Feeling of Stress : To some extent   Social Connections: Moderately Integrated (9/15/2023)    [facility]    Social Connection and Isolation Panel [NHANES]     Frequency of Communication with Friends and Family: More than three times a  "week     Frequency of Social Gatherings with Friends and Family: Twice a week     Attends Christianity Services: Never     Active Member of Clubs or Organizations: Yes     Attends Club or Organization Meetings: More than 4 times per year     Marital Status:    Intimate Partner Violence: Not At Risk (9/15/2023)    [facility]    Humiliation, Afraid, Rape, and Kick questionnaire     Fear of Current or Ex-Partner: No     Emotionally Abused: No     Physically Abused: No     Sexually Abused: No   Housing Stability: Low Risk  (5/16/2024)    Housing Stability Vital Sign     Unable to Pay for Housing in the Last Year: No     Number of Times Moved in the Last Year: 0     Homeless in the Last Year: No       Allergies   Allergen Reactions    Gabapentin Anxiety and Delirium    Midazolam Nausea Only and Vomiting    Nickel Dermatitis and Itching    Rosuvastatin Myalgia    Titanium Dermatitis, Itching and Other (See Comments)       Review of Systems  General- denies fever/chills  HEENT- denies hearing loss or sore throat  Eyes- denies eye pain or visual disturbances, denies red eyes  Respiratory- denies cough or SOB  Cardio- denies chest pain or palpitations  GI- denies abdominal pain  Endocrine- denies urinary frequency  Urinary- denies pain with urination  Musculoskeletal- Negative except noted above  Skin- denies rashes or wounds  Neurological- denies dizziness or headache  Psychiatric- denies anxiety or difficulty concentrating    Physical Exam  Ht 5' 4\" (1.626 m)   Wt 68.9 kg (151 lb 14.4 oz)   BMI 26.07 kg/m²     General/Constitutional: No apparent distress: well-nourished and well developed.  Lymphatic: No appreciable lymphadenopathy  Respiratory: Non-labored breathing  Vascular: No edema, swelling or tenderness, except as noted in detailed exam.  Integumentary: No impressive skin lesions present, except as noted in detailed exam.  Psych: Normal mood and " affect, oriented to person, place and time.  MSK: normal other than stated in HPI and exam  Gait & balance: no evidence of myelopathic gait, ambulates Independently     Cervical  spine range of motion:  -Forward flexion chin to chest  -Extension to 60  -Lateral bend 30 right, 30 left  -Rotation 45 right, 45 left.    There is no point tenderness with palpation along the posterior cervical, thoracic, lumbar spine.     Neurologic:  Upper Extremity Motor Function    Right  Left    Deltoid  5/5  5/5    Bicep  5/5  5/5    Wrist extension  5/5  5/5    Tricep  5/5  5/5    Finger flexion/  5/5  5/5    Hand intrinsic  5/5  5/5      Lower Extremity Motor Function    Right  Left    Iliopsoas  5/5  5/5    Quadriceps 5/5 5/5   Tibialis anterior  5/5  5/5    EHL  5/5  5/5    Gastroc. muscle  5/5 5/5    Heel rise  5/5  5/5    Toe rise  5/5  5/5      Sensory: light touch is intact to bilateral upper and lower extremities     Reflexes:    Right Left   Biceps 2+ 2+   Triceps 2+ 2+   Brachioradialis 2+ 2+   Patellar 0+ 1+   Achilles 1+ 1+   Babinski neg neg     Other tests:  Spurling's: negative  Gómez's: positive right  Clonus: negative  Inverted Radial: negative  Tandem gait: equivocal  Romberg: equivocal  Carpal Tinel/Phalen: negative bilateral   Cubital Tinel: negative bilateral   Shoulder: painless active & passive ROM left, painful ROM right shoulder   +TTP over right bicep tendo region    Diagnostic Tests   IMAGING: I have personally reviewed the images and these are my findings:  Cervical Spine X-rays from 10/18/24 and 10/29/24: multi level cervical spondylosis with loss of disc height, osteophyte formation and uncovertebral hypertrophy, C4 on C5, C5 on C6 retrolisthesis, no appreciated lytic/blastic lesions, no obvious instability    Cervical MRI from 11/1/24:  multi level cervical disc degeneration, most noted at C4-5 with loss of disc height, multi level varying degrees of mild foraminal stenosis, no significant  "central stenosis or cord signal changes     Electronic Medical Records were reviewed including office notes, imaging studies, PCP notes    Procedures, if performed today     None performed       Portions of the record may have been created with voice recognition software.  Occasional wrong word or \"sound a like\" substitutions may have occurred due to the inherent limitations of voice recognition software.  Read the chart carefully and recognize, using context, where substitutions have occurred.  "

## 2024-11-11 ENCOUNTER — EVALUATION (OUTPATIENT)
Dept: PHYSICAL THERAPY | Facility: CLINIC | Age: 54
End: 2024-11-11
Payer: COMMERCIAL

## 2024-11-11 DIAGNOSIS — M12.811 ROTATOR CUFF ARTHROPATHY OF RIGHT SHOULDER: ICD-10-CM

## 2024-11-11 DIAGNOSIS — M54.2 CERVICALGIA: Primary | ICD-10-CM

## 2024-11-11 PROCEDURE — 97161 PT EVAL LOW COMPLEX 20 MIN: CPT | Performed by: PHYSICAL THERAPIST

## 2024-11-11 PROCEDURE — 97140 MANUAL THERAPY 1/> REGIONS: CPT | Performed by: PHYSICAL THERAPIST

## 2024-11-11 NOTE — PROGRESS NOTES
PT Evaluation     Today's date: 2024  Patient name: Lucy Rodriguez  : 1970  MRN: [id number]  Referring provider: Rodrigo Marcial MD  Dx:   Encounter Diagnosis     ICD-10-CM    1. Rotator cuff arthropathy of right shoulder  M12.811 Ambulatory Referral to Physical Therapy                     Assessment  Impairments: abnormal or restricted ROM, activity intolerance, impaired physical strength, lacks appropriate home exercise program and pain with function  Functional limitations: prolonged sitting posture; lifting  Symptom irritability: low    Assessment details: Pt is 54yo female presents to therapy following chronic neck pain and Rt scapular/shoulder pain. Pt is hypomobility in thoracic spine and upper cervical spine more Rt sided, elevated Rt 1st rib. Pt is hypermobile in the mid cervical spine. Decreased Rt horizontal abd strength as well. Pt would benefit from PT services to improve mobility and strength and stability to decrease pain and discomfort with ADL's.  Understanding of Dx/Px/POC: good     Prognosis: good    Goals  1. Pt will be independent with HEP upon discharge.  2. Pt will improve cervical rom to WNL and painfree.  3. Pt will be able to lift pet food for rabbits without increased pain.  4. Pt will be able to sit on the couch for 45 minutes without increased pain.    Plan  Patient would benefit from: skilled physical therapy    Planned therapy interventions: functional ROM exercises, therapeutic activities, therapeutic exercise, therapeutic training, stretching, strengthening, home exercise program, neuromuscular re-education, manual therapy and patient education    Frequency: 2x week  Duration in weeks: 6  Treatment plan discussed with: patient    Subjective Evaluation    History of Present Illness  Mechanism of injury: Pt reports stiff neck and shoulders about 4 months ago. Pt has tried tylenol and massage. No radicular symptoms but more Rt sided shoulder symptoms. Rt handed. Pt  reports  when sitting she feels she needs something to support her. Lifting things like heavy pet food.  Patient Goals  Patient goals for therapy: decreased pain    Pain  Current pain rating: 3  At worst pain ratin  Location: Rt shoulder  Quality: dull ache  Relieving factors: medications  Aggravating factors: sitting  Progression: no change        Objective     Active Range of Motion   Cervical/Thoracic Spine       Cervical    Flexion:  WFL  Extension:  with pain Restriction level: minimal  Left lateral flexion:  with pain Restriction level: minimal  Right lateral flexion:  with pain Restriction level minimal  Left rotation:  WFL  Right rotation:  WFL    Joint Play     Hypermobile: C3, C4, C5, C6 and C7     Hypomobile: C1, C2, T1, T2, T3, T4, T5, T6 and 1st rib     Pain: 1st rib     Strength/Myotome Testing   Cervical Spine     Left   Normal strength    Right   Normal strength    Additional Strength Details  Except Rt horizontal ABD 3+/5           Precautions: none      Manuals             Rt 1st rib mob             Upper C/S side glides FH            Thoracic mobs FH            CTJ mobs             Neuro Re-Ed             Chin tucks HEP            Rows             Sh Ext             I,T,Ys prone             B/l Sh ER                                       Ther Ex             Thoracic Ext seated HEP            Open book stretch HEP            Pec stretch             Thoracic foam roll protocol                          UBE                                       Ther Activity                                       Gait Training                                       Modalities

## 2024-11-14 DIAGNOSIS — Z78.0 MENOPAUSE: ICD-10-CM

## 2024-11-14 RX ORDER — ESTRADIOL 0.05 MG/D
PATCH, EXTENDED RELEASE TRANSDERMAL
Qty: 24 PATCH | Refills: 0 | Status: SHIPPED | OUTPATIENT
Start: 2024-11-14

## 2024-11-19 ENCOUNTER — OFFICE VISIT (OUTPATIENT)
Dept: PHYSICAL THERAPY | Facility: CLINIC | Age: 54
End: 2024-11-19
Payer: COMMERCIAL

## 2024-11-19 DIAGNOSIS — M12.811 ROTATOR CUFF ARTHROPATHY OF RIGHT SHOULDER: Primary | ICD-10-CM

## 2024-11-19 DIAGNOSIS — M54.2 CERVICALGIA: ICD-10-CM

## 2024-11-19 PROCEDURE — 97110 THERAPEUTIC EXERCISES: CPT | Performed by: PHYSICAL THERAPIST

## 2024-11-19 PROCEDURE — 97140 MANUAL THERAPY 1/> REGIONS: CPT | Performed by: PHYSICAL THERAPIST

## 2024-11-19 PROCEDURE — 97112 NEUROMUSCULAR REEDUCATION: CPT | Performed by: PHYSICAL THERAPIST

## 2024-11-19 NOTE — PROGRESS NOTES
Daily Note     Today's date: 2024  Patient name: Lucy Rodriguez  : 1970  MRN: [id number]  Referring provider: Rodrigo Marcial MD  Dx:   Encounter Diagnosis     ICD-10-CM    1. Rotator cuff arthropathy of right shoulder  M12.811       2. Cervicalgia  M54.2                      Subjective: Pt reports she was a little sore after last session, small improvement though.      Objective: See treatment diary below      Assessment: Pt tolerating manual well. Rt side still more restricted on thoracic and 1st rib. Hypertonic muscles too. Pt tolerated strengthening exercises well. Continue to progress as able.      Plan: Continue per plan of care.      Precautions: none      Manuals            Rt 1st rib mob  FH           Upper C/S side glides FH FH           Thoracic mobs FH FH           CTJ mobs             Neuro Re-Ed             Chin tucks HEP            Rows  15x black           Sh Ext  15x black           I,T,Ys prone  10xea           B/l Sh ER                                       Ther Ex             Thoracic Ext seated HEP            Open book stretch HEP 5x5''ea           Pec stretch  3x30''           Thoracic foam roll protocol  10xea                        UBE  5' retro                                     Ther Activity                                       Gait Training                                       Modalities

## 2024-11-21 ENCOUNTER — OFFICE VISIT (OUTPATIENT)
Dept: PHYSICAL THERAPY | Facility: CLINIC | Age: 54
End: 2024-11-21
Payer: COMMERCIAL

## 2024-11-21 DIAGNOSIS — M54.2 CERVICALGIA: ICD-10-CM

## 2024-11-21 DIAGNOSIS — M12.811 ROTATOR CUFF ARTHROPATHY OF RIGHT SHOULDER: Primary | ICD-10-CM

## 2024-11-21 PROCEDURE — 97110 THERAPEUTIC EXERCISES: CPT | Performed by: PHYSICAL THERAPIST

## 2024-11-21 PROCEDURE — 97140 MANUAL THERAPY 1/> REGIONS: CPT | Performed by: PHYSICAL THERAPIST

## 2024-11-21 PROCEDURE — 97112 NEUROMUSCULAR REEDUCATION: CPT | Performed by: PHYSICAL THERAPIST

## 2024-11-21 NOTE — PROGRESS NOTES
Daily Note     Today's date: 2024  Patient name: Lucy Rodriguez  : 1970  MRN: [id number]  Referring provider: Rodrigo Marcial MD  Dx:   Encounter Diagnosis     ICD-10-CM    1. Rotator cuff arthropathy of right shoulder  M12.811       2. Cervicalgia  M54.2                      Subjective: Pt reports she was alittle sore the evening and morning after last session.      Objective: See treatment diary below      Assessment: Pt still sore with manual treatment. Pt improving with rom and joint mobility. Educated on GH mechanics and exercises. Pt tolerating exercises well. Increased anterior shoulder pain with pushing from elbow on Rt.    Plan: Continue per plan of care.      Precautions: none      Manuals           Rt 1st rib mob  FH FH          Upper C/S side glides FH FH FH          Thoracic mobs FH FH FH          CTJ mobs             Neuro Re-Ed             Chin tucks HEP            Rows  15x black 15x black          Sh Ext  15x black 15x black          I,T,Ys prone  10xea 15xea          B/l Sh ER                                       Ther Ex             Thoracic Ext seated HEP            Open book stretch HEP 5x5''ea           Pec stretch  3x30''           Thoracic foam roll protocol  10xea 15xea                       UBE  5' retro 5' retro                                    Ther Activity                                       Gait Training                                       Modalities

## 2024-11-26 ENCOUNTER — OFFICE VISIT (OUTPATIENT)
Dept: PHYSICAL THERAPY | Facility: CLINIC | Age: 54
End: 2024-11-26
Payer: COMMERCIAL

## 2024-11-26 DIAGNOSIS — M12.811 ROTATOR CUFF ARTHROPATHY OF RIGHT SHOULDER: Primary | ICD-10-CM

## 2024-11-26 DIAGNOSIS — M54.2 CERVICALGIA: ICD-10-CM

## 2024-11-26 PROCEDURE — 97140 MANUAL THERAPY 1/> REGIONS: CPT | Performed by: PHYSICAL THERAPIST

## 2024-11-26 PROCEDURE — 97110 THERAPEUTIC EXERCISES: CPT | Performed by: PHYSICAL THERAPIST

## 2024-11-26 NOTE — PROGRESS NOTES
Daily Note     Today's date: 2024  Patient name: Lucy Rodriguez  : 1970  MRN: [id number]  Referring provider: Rodrigo Marcial MD  Dx:   Encounter Diagnosis     ICD-10-CM    1. Rotator cuff arthropathy of right shoulder  M12.811       2. Cervicalgia  M54.2                      Subjective: Pt reports feeling more mobile. She has to cut her appt short today to take her cat to the vet.      Objective: See treatment diary below      Assessment: Pt's treatment was focused on manual today and educated to complete exercises at home. Pt show improvement with joint mobility. Educated to trial massage for soft tissue tightness.      Plan: Continue per plan of care.      Precautions: none      Manuals          Rt 1st rib mob  FH FH FH         Upper C/S side glides FH FH FH FH         Thoracic mobs FH FH FH FH         CTJ mobs             Neuro Re-Ed             Chin tucks HEP            Rows  15x black 15x black          Sh Ext  15x black 15x black          I,T,Ys prone  10xea 15xea          B/l Sh ER                                       Ther Ex             Thoracic Ext seated HEP            Open book stretch HEP 5x5''ea           Pec stretch  3x30''           Thoracic foam roll protocol  10xea 15xea                       UBE  5' retro 5' retro 5' retro                                   Ther Activity                                       Gait Training                                       Modalities

## 2024-12-03 ENCOUNTER — APPOINTMENT (OUTPATIENT)
Dept: PHYSICAL THERAPY | Facility: CLINIC | Age: 54
End: 2024-12-03
Payer: COMMERCIAL

## 2024-12-05 ENCOUNTER — APPOINTMENT (OUTPATIENT)
Dept: LAB | Facility: CLINIC | Age: 54
End: 2024-12-05
Payer: COMMERCIAL

## 2024-12-05 ENCOUNTER — OFFICE VISIT (OUTPATIENT)
Dept: PHYSICAL THERAPY | Facility: CLINIC | Age: 54
End: 2024-12-05
Payer: COMMERCIAL

## 2024-12-05 DIAGNOSIS — E78.41 ELEVATED LP(A): ICD-10-CM

## 2024-12-05 DIAGNOSIS — M54.2 CERVICALGIA: ICD-10-CM

## 2024-12-05 DIAGNOSIS — E78.5 DYSLIPIDEMIA: ICD-10-CM

## 2024-12-05 DIAGNOSIS — M12.811 ROTATOR CUFF ARTHROPATHY OF RIGHT SHOULDER: Primary | ICD-10-CM

## 2024-12-05 LAB
CHOLEST SERPL-MCNC: 139 MG/DL (ref ?–200)
HDLC SERPL-MCNC: 54 MG/DL
LDLC SERPL CALC-MCNC: 71 MG/DL (ref 0–100)
TRIGL SERPL-MCNC: 71 MG/DL (ref ?–150)

## 2024-12-05 PROCEDURE — 97140 MANUAL THERAPY 1/> REGIONS: CPT | Performed by: PHYSICAL THERAPIST

## 2024-12-05 PROCEDURE — 97110 THERAPEUTIC EXERCISES: CPT | Performed by: PHYSICAL THERAPIST

## 2024-12-05 PROCEDURE — 36415 COLL VENOUS BLD VENIPUNCTURE: CPT

## 2024-12-05 PROCEDURE — 80061 LIPID PANEL: CPT

## 2024-12-05 PROCEDURE — 97112 NEUROMUSCULAR REEDUCATION: CPT | Performed by: PHYSICAL THERAPIST

## 2024-12-05 NOTE — PROGRESS NOTES
Daily Note     Today's date: 2024  Patient name: Lucy Rodriguez  : 1970  MRN: [id number]  Referring provider: Rodrigo Marcial MD  Dx:   Encounter Diagnosis     ICD-10-CM    1. Rotator cuff arthropathy of right shoulder  M12.811       2. Cervicalgia  M54.2                      Subjective: Pt reports gradual improvement.      Objective: See treatment diary below      Assessment: Pt with improvement with joint mobility for manual, some trigger point in scalenes still noted. Pt tolerated exercises well but fatigues with scapular strengthening quickly. Continue to progress as able.      Plan: Continue per plan of care.      Precautions: none      Manuals         Rt 1st rib mob  FH FH FH FH        Upper C/S side glides FH FH FH FH FH        Thoracic mobs FH FH FH FH FH        CTJ mobs             Neuro Re-Ed             Chin tucks HEP            Rows  15x black 15x black          Sh Ext  15x black 15x black          I,T,Ys prone  10xea 15xea  2x10ea        B/l Sh ER     2x10 yellow                                  Ther Ex             Thoracic Ext seated HEP            Open book stretch HEP 5x5''ea           Pec stretch  3x30''   3x30''        Thoracic foam roll protocol  10xea 15xea                       UBE  5' retro 5' retro 5' retro 5' retro                                  Ther Activity                                       Gait Training                                       Modalities

## 2024-12-06 ENCOUNTER — RESULTS FOLLOW-UP (OUTPATIENT)
Dept: CARDIOLOGY CLINIC | Facility: CLINIC | Age: 54
End: 2024-12-06

## 2024-12-11 ENCOUNTER — OFFICE VISIT (OUTPATIENT)
Dept: PHYSICAL THERAPY | Facility: CLINIC | Age: 54
End: 2024-12-11
Payer: COMMERCIAL

## 2024-12-11 DIAGNOSIS — M54.2 CERVICALGIA: ICD-10-CM

## 2024-12-11 DIAGNOSIS — M12.811 ROTATOR CUFF ARTHROPATHY OF RIGHT SHOULDER: Primary | ICD-10-CM

## 2024-12-11 PROCEDURE — 97140 MANUAL THERAPY 1/> REGIONS: CPT | Performed by: PHYSICAL THERAPIST

## 2024-12-11 PROCEDURE — 97110 THERAPEUTIC EXERCISES: CPT | Performed by: PHYSICAL THERAPIST

## 2024-12-11 PROCEDURE — 97112 NEUROMUSCULAR REEDUCATION: CPT | Performed by: PHYSICAL THERAPIST

## 2024-12-11 NOTE — PROGRESS NOTES
Daily Note     Today's date: 2024  Patient name: Lucy Rodriguez  : 1970  MRN: [id number]  Referring provider: Rodrigo Marcial MD  Dx:   Encounter Diagnosis     ICD-10-CM    1. Rotator cuff arthropathy of right shoulder  M12.811       2. Cervicalgia  M54.2                      Subjective: Pt reports she is doing well. Pt reports going to the gym last night with just alittle soreness in her shoulder afterwards.      Objective: See treatment diary below      Assessment: Pt tolerating exercises well. Manual progressing well, thoracic mobs WNL, 1st rib elevated but improved after manual. Trigger points still present in levator scap.    Plan: Continue per plan of care.      Precautions: none      Manuals        Rt 1st rib mob  FH FH FH FH FH       Upper C/S side glides FH FH FH FH FH FH       Thoracic mobs FH FH FH FH FH FH       CTJ mobs             Neuro Re-Ed             Chin tucks HEP            Rows  15x black 15x black   2x15 blue       Sh Ext  15x black 15x black   2x15 blue       I,T,Ys prone  10xea 15xea  2x10ea        B/l Sh ER     2x10 yellow 2x15 yellow                                 Ther Ex             Thoracic Ext seated HEP            Open book stretch HEP 5x5''ea           Pec stretch  3x30''   3x30'' 3x30''       Thoracic foam roll protocol  10xea 15xea                       UBE  5' retro 5' retro 5' retro 5' retro 5' retro                                 Ther Activity                                       Gait Training                                       Modalities

## 2024-12-17 ENCOUNTER — OFFICE VISIT (OUTPATIENT)
Dept: PHYSICAL THERAPY | Facility: CLINIC | Age: 54
End: 2024-12-17
Payer: COMMERCIAL

## 2024-12-17 DIAGNOSIS — M12.811 ROTATOR CUFF ARTHROPATHY OF RIGHT SHOULDER: Primary | ICD-10-CM

## 2024-12-17 DIAGNOSIS — M54.2 CERVICALGIA: ICD-10-CM

## 2024-12-17 PROCEDURE — 97112 NEUROMUSCULAR REEDUCATION: CPT | Performed by: PHYSICAL THERAPIST

## 2024-12-17 PROCEDURE — 97110 THERAPEUTIC EXERCISES: CPT | Performed by: PHYSICAL THERAPIST

## 2024-12-17 PROCEDURE — 97140 MANUAL THERAPY 1/> REGIONS: CPT | Performed by: PHYSICAL THERAPIST

## 2024-12-17 NOTE — PROGRESS NOTES
Daily Note     Today's date: 2024  Patient name: Lucy Rodriguez  : 1970  MRN: [id number]  Referring provider: Rodrigo Marcial MD  Dx:   Encounter Diagnosis     ICD-10-CM    1. Rotator cuff arthropathy of right shoulder  M12.811       2. Cervicalgia  M54.2                      Subjective: Pt reports overall improvement with neck but her Rt shoulder has been bothering her more since going to the gym last week.      Objective: See treatment diary below      Assessment: Pt tolerating manual well, showing much improve thoracic mobility. Added some RTC isometrics with walkouts today for Rt shoulder. Will continue to focus on Rt shoulder strengthening.      Plan: Continue per plan of care.      Precautions: none      Manuals       Rt 1st rib mob  FH FH FH FH FH FH      Upper C/S side glides FH FH FH FH FH FH       Thoracic mobs FH FH FH FH FH FH FH      STM to subscap and infra       FH      Neuro Re-Ed             Chin tucks HEP            Rows  15x black 15x black   2x15 blue 2x15 blue      Sh Ext  15x black 15x black   2x15 blue 2x15 blue      I,T,Ys prone  10xea 15xea  2x10ea  2x10 ea      B/l Sh ER     2x10 yellow 2x15 yellow 20x5'' yellow                                Ther Ex             Thoracic Ext seated HEP            Open book stretch HEP 5x5''ea           Pec stretch  3x30''   3x30'' 3x30'' 3x30''      Thoracic foam roll protocol  10xea 15xea          ER/Flex Walkouts       20xea red      UBE  5' retro 5' retro 5' retro 5' retro 5' retro 5' retro                                Ther Activity                                       Gait Training                                       Modalities

## 2024-12-20 ENCOUNTER — TELEPHONE (OUTPATIENT)
Dept: GYNECOLOGY | Facility: CLINIC | Age: 54
End: 2024-12-20

## 2024-12-20 DIAGNOSIS — Z78.0 MENOPAUSE: ICD-10-CM

## 2024-12-20 RX ORDER — PROGESTERONE 100 MG/1
1 CAPSULE ORAL
Qty: 90 CAPSULE | Refills: 0 | Status: SHIPPED | OUTPATIENT
Start: 2024-12-20

## 2024-12-20 NOTE — TELEPHONE ENCOUNTER
----- Message from TOMMY Allison sent at 12/20/2024 11:31 AM EST -----  Regarding: HRT check  I sent in refill. Pls call pt. She is overdue for HRT check

## 2024-12-23 NOTE — PROGRESS NOTES
Daily Note     Today's date: 2024  Patient name: Lucy Rodriguez  : 1970  MRN: [id number]  Referring provider: Rodrigo Marcial MD  Dx:   Encounter Diagnosis     ICD-10-CM    1. Rotator cuff arthropathy of right shoulder  M12.811       2. Cervicalgia  M54.2           Start Time: 1015  Stop Time: 1051  Total time in clinic (min): 36 minutes                                                                                                                                                                                                                                                                                                                                                                                                                                                                                                                                                                                                                                  Subjective: Patient reports alleviation of neck and shoulder pain/tightness after going to a massage therapist. Expresses no significant soreness after last session.       Objective: See treatment diary below      Assessment: TPR and most tightness evident along infraspinatus. Added more RTC isotonics without an adverse response voiced by patient. Patient demonstrated fatigue post-tx and would benefit from continued PT to improve level of function.       Plan: Continue per POC. Increase reps/resistance as tolerated.      Precautions: none      Manuals      Rt 1st rib mob  FH FH FH FH FH FH DL     Upper C/S side glides FH FH FH FH FH FH       Thoracic mobs FH FH FH FH FH FH FH      STM to subscap and infra       FH MS     Neuro Re-Ed             Chin tucks HEP            Rows  15x black 15x black   2x15 blue 2x15 blue 2x15 blue     Sh Ext  15x black 15x black   2x15 blue 2x15 blue 2x15 blue     I,T,Ys prone  10xea 15xea   2x10ea  2x10 ea 2x10 ea     B/l Sh ER     2x10 yellow 2x15 yellow 20x5'' yellow 20x5'' yellow                               Ther Ex             Thoracic Ext seated HEP            Open book stretch HEP 5x5''ea           Pec stretch  3x30''   3x30'' 3x30'' 3x30'' 3x30''     Thoracic foam roll protocol  10xea 15xea          ER/Flex Walkouts       20xea red 20xea red     UBE  5' retro 5' retro 5' retro 5' retro 5' retro 5' retro 5' retro     Sh IR/ER         Green 2x10 ea                  Ther Activity                                       Gait Training                                       Modalities

## 2024-12-24 ENCOUNTER — OFFICE VISIT (OUTPATIENT)
Dept: PHYSICAL THERAPY | Facility: CLINIC | Age: 54
End: 2024-12-24
Payer: COMMERCIAL

## 2024-12-24 DIAGNOSIS — M12.811 ROTATOR CUFF ARTHROPATHY OF RIGHT SHOULDER: Primary | ICD-10-CM

## 2024-12-24 DIAGNOSIS — M54.2 CERVICALGIA: ICD-10-CM

## 2024-12-24 PROCEDURE — 97110 THERAPEUTIC EXERCISES: CPT

## 2024-12-24 PROCEDURE — 97140 MANUAL THERAPY 1/> REGIONS: CPT

## 2024-12-24 PROCEDURE — 97112 NEUROMUSCULAR REEDUCATION: CPT

## 2024-12-27 ENCOUNTER — OFFICE VISIT (OUTPATIENT)
Dept: CARDIOLOGY CLINIC | Facility: CLINIC | Age: 54
End: 2024-12-27
Payer: COMMERCIAL

## 2024-12-27 VITALS
HEIGHT: 64 IN | SYSTOLIC BLOOD PRESSURE: 110 MMHG | BODY MASS INDEX: 24.92 KG/M2 | DIASTOLIC BLOOD PRESSURE: 60 MMHG | OXYGEN SATURATION: 98 % | HEART RATE: 66 BPM | WEIGHT: 146 LBS

## 2024-12-27 DIAGNOSIS — E78.5 HYPERLIPIDEMIA, UNSPECIFIED HYPERLIPIDEMIA TYPE: ICD-10-CM

## 2024-12-27 DIAGNOSIS — E78.41 ELEVATED LIPOPROTEIN(A): ICD-10-CM

## 2024-12-27 DIAGNOSIS — R93.1 ELEVATED CORONARY ARTERY CALCIUM SCORE: Primary | ICD-10-CM

## 2024-12-27 DIAGNOSIS — I95.9 HYPOTENSION, UNSPECIFIED HYPOTENSION TYPE: ICD-10-CM

## 2024-12-27 PROCEDURE — 99214 OFFICE O/P EST MOD 30 MIN: CPT | Performed by: INTERNAL MEDICINE

## 2024-12-27 NOTE — PROGRESS NOTES
Cardiology Follow Up    Lucy Rodriguez  1970  [id number]  [facility] [place]  [address]  [address]  [place] [address] [phone]    1. Elevated coronary artery calcium score        2. Hypotension, unspecified hypotension type            Diagnoses and all orders for this visit:    Elevated coronary artery calcium score    Hypotension, unspecified hypotension type      I had the pleasure of seeing Lucy Rodriguez for a follow up visit.     INTERVAL HISTORY: none    History of the presenting illness, Discussion/Summary and My Plan are as follows:::    Lucy is a pleasant 54-year-old lady without a history of hypertension, diabetes, dyslipidemia. She has only about a 6 pack year smoking history-quit at the age of 28.  Alcohol is about 2 glasses of wine a day on average.  She is on a completely plant based diet and a diet history was also obtained and is quite healthy.     She does not have a family history of premature vascular disease in first-degree relatives-mom is 79 with rheumatoid arthritis and hypothyroidism without vascular disease, father is 81 and quite healthy despite having hypertension and hyperlipidemia.  Her sister at 53 is on propranolol for palpitations, also has hyperlipidemia.     She mainly follows for a slightly elevated LP(a) at 89, done because she was aware of it and requested it be checked. Her calcium score was only 6 (all in the LAD) but still putting her in the 88th percentile and then initiated her on a statin - June 2021.     She is completely asymptomatic from a cardiac standpoint, was quite active, the [place] up to 30-45 minutes a day (4 days a week), no symptoms.     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 She has had ordinances passed promoting adoption of pets and remains very active without any cardiac symptoms,      Prior to therapy, Lipid profile was unremarkable with total  cholesterol 163, triglycerides 68, HDL 68, LDL 81 and non HDL 95.-March 2021    Statin history is as follows:    Tried Rosuvastatin 2.5 mg in June 2021 and tolerated it well,  Increased to 5 mg daily in Dec 2021  Had side effects-Myalgias on rosuvastatin 5 mg  After a statin holiday, decreased it to rosuvastatin 2.5 mg daily-similar side effects    Attempted pravastatin 10 mg--started in May 2022 - numbers did not improve, increased paradoxically, subsequently stopped her TDS-HRT as well and still no lower than baseline    Atorvastatin 10 mg every other day without a discernible change in lipid profile and had hand arthralgias.    Then started on Zetia 10 mg-April 2023-now with an excellent reduction    Occasionally feels dizzy when she stands up too quickly.  No neuropathic symptoms.  No presyncope or syncope or falls     Plan:    Elevated lipoprotein (a): probably a risk factor for vascular disease  In general in patients with vascular disease and elevated LP(a), goal is to lower LDL to the maximum tolerated level.  In her case, LDL and non-HDL are within normal limits.    Underwent a stress test- June 2021 that showed excellent functional capacity without ischemia.  Calcium score while only at 6, already put her in the 88% diet and hence started her on a statin with which her LDL and non-HDL have only modetly decreased    Continue Ezetemibe 10 mg daily - no further changes (myalgias to rosuva and atorva, paradoxial rise with prava)  Goal would be an LDL level around 40-50 mg/dL  Prior to therapy total cholesterol was around 165  and LDL around 72, had increased due to weight gain (July 2024 to 99) but lower now  No change to meds at this visit     She will continue to lead her very healthy lifestyle including activity and a plant based diet.     Occasional orthostatic lightheadedness: Adequate hydration, slow changes in position from crouching down.  No symptoms of neuropathy.  Sometimes uses over-the-counter  compression stockings when she works but does not seem to help.  Hold off on any medications at this time since episodes are overall rare.     Follow-up in 12 months     Latest Reference Range & Units 04/05/23 13:50 07/25/23 11:11 03/26/24 07:55 07/31/24 10:54   161 lb  Zetia 10 12/05/24 11:26   146 lb   Zetia 10   Cholesterol See Comment mg/dL 198 150 164 191 139   Triglycerides See Comment mg/dL 82 66 52 64 71   HDL >=50 mg/dL 96 85 73 79 54   LDL Calculated 0 - 100 mg/dL 86 52 81 99 71        Latest Reference Range & Units 07/25/23 11:11  Zetia 10 mg   Cholesterol See Comment mg/dL 150   Triglycerides See Comment mg/dL 66   HDL >=50 mg/dL 85   LDL Calculated 0 - 100 mg/dL 52        Latest Reference Range & Units 12/02/21 08:46  Rosuva ? 09/14/22 09:08  Prava 10 12/07/22 10:04  Prava 10   Cholesterol See Comment mg/dL 165 199 187   Triglycerides See Comment mg/dL 120 56 85   HDL >=50 mg/dL 69 86 82   LDL Calculated 0 - 100 mg/dL 72 102 (H) 88   (H): Data is abnormally high    Results for MAGGIE DEXTER (MRN [id number]) as of 12/6/2021 17:09   Ref. 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)    Depressive disorder    Family history of osteoporosis    Fatigue    History of revision of total replacement of knee joint    H/O arthroscopy of right knee    History of total right knee replacement    Memory loss    OAB (overactive bladder)    Osteopenia of hip    Other specified health status    Post-menopausal    Ovarian cyst    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, left    Sensorineural hearing loss (SNHL) of both ears     Sleep disturbance    ALY (stress urinary incontinence, female)    Vitamin D deficiency    Other hyperlipidemia    Hypotension    SOB (shortness of breath)    Nausea    Dizziness    Tinnitus of both ears    Imbalance    Headache, unspecified headache type    Cerebellar tonsillar ectopia (HCC)    Abnormal brain MRI    Cervicalgia     Past Medical History:   Diagnosis Date    Cancer (HCC) 2008    BCC    Depression 2011    Genital warts     Hearing impaired     Herpes     Miscarriage     Ovarian cyst      Social History     Socioeconomic History    Marital status: /Civil Union     Spouse name: Not on file    Number of children: Not on file    Years of education: Not on file    Highest education level: Not on file   Occupational History    Not on file   Tobacco Use    Smoking status: Former     Current packs/day: 0.00     Average packs/day: 0.5 packs/day for 12.9 years (6.5 ttl pk-yrs)     Types: Cigarettes     Start date: 1986     Quit date: 1998     Years since quittin.0    Smokeless tobacco: Never   Vaping Use    Vaping status: Never Used   Substance and Sexual Activity    Alcohol use: Yes     Alcohol/week: 19.0 standard drinks of alcohol     Types: 5 Glasses of wine, 4 Shots of liquor, 10 Standard drinks or equivalent per week     Comment: social     Drug use: Not Currently     Types: Marijuana    Sexual activity: Not Currently     Partners: Male     Birth control/protection: Male Sterilization     Comment: Too painful   Other Topics Concern    Not on file   Social History Narrative    Not on file     Social Drivers of Health     Financial Resource Strain: Low Risk  (2024)    Overall Financial Resource Strain (CARDIA)     Difficulty of Paying Living Expenses: Not hard at all   Food Insecurity: No Food Insecurity (2024)    Nursing - Inadequate Food Risk Classification     Worried About Running Out of Food in the Last Year: Never true     Ran Out of Food in the Last Year: Never true      Ran Out of Food in the Last Year: Not on file   Transportation Needs: No Transportation Needs (5/16/2024)    PRAPARE - Transportation     Lack of Transportation (Medical): No     Lack of Transportation (Non-Medical): No   Physical Activity: Insufficiently Active (9/15/2023)    Received from [facility]    Exercise Vital Sign     Days of Exercise per Week: 4 days     Minutes of Exercise per Session: 20 min   Stress: Stress Concern Present (9/15/2023)    [facility], [facility]    [employer] [place] [employer] - Occupational Stress Questionnaire     Feeling of Stress : To some extent   Social Connections: Moderately Integrated (9/15/2023)    Received from [facility], [facility]    Social Connection and Isolation Panel [NHANES]     Frequency of Communication with Friends and Family: More than three times a week     Frequency of Social Gatherings with Friends and Family: Twice a week     Attends Zoroastrian Services: Never     Active Member of Clubs or Organizations: Yes     Attends Club or Organization Meetings: More than 4 times per year     Marital Status:    Intimate Partner Violence: Not At Risk (9/15/2023)    [facility] from [facility], [facility]    Humiliation, Afraid, Rape, and Kick questionnaire     Fear of Current or Ex-Partner: No     Emotionally Abused: No     Physically Abused: No     Sexually Abused: No   Housing Stability: Low Risk  (5/16/2024)    Housing Stability Vital Sign     Unable to Pay for Housing in the Last Year: No     Number of Times Moved in the Last Year: 0     Homeless in the Last Year: No      Family History   Problem Relation Age of Onset    Heart disease Mother         Aortic stenosis    Deep vein thrombosis Mother     Osteoarthritis Mother     Thyroid disease Mother     Hypertension Father     Hyperlipidemia Father     Heart disease  Father     Seizures Father     Transient ischemic attack Father     Heart failure Maternal Grandmother     Heart attack Maternal Grandmother         age 70 CABG    Heart disease Maternal Grandmother         age 78     Heart attack Paternal Grandfather         age 44     Heart failure Cousin     Heart attack Maternal Grandfather         age 66     Heart disease Maternal Grandfather     Hyperlipidemia Sister      Past Surgical History:   Procedure Laterality Date    CONDYLOMA EXCISION/FULGURATION      DILATION AND CURETTAGE OF UTERUS      JOINT REPLACEMENT Right     knee       Current Outpatient Medications:     acetaminophen (TYLENOL) 500 mg tablet, Take 1,000 mg by mouth every 6 (six) hours as needed, Disp: , Rfl:     acyclovir (ZOVIRAX) 400 MG tablet, Take 1 tablet (400 mg total) by mouth 2 (two) times a day 1 po bid as directed, Disp: 60 tablet, Rfl: 3    amoxicillin (AMOXIL) 500 mg capsule, if needed, Disp: , Rfl:     Biotin 10 MG TABS, Take by mouth daily, Disp: , Rfl:     Cholecalciferol (Vitamin D3) 125 MCG (5000 UT) CAPS, , Disp: , Rfl:     Bonita 0.05 MG/24HR, PLACE 1 PATCH ON THE SKIN TWO TIMES A WEEK., Disp: 24 patch, Rfl: 0    estradiol (ESTRACE) 0.1 mg/g vaginal cream, Insert 0.5 g into the vagina 2 (two) times a week Do not start before 2024., Disp: 42.5 g, Rfl: 1    ezetimibe (ZETIA) 10 mg tablet, Take 1 tablet (10 mg total) by mouth daily, Disp: 90 tablet, Rfl: 0    FLUoxetine 20 MG tablet, Take 1 tablet (20 mg total) by mouth daily, Disp: 30 tablet, Rfl: 5    Progesterone 100 MG CAPS, TAKE ONE CAPSULE BY MOUTH DAILY AT BEDTIME, Disp: 90 capsule, Rfl: 0    ALPRAZolam (XANAX) 0.25 mg tablet, Take 1 tablet (0.25 mg total) by mouth if needed for anxiety Take 1-2 tablets 1 hour prior to MRI. Do not drive or operate machinery. Please arrange for ride to and from MRI. Do not take any other sedative medications or alcohol while taking Xanax. (Patient not taking: Reported  "on 12/27/2024), Disp: 2 tablet, Rfl: 0    Omega-3 1000 MG CAPS, , Disp: , Rfl:   Allergies   Allergen Reactions    Gabapentin Anxiety and Delirium    Midazolam Nausea Only and Vomiting    Nickel Dermatitis and Itching    Rosuvastatin Myalgia    Titanium Dermatitis, Itching and Other (See Comments)       Imaging: No results found.    Review of Systems:  Review of Systems   Constitutional: Negative.    HENT: Negative.     Eyes: Negative.    Respiratory: Negative.     Cardiovascular: Negative.    Endocrine: Negative.    Musculoskeletal: Negative.    Neurological: Negative.        Physical Exam:  /68 (BP Location: Right arm, Patient Position: Sitting, Cuff Size: Standard)   Pulse 66   Ht 5' 4\" (1.626 m)   Wt 66.2 kg (146 lb)   SpO2 98%   BMI 25.06 kg/m²   Physical Exam  Constitutional:       General: She is not in acute distress.     Appearance: Normal appearance. She is not ill-appearing.   HENT:      Head: Normocephalic.      Nose: Nose normal. No congestion or rhinorrhea.      Mouth/Throat:      Mouth: Mucous membranes are moist.      Pharynx: Oropharynx is clear. No oropharyngeal exudate or posterior oropharyngeal erythema.   Eyes:      General:         Right eye: No discharge.         Left eye: No discharge.      Pupils: Pupils are equal, round, and reactive to light.   Cardiovascular:      Rate and Rhythm: Normal rate and regular rhythm.      Pulses: Normal pulses.      Heart sounds: No murmur heard.     No friction rub. No gallop.   Pulmonary:      Effort: Pulmonary effort is normal. No respiratory distress.      Breath sounds: No stridor. No wheezing or rhonchi.   Musculoskeletal:         General: No swelling or tenderness. Normal range of motion.      Cervical back: Normal range of motion. No rigidity or tenderness.   Skin:     General: Skin is warm.      Coloration: Skin is not jaundiced or pale.      Findings: No bruising or erythema.   Neurological:      Mental Status: She is alert.         This " "note was completed in part utilizing [employer] direct voice recognition software.   Grammatical errors, random word insertion, spelling mistakes, occasional wrong word or \"sound-alike\" substitutions and incomplete sentences may be an occasional consequence of the system secondary to software limitations, ambient noise and hardware issues. At the time of dictation, efforts were made to edit, clarify and /or correct errors.  Please read the chart carefully and recognize, using context, where substitutions have occurred.  If you have any questions or concerns about the context, text or information contained within the body of this dictation, please contact myself, the provider, for further clarification.  "

## 2024-12-31 ENCOUNTER — OFFICE VISIT (OUTPATIENT)
Dept: PHYSICAL THERAPY | Facility: CLINIC | Age: 54
End: 2024-12-31
Payer: COMMERCIAL

## 2024-12-31 DIAGNOSIS — M12.811 ROTATOR CUFF ARTHROPATHY OF RIGHT SHOULDER: Primary | ICD-10-CM

## 2024-12-31 DIAGNOSIS — M54.2 CERVICALGIA: ICD-10-CM

## 2024-12-31 PROCEDURE — 97112 NEUROMUSCULAR REEDUCATION: CPT | Performed by: PHYSICAL THERAPIST

## 2024-12-31 PROCEDURE — 97140 MANUAL THERAPY 1/> REGIONS: CPT | Performed by: PHYSICAL THERAPIST

## 2024-12-31 PROCEDURE — 97110 THERAPEUTIC EXERCISES: CPT | Performed by: PHYSICAL THERAPIST

## 2024-12-31 NOTE — PROGRESS NOTES
Daily Note     Today's date: 2024  Patient name: Lucy Rodriguez  : 1970  MRN: [id number]  Referring provider: Rodrigo Marcial MD  Dx:   Encounter Diagnosis     ICD-10-CM    1. Rotator cuff arthropathy of right shoulder  M12.811       2. Cervicalgia  M54.2                      Subjective: Pt reports her shoulder is still bothering her with specific motions.      Objective: See treatment diary below      Assessment: Added laser for shoulder. Fatigued with current POC. Monitor and progress as able.      Plan: Continue per plan of care.      Precautions: none      Manuals     Rt 1st rib mob  FH FH FH FH FH FH DL     Upper C/S side glides FH FH FH FH FH FH       Thoracic mobs FH FH FH FH FH FH FH      STM to subscap and infra       FH MS     Laser Rt shoulder         16W 4' FH    Neuro Re-Ed             Chin tucks HEP            Rows  15x black 15x black   2x15 blue 2x15 blue 2x15 blue 2x15 black    Sh Ext  15x black 15x black   2x15 blue 2x15 blue 2x15 blue 2x15 black    I,T,Ys prone  10xea 15xea  2x10ea  2x10 ea 2x10 ea     B/l Sh ER     2x10 yellow 2x15 yellow 20x5'' yellow 20x5'' yellow 20x5'' yellow                              Ther Ex             Thoracic Ext seated HEP            Open book stretch HEP 5x5''ea           Pec stretch  3x30''   3x30'' 3x30'' 3x30'' 3x30'' 3x30''    Thoracic foam roll protocol  10xea 15xea          ER/Flex Walkouts       20xea red 20xea red 20xea red    UBE  5' retro 5' retro 5' retro 5' retro 5' retro 5' retro 5' retro 5' retro    Sh IR/ER         Green 2x10 ea Green ER; blue IR 2x10 ea                 Ther Activity                                       Gait Training                                       Modalities

## 2025-01-02 ENCOUNTER — OFFICE VISIT (OUTPATIENT)
Dept: PHYSICAL THERAPY | Facility: CLINIC | Age: 55
End: 2025-01-02
Payer: COMMERCIAL

## 2025-01-02 DIAGNOSIS — M54.2 CERVICALGIA: ICD-10-CM

## 2025-01-02 DIAGNOSIS — M12.811 ROTATOR CUFF ARTHROPATHY OF RIGHT SHOULDER: Primary | ICD-10-CM

## 2025-01-02 PROCEDURE — 97110 THERAPEUTIC EXERCISES: CPT

## 2025-01-02 PROCEDURE — 97112 NEUROMUSCULAR REEDUCATION: CPT

## 2025-01-02 NOTE — PROGRESS NOTES
Daily Note     Today's date: 2025  Patient name: Lucy Rodriguez  : 1970  MRN: [id number]  Referring provider: Rodrigo Marcial MD  Dx:   Encounter Diagnosis     ICD-10-CM    1. Rotator cuff arthropathy of right shoulder  M12.811       2. Cervicalgia  M54.2             Start Time: 1159  Stop Time: 1230  Total time in clinic (min): 31 minutes    Subjective: Notes c-s stiffness prior to start of session. States that shoulder continues with soreness with activity.       Objective: See treatment diary below      Assessment: Continued with PT POC with good tolerance. No inc sxs with activities. Patient would benefit from continued PT to improve function.      Plan: Continue per plan of care.      Precautions: none      Manuals 25   Rt 1st rib mob  FH FH FH FH FH FH DL     Upper C/S side glides FH FH FH FH FH FH       Thoracic mobs FH FH FH FH FH FH FH      STM to subscap and infra       FH MS     Laser Rt shoulder         16W 4' FH 16W 4' LQ   Neuro Re-Ed             Chin tucks HEP            Rows  15x black 15x black   2x15 blue 2x15 blue 2x15 blue 2x15 black 2x15 black   Sh Ext  15x black 15x black   2x15 blue 2x15 blue 2x15 blue 2x15 black 2x15 black   I,T,Ys prone  10xea 15xea  2x10ea  2x10 ea 2x10 ea     B/l Sh ER     2x10 yellow 2x15 yellow 20x5'' yellow 20x5'' yellow 20x5'' yellow 20x5'' yellow                             Ther Ex             Thoracic Ext seated HEP            Open book stretch HEP 5x5''ea           Pec stretch  3x30''   3x30'' 3x30'' 3x30'' 3x30'' 3x30'' 3x30''   Thoracic foam roll protocol  10xea 15xea          ER/Flex Walkouts       20xea red 20xea red 20xea red 20x ER red   UBE  5' retro 5' retro 5' retro 5' retro 5' retro 5' retro 5' retro 5' retro 5' retro   Sh IR/ER         Green 2x10 ea Green ER; blue IR 2x10 ea Green ER; blue IR 2x10 ea                Ther Activity                                       Gait Training                                        Modalities

## 2025-01-07 ENCOUNTER — APPOINTMENT (OUTPATIENT)
Dept: PHYSICAL THERAPY | Facility: CLINIC | Age: 55
End: 2025-01-07
Payer: COMMERCIAL

## 2025-01-07 DIAGNOSIS — M12.811 ROTATOR CUFF ARTHROPATHY OF RIGHT SHOULDER: Primary | ICD-10-CM

## 2025-01-07 DIAGNOSIS — M54.2 CERVICALGIA: ICD-10-CM

## 2025-01-07 NOTE — PROGRESS NOTES
Daily Note     Today's date: 2025  Patient name: Lucy Rodriguez  : 1970  MRN: [id number]  Referring provider: Rodrigo Marcial MD  Dx:   Encounter Diagnosis     ICD-10-CM    1. Rotator cuff arthropathy of right shoulder  M12.811       2. Cervicalgia  M54.2                        Subjective: Notes c-s stiffness prior to start of session. States that shoulder continues with soreness with activity.       Objective: See treatment diary below      Assessment: Continued with PT POC with good tolerance. No inc sxs with activities. Patient would benefit from continued PT to improve function.      Plan: Continue per plan of care.      Precautions: none      Manuals 25   Rt 1st rib mob   FH FH FH FH FH DL     Upper C/S side glides   FH FH FH FH       Thoracic mobs   FH FH FH FH FH      STM to subscap and infra       FH MS     Laser Rt shoulder         16W 4' FH 16W 4' LQ   Neuro Re-Ed             Chin tucks             Rows   15x black   2x15 blue 2x15 blue 2x15 blue 2x15 black 2x15 black   Sh Ext   15x black   2x15 blue 2x15 blue 2x15 blue 2x15 black 2x15 black   I,T,Ys prone   15xea  2x10ea  2x10 ea 2x10 ea     B/l Sh ER     2x10 yellow 2x15 yellow 20x5'' yellow 20x5'' yellow 20x5'' yellow 20x5'' yellow                             Ther Ex             Thoracic Ext seated             Open book stretch             Pec stretch     3x30'' 3x30'' 3x30'' 3x30'' 3x30'' 3x30''   Thoracic foam roll protocol   15xea          ER/Flex Walkouts       20xea red 20xea red 20xea red 20x ER red   UBE ***  5' retro 5' retro 5' retro 5' retro 5' retro 5' retro 5' retro 5' retro   Sh IR/ER         Green 2x10 ea Green ER; blue IR 2x10 ea Green ER; blue IR 2x10 ea                Ther Activity                                       Gait Training                                       Modalities

## 2025-01-09 ENCOUNTER — OFFICE VISIT (OUTPATIENT)
Dept: PHYSICAL THERAPY | Facility: CLINIC | Age: 55
End: 2025-01-09
Payer: COMMERCIAL

## 2025-01-09 DIAGNOSIS — M54.2 CERVICALGIA: ICD-10-CM

## 2025-01-09 DIAGNOSIS — M12.811 ROTATOR CUFF ARTHROPATHY OF RIGHT SHOULDER: Primary | ICD-10-CM

## 2025-01-09 PROCEDURE — 97112 NEUROMUSCULAR REEDUCATION: CPT

## 2025-01-09 PROCEDURE — 97110 THERAPEUTIC EXERCISES: CPT

## 2025-01-09 NOTE — PROGRESS NOTES
Daily Note     Today's date: 2025  Patient name: Lucy Rodriguez  : 1970  MRN: [id number]  Referring provider: Rodrigo Marcial MD  Dx:   Encounter Diagnosis     ICD-10-CM    1. Rotator cuff arthropathy of right shoulder  M12.811       2. Cervicalgia  M54.2           Start Time: 1531  Stop Time: 1555  Total time in clinic (min): 24 minutes    Subjective: Patient reports pain has been well managed and feels ready for discharge.       Objective: See treatment diary below    Access Code: [email or web address]  URL: [email or web address]  Date: 2025  Prepared by: Chelsi Harry    Exercises  - Seated Thoracic Lumbar Extension  - 2 x daily - 7 x weekly - 1 sets - 10 reps - 5sec hold  - Sidelying Open Book Thoracic Lumbar Rotation and Extension  - 2 x daily - 7 x weekly - 1 sets - 10 reps - 5sec hold  - Supine Cervical Retraction with Towel  - 2 x daily - 7 x weekly - 1 sets - 10 reps - 5sec hold  - Shoulder External Rotation and Scapular Retraction with Resistance  - 1 x daily - 7 x weekly - 2 sets - 10 reps - 5 hold  - Prone Single Arm Shoulder Horizontal Abduction with Scapular Retraction and Palm Down  - 1 x daily - 7 x weekly - 2 sets - 10 reps  - Prone Single Arm Shoulder Y  - 1 x daily - 7 x weekly - 2 sets - 10 reps  - Prone Shoulder Extension - Single Arm  - 1 x daily - 7 x weekly - 2 sets - 10 reps  - Doorway Pec Stretch at 90 Degrees Abduction  - 1 x daily - 7 x weekly - 3 sets - 30 hold      Assessment: Patient tolerating strengthening well and has shown subjective and objective improvements since initial visits. Patient and primary therapist agreeable to discharge after today's session given strength, mobility, and pain improvements. Updated HEP and provided patient with green TB prior to departure. Patient would benefit from continued PT to improve level of function.       Plan: Patient discharged from OP PT following today's visit.      Precautions: none      Manuals   "11/26 12/5 12/11 12/17 12/24 12/31 1/2/25 1/9   Rt 1st rib mob  FH FH FH FH FH FH DL      Upper C/S side glides FH FH FH FH FH FH        Thoracic mobs FH FH FH FH FH FH FH       STM to subscap and infra       FH MS      Laser Rt shoulder         16W 4' FH 16W 4' LQ    Neuro Re-Ed              Chin tucks HEP             Rows  15x black 15x black   2x15 blue 2x15 blue 2x15 blue 2x15 black 2x15 black 13.0# 2x10   Sh Ext  15x black 15x black   2x15 blue 2x15 blue 2x15 blue 2x15 black 2x15 black 11.1# 2x10   I,T,Ys prone  10xea 15xea  2x10ea  2x10 ea 2x10 ea      B/l Sh ER     2x10 yellow 2x15 yellow 20x5'' yellow 20x5'' yellow 20x5'' yellow 20x5'' yellow Red 20x5\"                               Ther Ex              Thoracic Ext seated HEP             Open book stretch HEP 5x5''ea            Pec stretch  3x30''   3x30'' 3x30'' 3x30'' 3x30'' 3x30'' 3x30'' 3x30''   Thoracic foam roll protocol  10xea 15xea           ER/Flex Walkouts       20xea red 20xea red 20xea red 20x ER red Green ER 10x   UBE  5' retro 5' retro 5' retro 5' retro 5' retro 5' retro 5' retro 5' retro 5' retro 5' retro   Sh IR/ER         Green 2x10 ea Green ER; blue IR 2x10 ea Green ER; blue IR 2x10 ea Green ER; blue IR 2x10 ea                 Ther Activity                                          Gait Training                                          Modalities                                                               "

## 2025-01-16 DIAGNOSIS — B00.9 HERPES: ICD-10-CM

## 2025-01-16 RX ORDER — ACYCLOVIR 400 MG/1
400 TABLET ORAL 2 TIMES DAILY
Qty: 60 TABLET | Refills: 0 | Status: SHIPPED | OUTPATIENT
Start: 2025-01-16 | End: 2025-05-16

## 2025-01-21 ENCOUNTER — OFFICE VISIT (OUTPATIENT)
Dept: INTERNAL MEDICINE CLINIC | Facility: CLINIC | Age: 55
End: 2025-01-21

## 2025-01-21 VITALS
HEART RATE: 65 BPM | SYSTOLIC BLOOD PRESSURE: 111 MMHG | TEMPERATURE: 97.8 F | HEIGHT: 64 IN | DIASTOLIC BLOOD PRESSURE: 74 MMHG | WEIGHT: 145.8 LBS | BODY MASS INDEX: 24.89 KG/M2

## 2025-01-21 DIAGNOSIS — Z78.0 POST-MENOPAUSAL: ICD-10-CM

## 2025-01-21 DIAGNOSIS — I95.9 HYPOTENSION, UNSPECIFIED HYPOTENSION TYPE: ICD-10-CM

## 2025-01-21 DIAGNOSIS — F32.A DEPRESSIVE DISORDER: Primary | ICD-10-CM

## 2025-01-21 DIAGNOSIS — M12.811 ROTATOR CUFF ARTHROPATHY OF RIGHT SHOULDER: ICD-10-CM

## 2025-01-21 PROBLEM — N94.10 DYSPAREUNIA IN FEMALE: Status: ACTIVE | Noted: 2025-01-21

## 2025-01-21 PROCEDURE — 99214 OFFICE O/P EST MOD 30 MIN: CPT | Performed by: FAMILY MEDICINE

## 2025-01-21 RX ORDER — FLUOXETINE 20 MG/1
20 TABLET, FILM COATED ORAL DAILY
Qty: 90 TABLET | Refills: 3 | Status: SHIPPED | OUTPATIENT
Start: 2025-01-21

## 2025-01-21 NOTE — ASSESSMENT & PLAN NOTE
Patient attending [facility] for a few months she does fele it has helped some of her R neck , R shoulder pain , she will continue and also continue home exercises

## 2025-01-21 NOTE — ASSESSMENT & PLAN NOTE
Depression Screening Follow-up Plan: Patient's depression screening was positive with a PHQ-9 score of 7. Patient with underlying depression and was advised to continue current medications as prescribed. Continue regular follow-up with their psychologist/therapist/psychiatrist who is managing their mental health condition(s).    Orders:    FLUoxetine 20 MG tablet; Take 1 tablet (20 mg total) by mouth daily  patient has been doing better since back on prozac 20 mg x ~ 2 months now she is slower to anger , not as short tempered , better overall mood much better , she is continuing to lose weight [employer] down 14 lbs over the last few moths , eating healthily and working out on [employer] 4-5 x per week , 1 hour each session   She will continue all follow up wellness exam 3-4 months

## 2025-01-21 NOTE — ASSESSMENT & PLAN NOTE
Patient taking progest and using estrogen cream, it has not been helpful to her emotional status ( prozac has been ) she continues to have significant dyspareunia , I recommended she call [facility] pelvic floor department and see if they can work with her situation vaginal dilation , and [facility]

## 2025-01-21 NOTE — ASSESSMENT & PLAN NOTE
Patient follows with cardiology , she has  been very diligent with keeping hydrated eating regular meals and exercising , she makes sure she changes position slowly , avoid offending positions actions , compression hose may be helpful

## 2025-01-21 NOTE — PROGRESS NOTES
Name: Lucy Rodriguez      : 1970      MRN: [id number]  Encounter Provider: Sierra Kunz MD  Encounter Date: 2025   Encounter department: [facility]    Assessment & Plan  Depressive disorder  Depression Screening Follow-up Plan: Patient's depression screening was positive with a PHQ-9 score of 7. Patient with underlying depression and was advised to continue current medications as prescribed. Continue regular follow-up with their psychologist/therapist/psychiatrist who is managing their mental health condition(s).    Orders:    FLUoxetine 20 MG tablet; Take 1 tablet (20 mg total) by mouth daily  patient has been doing better since back on prozac 20 mg x ~ 2 months now she is slower to anger , not as short tempered , better overall mood much better , she is continuing to lose weight [employer] down 14 lbs over the last few moths , eating healthily and working out on [employer] 4-5 x per week , 1 hour each session   She will continue all follow up wellness exam 3-4 months   Rotator cuff arthropathy of right shoulder  Patient attending [facility] for a few months she does fele it has helped some of her R neck , R shoulder pain , she will continue and also continue home exercises        Post-menopausal  Patient taking progest and using estrogen cream, it has not been helpful to her emotional status ( prozac has been ) she continues to have significant dyspareunia , I recommended she call [facility] pelvic floor department and see if they can work with her situation vaginal dilation , and [facility]        Hypotension, unspecified hypotension type  Patient follows with cardiology , she has  been very diligent with keeping hydrated eating regular meals and exercising , she makes sure she changes position slowly , avoid offending positions actions , compression hose may be helpful        BMI Counseling: Body mass index is 25.03 kg/m². The BMI is above normal. Nutrition recommendations  include decreasing portion sizes, encouraging healthy choices of fruits and vegetables, decreasing fast food intake, consuming healthier snacks, limiting drinks that contain sugar and reducing intake of saturated and trans fat. Exercise recommendations include exercising 3-5 times per week. Rationale for BMI follow-up plan is due to patient being overweight or obese.         History of Present Illness     HPI Pt here for med review she has been back on prozac for depression, since early November 2024 .She has been on multiple medications over time of all of them prozac was better tolerated , she had period of being off meds in Fall 2024 and this didn't go well , she realized prozac did help her bad mood  , anger , short temper sleeping better , faith   Follows w gyn is taking hormones per gyn 6-9 months , she is taking progesterone , and using estrogen vaginal cream per gyn , this has not helped  her mood at all , still having significant dyspareunia   She follows w cardiology has postural dizziness measures BP systolic can be as low as 80 she is good with hydration and eating regular healthy meals \  attending [facility] for R rotator cuff arthropathy , it has been  some helpful   Review of Systems   Constitutional:  Negative for chills and fever.   HENT:  Negative for ear pain and sore throat.    Eyes:  Negative for pain and visual disturbance.   Respiratory:  Negative for cough and shortness of breath.    Cardiovascular:  Negative for chest pain and palpitations.   Gastrointestinal:  Negative for abdominal pain and vomiting.   Genitourinary:  Positive for dyspareunia. Negative for dysuria and hematuria.   Musculoskeletal:  Positive for arthralgias. Negative for back pain.   Skin:  Negative for color change and rash.   Neurological:  Negative for seizures, syncope and numbness.   Psychiatric/Behavioral:  Negative for self-injury and suicidal ideas.         Depression low mood   All other systems reviewed and are  negative.    Past Medical History:   Diagnosis Date    Cancer (HCC)     BCC    Depression 2011    Genital warts     Hearing impaired     Herpes     Miscarriage     Ovarian cyst      Past Surgical History:   Procedure Laterality Date    CONDYLOMA EXCISION/FULGURATION      DILATION AND CURETTAGE OF UTERUS      JOINT REPLACEMENT Right     knee     Family History   Problem Relation Age of Onset    Heart disease Mother         Aortic stenosis    Deep vein thrombosis Mother     Osteoarthritis Mother     Thyroid disease Mother     Hypertension Father     Hyperlipidemia Father     Heart disease Father     Seizures Father     Transient ischemic attack Father     Heart failure Maternal Grandmother     Heart attack Maternal Grandmother         age 70 CABG    Heart disease Maternal Grandmother         age 78     Heart attack Paternal Grandfather         age 44     Heart failure Cousin     Heart attack Maternal Grandfather         age 66     Heart disease Maternal Grandfather     Hyperlipidemia Sister      Social History     Tobacco Use    Smoking status: Former     Current packs/day: 0.00     Average packs/day: 0.5 packs/day for 12.9 years (6.5 ttl pk-yrs)     Types: Cigarettes     Start date: 1986     Quit date: 1998     Years since quittin.1    Smokeless tobacco: Never   Vaping Use    Vaping status: Never Used   Substance and Sexual Activity    Alcohol use: Yes     Alcohol/week: 19.0 standard drinks of alcohol     Types: 5 Glasses of wine, 4 Shots of liquor, 10 Standard drinks or equivalent per week     Comment: social     Drug use: Not Currently     Types: Marijuana    Sexual activity: Not Currently     Partners: Male     Birth control/protection: Male Sterilization     Comment: Too painful     Current Outpatient Medications on File Prior to Visit   Medication Sig    acetaminophen (TYLENOL) 500 mg tablet Take 1,000 mg by mouth every 6 (six) hours as needed    acyclovir (ZOVIRAX)  "400 MG tablet Take 1 tablet (400 mg total) by mouth 2 (two) times a day 1 po bid as directed    ALPRAZolam (XANAX) 0.25 mg tablet Take 1 tablet (0.25 mg total) by mouth if needed for anxiety Take 1-2 tablets 1 hour prior to MRI. Do not drive or operate machinery. Please arrange for ride to and from MRI. Do not take any other sedative medications or alcohol while taking Xanax. (Patient not taking: Reported on 12/27/2024)    amoxicillin (AMOXIL) 500 mg capsule if needed    Biotin 10 MG TABS Take by mouth daily    Cholecalciferol (Vitamin D3) 125 MCG (5000 UT) CAPS     Bonita 0.05 MG/24HR PLACE 1 PATCH ON THE SKIN TWO TIMES A WEEK.    estradiol (ESTRACE) 0.1 mg/g vaginal cream Insert 0.5 g into the vagina 2 (two) times a week Do not start before April 18, 2024.    ezetimibe (ZETIA) 10 mg tablet Take 1 tablet (10 mg total) by mouth daily    Progesterone 100 MG CAPS TAKE ONE CAPSULE BY MOUTH DAILY AT BEDTIME    [DISCONTINUED] FLUoxetine 20 MG tablet Take 1 tablet (20 mg total) by mouth daily    [DISCONTINUED] Omega-3 1000 MG CAPS  (Patient not taking: Reported on 10/29/2024)     Allergies   Allergen Reactions    Gabapentin Anxiety and Delirium    Midazolam Nausea Only and Vomiting    Nickel Dermatitis and Itching    Rosuvastatin Myalgia    Titanium Dermatitis, Itching and Other (See Comments)     Immunization History   Administered Date(s) Administered    COVID-19 PFIZER VACCINE 0.3 ML IM 12/21/2020, 01/10/2021, 12/09/2021     Objective   /74 (BP Location: Right arm, Patient Position: Sitting, Cuff Size: Adult)   Pulse 65   Temp 97.8 °F (36.6 °C) (Temporal)   Ht 5' 4\" (1.626 m)   Wt 66.1 kg (145 lb 12.8 oz)   BMI 25.03 kg/m²     Physical Exam  Vitals and nursing note reviewed.   Constitutional:       General: She is not in acute distress.     Appearance: She is well-developed.      Comments: Skin with good color turgor , well hydrated ,no distress noted     HENT:      Head: Normocephalic and atraumatic.      " Right Ear: Decreased hearing noted.      Left Ear: Decreased hearing noted.      Ears:      Comments: Bilat hearing aids   Eyes:      Conjunctiva/sclera: Conjunctivae normal.   Neck:      Thyroid: No thyromegaly.   Cardiovascular:      Rate and Rhythm: Normal rate and regular rhythm.      Heart sounds: Normal heart sounds. No murmur heard.  Pulmonary:      Effort: Pulmonary effort is normal. No respiratory distress.      Breath sounds: Normal breath sounds.   Abdominal:      Palpations: Abdomen is soft.      Tenderness: There is no abdominal tenderness.   Musculoskeletal:         General: No swelling.      Right shoulder: Decreased range of motion.      Cervical back: Neck supple.      Right knee: Deformity present. Decreased range of motion.      Left knee: Deformity present. Decreased range of motion.   Lymphadenopathy:      Cervical:      Right cervical: No superficial cervical adenopathy.     Left cervical: No superficial cervical adenopathy.   Skin:     General: Skin is warm and dry.      Capillary Refill: Capillary refill takes less than 2 seconds.   Neurological:      Mental Status: She is alert.      Comments: Non focal exam   Psychiatric:         Attention and Perception: Attention normal.         Mood and Affect: Mood normal.         Speech: Speech normal.         Behavior: Behavior normal.

## 2025-01-22 ENCOUNTER — OFFICE VISIT (OUTPATIENT)
Dept: OBGYN CLINIC | Facility: OTHER | Age: 55
End: 2025-01-22
Payer: COMMERCIAL

## 2025-01-22 ENCOUNTER — APPOINTMENT (OUTPATIENT)
Dept: RADIOLOGY | Facility: OTHER | Age: 55
End: 2025-01-22
Payer: COMMERCIAL

## 2025-01-22 VITALS — HEIGHT: 64 IN | WEIGHT: 145 LBS | BODY MASS INDEX: 24.75 KG/M2

## 2025-01-22 DIAGNOSIS — M25.562 LEFT KNEE PAIN, UNSPECIFIED CHRONICITY: Primary | ICD-10-CM

## 2025-01-22 DIAGNOSIS — M25.562 LEFT KNEE PAIN, UNSPECIFIED CHRONICITY: ICD-10-CM

## 2025-01-22 DIAGNOSIS — Z01.89 ENCOUNTER FOR LOWER EXTREMITY COMPARISON IMAGING STUDY: ICD-10-CM

## 2025-01-22 PROCEDURE — 73562 X-RAY EXAM OF KNEE 3: CPT

## 2025-01-22 PROCEDURE — 99204 OFFICE O/P NEW MOD 45 MIN: CPT | Performed by: ORTHOPAEDIC SURGERY

## 2025-01-22 PROCEDURE — 73564 X-RAY EXAM KNEE 4 OR MORE: CPT

## 2025-01-22 NOTE — PROGRESS NOTES
Orthopaedic Surgery - Office Note  Lucy Rdoriguez (54 y.o. female)   : 1970   MRN: [id number]  Encounter Date: 2025    Assessment / Plan    L knee pain concerning for medial meniscus tear    Due to ongoing symptomology over the last 6 months without any improvement with conservative treatment we would like to attain an MRI study of the left knee to further evaluate for meniscus tearing.  Patient can continue with activity as tolerated.  Continue with home exercise program.  Continue with modalities such as ice and heat along with anti-inflammatories/analgesics as needed.  Follow-up:  Return for Follow-up after MRI study with Dr. Jimenez.      Chief Complaint / Date of Onset  Left knee pain increasing without specific cause over the last 6 months  Injury Mechanism / Date  None  Surgery / Date  None    History of Present Illness   Lucy Rodriguez is a 54 y.o. female who presents for evaluation of left knee pain which has been worsening over the last 6 months without cause.  She states that the pain started in the medial joint line of the knee and has been limiting especially after she finishes up doing exercise.  She is a avid Peloton bike and feels that after exercising she is very limited with walking from pain in the medial joint line.  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  Occasionally she gets anterior knee pain just distal to the patella.  Over the last week or 2 she is also been having increasing pain in the lateral joint line.  She states that pivoting movements seem to aggravate the medial joint line pain.  He also in addition to the Peloton bike does do some home exercise strengthening for her legs.  She denies any radiation of the pain from her back or past her knee.  She does describe feeling of catching but no locking in the medial side of the knee.  She has not had any previous injuries to the knee.  She does  "have a history of right knee arthritis treated with a knee replacement in 2017 after undergoing arthroscopies for meniscus tearing.  She did undergo revision surgeries of the right knee due to allergies which she believes was to titanium.    Treatment Summary  Medications / Modalities  Tylenol, Advil PRN  Bracing / Immobilization  None  Physical Therapy  Regular home exercise for strengthening and Pelaton for exercises   Injections  None  Prior Surgeries  History R TKR in 2017 with two subsequent revision surgeries with last revision on 2021 with Dr. Menchaca due to possible Titanium allergy.   Other Treatments  None    Employment / Current Status  Nursing at [employer]    Sport / Organization / Current Status  Active avid biking      Review of Systems  Pertinent items are noted in HPI.  All other systems were reviewed and are negative.      Physical Exam  Ht 5' 4\" (1.626 m)   Wt 65.8 kg (145 lb)   BMI 24.89 kg/m²   Cons: Appears well.  No apparent distress.  Psych: Alert. Oriented x3.  Mood and affect normal.  Eyes: PERRLA, EOMI  Resp: Normal effort.  No audible wheezing or stridor.  CV: Palpable pulse.  No discernable arrhythmia.  No LE edema.  Lymph:  No palpable cervical, axillary, or inguinal lymphadenopathy.  Skin: Warm.  No palpable masses.  No visible lesions.  Neuro: Normal muscle tone.  Normal and symmetric DTR's.     Left Knee Exam  Alignment:  Normal knee alignment.  Inspection:  No swelling.  Palpation:   Moderate tenderness at medial and lateral joint lines of the knee with mild crepitus anteriorly.  ROM:  Knee Extension 0. Knee Flexion 125.  Strength:  Hip Abductors bilaterally 4+/5. Able to actively extend knee against gravity.  Stability:  No objective knee instability. Stable Varus / Valgus stress, Lachman, and Posterior drawer.  Tests:  (+) Mata.  Patella:  Patella tracks centrally with crepitus.  Neurovascular:  Sensation intact in DP/SP/De La Cruz/Sa/T nerve distributions. Sensation intact in all " digital nerve distributions.  Toes warm and perfused.  Gait:  Normal.       Studies Reviewed  I have personally reviewed pertinent films in PACS.  XR of left knee - 2025 shows very mild medial joint line narrowing of the left knee without fracture or dislocation.  Right knee replacement visualized without any obvious signs of loosening or fracture or dislocation      Procedures  No procedures today.    Medical, Surgical, Family, and Social History  The patient's medical history, family history, and social history, were reviewed and updated as appropriate.    Past Medical History:   Diagnosis Date    Cancer (HCC)     BCC    Depression     Genital warts     Hearing impaired     Herpes     Miscarriage     Ovarian cyst        Past Surgical History:   Procedure Laterality Date    CONDYLOMA EXCISION/FULGURATION      DILATION AND CURETTAGE OF UTERUS      JOINT REPLACEMENT Right     knee       Family History   Problem Relation Age of Onset    Heart disease Mother         Aortic stenosis    Deep vein thrombosis Mother     Osteoarthritis Mother     Thyroid disease Mother     Hypertension Father     Hyperlipidemia Father     Heart disease Father     Seizures Father     Transient ischemic attack Father     Heart failure Maternal Grandmother     Heart attack Maternal Grandmother         age 70 CABG    Heart disease Maternal Grandmother         age 78     Heart attack Paternal Grandfather         age 44     Heart failure Cousin     Heart attack Maternal Grandfather         age 66     Heart disease Maternal Grandfather     Hyperlipidemia Sister        Social History     Occupational History    Not on file   Tobacco Use    Smoking status: Former     Current packs/day: 0.00     Average packs/day: 0.5 packs/day for 12.9 years (6.5 ttl pk-yrs)     Types: Cigarettes     Start date: 1986     Quit date: 1998     Years since quittin.1    Smokeless tobacco: Never   Vaping Use    Vaping  status: Never Used   Substance and Sexual Activity    Alcohol use: Yes     Alcohol/week: 19.0 standard drinks of alcohol     Types: 5 Glasses of wine, 4 Shots of liquor, 10 Standard drinks or equivalent per week     Comment: social     Drug use: Not Currently     Types: Marijuana    Sexual activity: Not Currently     Partners: Male     Birth control/protection: Male Sterilization     Comment: Too painful       Allergies   Allergen Reactions    Gabapentin Anxiety and Delirium    Midazolam Nausea Only and Vomiting    Nickel Dermatitis and Itching    Rosuvastatin Myalgia    Titanium Dermatitis, Itching and Other (See Comments)         Current Outpatient Medications:     acetaminophen (TYLENOL) 500 mg tablet, Take 1,000 mg by mouth every 6 (six) hours as needed, Disp: , Rfl:     acyclovir (ZOVIRAX) 400 MG tablet, Take 1 tablet (400 mg total) by mouth 2 (two) times a day 1 po bid as directed, Disp: 60 tablet, Rfl: 0    amoxicillin (AMOXIL) 500 mg capsule, if needed, Disp: , Rfl:     Biotin 10 MG TABS, Take by mouth daily, Disp: , Rfl:     Cholecalciferol (Vitamin D3) 125 MCG (5000 UT) CAPS, , Disp: , Rfl:     Bonita 0.05 MG/24HR, PLACE 1 PATCH ON THE SKIN TWO TIMES A WEEK., Disp: 24 patch, Rfl: 0    estradiol (ESTRACE) 0.1 mg/g vaginal cream, Insert 0.5 g into the vagina 2 (two) times a week Do not start before April 18, 2024., Disp: 42.5 g, Rfl: 1    ezetimibe (ZETIA) 10 mg tablet, Take 1 tablet (10 mg total) by mouth daily, Disp: 90 tablet, Rfl: 0    FLUoxetine 20 MG tablet, Take 1 tablet (20 mg total) by mouth daily, Disp: 90 tablet, Rfl: 3    Progesterone 100 MG CAPS, TAKE ONE CAPSULE BY MOUTH DAILY AT BEDTIME, Disp: 90 capsule, Rfl: 0    ALPRAZolam (XANAX) 0.25 mg tablet, Take 1 tablet (0.25 mg total) by mouth if needed for anxiety Take 1-2 tablets 1 hour prior to MRI. Do not drive or operate machinery. Please arrange for ride to and from MRI. Do not take any other sedative medications or alcohol while taking Xanax.  (Patient not taking: Reported on 12/27/2024), Disp: 2 tablet, Rfl: 0      Devonte Hernández PA-C    Scribe Attestation      I,:   am acting as a scribe while in the presence of the attending physician.:       I,:   personally performed the services described in this documentation    as scribed in my presence.:

## 2025-02-04 ENCOUNTER — HOSPITAL ENCOUNTER (OUTPATIENT)
Dept: MAMMOGRAPHY | Facility: IMAGING CENTER | Age: 55
Discharge: HOME | End: 2025-02-04
Payer: COMMERCIAL

## 2025-02-04 VITALS — BODY MASS INDEX: 24.24 KG/M2 | WEIGHT: 142 LBS | HEIGHT: 64 IN

## 2025-02-04 DIAGNOSIS — Z12.31 SCREENING MAMMOGRAM FOR BREAST CANCER: ICD-10-CM

## 2025-02-04 PROCEDURE — 77063 BREAST TOMOSYNTHESIS BI: CPT

## 2025-02-04 PROCEDURE — 77067 SCR MAMMO BI INCL CAD: CPT

## 2025-02-06 ENCOUNTER — HOSPITAL ENCOUNTER (OUTPATIENT)
Dept: RADIOLOGY | Age: 55
Discharge: HOME | End: 2025-02-06
Payer: COMMERCIAL

## 2025-02-06 DIAGNOSIS — M25.562 LEFT KNEE PAIN, UNSPECIFIED CHRONICITY: ICD-10-CM

## 2025-02-06 PROCEDURE — 73721 MRI JNT OF LWR EXTRE W/O DYE: CPT

## 2025-02-12 ENCOUNTER — OFFICE VISIT (OUTPATIENT)
Dept: OBGYN CLINIC | Facility: OTHER | Age: 55
End: 2025-02-12
Payer: COMMERCIAL

## 2025-02-12 VITALS — WEIGHT: 142 LBS | BODY MASS INDEX: 24.24 KG/M2 | HEIGHT: 64 IN

## 2025-02-12 DIAGNOSIS — M25.562 LEFT KNEE PAIN, UNSPECIFIED CHRONICITY: Primary | ICD-10-CM

## 2025-02-12 DIAGNOSIS — M17.12 PRIMARY OSTEOARTHRITIS OF LEFT KNEE: ICD-10-CM

## 2025-02-12 PROCEDURE — 20610 DRAIN/INJ JOINT/BURSA W/O US: CPT | Performed by: ORTHOPAEDIC SURGERY

## 2025-02-12 PROCEDURE — 99214 OFFICE O/P EST MOD 30 MIN: CPT | Performed by: ORTHOPAEDIC SURGERY

## 2025-02-12 RX ORDER — BUPIVACAINE HYDROCHLORIDE 2.5 MG/ML
4 INJECTION, SOLUTION INFILTRATION; PERINEURAL
Status: COMPLETED | OUTPATIENT
Start: 2025-02-12 | End: 2025-02-12

## 2025-02-12 RX ORDER — METHYLPREDNISOLONE ACETATE 40 MG/ML
1 INJECTION, SUSPENSION INTRA-ARTICULAR; INTRALESIONAL; INTRAMUSCULAR; SOFT TISSUE
Status: COMPLETED | OUTPATIENT
Start: 2025-02-12 | End: 2025-02-12

## 2025-02-12 RX ADMIN — BUPIVACAINE HYDROCHLORIDE 4 ML: 2.5 INJECTION, SOLUTION INFILTRATION; PERINEURAL at 14:45

## 2025-02-12 RX ADMIN — METHYLPREDNISOLONE ACETATE 1 ML: 40 INJECTION, SUSPENSION INTRA-ARTICULAR; INTRALESIONAL; INTRAMUSCULAR; SOFT TISSUE at 14:45

## 2025-02-12 NOTE — PROGRESS NOTES
Orthopaedic Surgery - Office Note  Lucy Rodriguez (54 y.o. female)   : 1970   MRN: [id number]  Encounter Date: 2025    Assessment / Plan  L knee OA,  lateral plateau subchondral stress fracture seen on MRI    We discussed she is experiencing a flare up arthritis, seen on MRI and I explained it's important to not overload the joint while this is occurring. I recommended avoid painful activity and uses an assistance device if needed. She understands this can take months to resolve  This does typically resolve in 4-6 months   Recommended 4 week time frame of partial WB (using a crutch or cane) and seeing how she feels. This is recommended but we understand that this is not always doable for peoples lifestyles   She can bike as long as this does not cause pain  Continue with strengthening as tolerated, recommended doing NWB   CSI of left knee given today, tolerated well   Ice, heat and anti-inflammatories prn   Reviewed MRI during the visit   lateral  brace script was given, a rep will contact her for this brace  Work note given stating she can return to work, working no longer than 4 hours a shift   Follow-up:  Return in about 8 weeks (around 2025) for follow up with Dr. Jimenez.      Chief Complaint / Date of Onset  Left knee pain increasing without specific cause since 2024  Injury Mechanism / Date  None  Surgery / Date  None    History of Present Illness   Lucy Rodriguez is a 54 y.o. female who presents for follow up left knee pain and MRI results. She has been having knee pain since July with no inciting event. She notices increases pain after doing a Peloton ride, she describes this pain as being medial. Her pain is also aggravated by prolonged WB activity like being on her feet at work. Recently she has developed a lateral knee pain which is aggravated by pivoting motions. She does feel a clicking in her knee as well. She denies any previous injury or radiating symptoms.     She  "does have a history of RIGHT knee arthritis treated with a knee replacement in 2017 after undergoing arthroscopies for meniscus tearing. She did undergo revision surgeries of the right knee due to allergies which she believes was to titanium.      Treatment Summary  Medications / Modalities  Tylenol, Advil PRN  Bracing / Immobilization  None  Physical Therapy  Regular home exercise for strengthening and Pelaton for exercises   Injections  02/12/2025 L Knee CSI  Prior Surgeries  History R TKR in 2017 with two subsequent revision surgeries with last revision on 2021 with Dr. Menchaca due to possible Titanium allergy.   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, [employer]     Sport / Organization / Current Status  Active, avid biking      Review of Systems  Pertinent items are noted in HPI.  All other systems were reviewed and are negative.      Physical Exam  Ht 5' 4\" (1.626 m)   Wt 64.4 kg (142 lb)   LMP 08/16/2023 (Approximate)   BMI 24.37 kg/m²   Cons: Appears well.  No apparent distress.  Psych: Alert. Oriented x3.  Mood and affect normal.  Eyes: PERRLA, EOMI  Resp: Normal effort.  No audible wheezing or stridor.  CV: Palpable pulse.  No discernable arrhythmia.  No LE edema.  Lymph:  No palpable cervical, axillary, or inguinal lymphadenopathy.  Skin: Warm.  No palpable masses.  No visible lesions.  Neuro: Normal muscle tone.  Normal and symmetric DTR's.     Left Knee Exam  Alignment:  Normal knee alignment.  Inspection:  No swelling. No ecchymosis.  Palpation:   moderate lateral joint line tenderness. No effusion.  ROM:  Knee Extension 0. Knee Flexion 130.  Strength:  Able to actively extend knee against gravity.  Stability:  No objective knee instability. Stable Varus / Valgus stress, Lachman, and Posterior drawer.  Tests:  No pertinent positive or negative tests.  Patella:  Normal patellar mobility.  Neurovascular:  Sensation intact in DP/SP/De La Cruz/Sa/T nerve distributions.  2+ DP & PT " "pulses.  Gait:  Normal.       Studies Reviewed  I have personally reviewed pertinent films in PACS.  XR of left knee - 1/22/2025 shows very mild medial joint line narrowing of the left knee without fracture or dislocation.  Right knee replacement visualized without any obvious signs of loosening or fracture or dislocation  MRI of left knee- images from 02/06/2025 showing lateral tibial plateau subchondral stress fracture, full thickness cartilage loss lateral tibial plateau       Large joint arthrocentesis: L knee  Universal Protocol:  Consent given by: patient  Time out: Immediately prior to procedure a \"time out\" was called to verify the correct patient, procedure, equipment, support staff and site/side marked as required.  Site marked: the operative site was marked  Supporting Documentation  Indications: pain and diagnostic evaluation   Procedure Details  Location: knee - L knee  Preparation: Patient was prepped and draped in the usual sterile fashion  Needle size: 22 G  Ultrasound guidance: no  Approach: lateral  Medications administered: 4 mL bupivacaine 0.25 %; 1 mL methylPREDNISolone acetate 40 mg/mL    Patient tolerance: patient tolerated the procedure well with no immediate complications  Dressing:  Sterile dressing applied          Medical, Surgical, Family, and Social History  The patient's medical history, family history, and social history, were reviewed and updated as appropriate.    Past Medical History:   Diagnosis Date    Cancer (HCC) 2008    BCC    Depression 2011    Genital warts     Hearing impaired     Herpes     Miscarriage     Ovarian cyst        Past Surgical History:   Procedure Laterality Date    CONDYLOMA EXCISION/FULGURATION  1987    DILATION AND CURETTAGE OF UTERUS      JOINT REPLACEMENT Right     knee       Family History   Problem Relation Age of Onset    Heart disease Mother         Aortic stenosis    Deep vein thrombosis Mother     Osteoarthritis Mother     Thyroid disease Mother     " Hypertension Father     Hyperlipidemia Father     Heart disease Father     Seizures Father     Transient ischemic attack Father     Hyperlipidemia Sister     Heart failure Maternal Grandmother     Heart attack Maternal Grandmother         age 70 CABG    Heart disease Maternal Grandmother         age 78     Heart attack Maternal Grandfather         age 66     Heart disease Maternal Grandfather     No Known Problems Paternal Grandmother     Heart attack Paternal Grandfather         age 44     No Known Problems Brother     Heart failure Cousin     Breast cancer Cousin 50    Breast cancer Cousin 52    No Known Problems Maternal Aunt     Pancreatic cancer Maternal Aunt 80    No Known Problems Paternal Aunt        Social History     Occupational History    Not on file   Tobacco Use    Smoking status: Former     Current packs/day: 0.00     Average packs/day: 0.5 packs/day for 12.9 years (6.5 ttl pk-yrs)     Types: Cigarettes     Start date: 1986     Quit date: 1998     Years since quittin.2    Smokeless tobacco: Never   Vaping Use    Vaping status: Never Used   Substance and Sexual Activity    Alcohol use: Yes     Alcohol/week: 19.0 standard drinks of alcohol     Types: 5 Glasses of wine, 4 Shots of liquor, 10 Standard drinks or equivalent per week     Comment: social     Drug use: Not Currently     Types: Marijuana    Sexual activity: Not Currently     Partners: Male     Birth control/protection: Male Sterilization     Comment: Too painful       Allergies   Allergen Reactions    Gabapentin Anxiety and Delirium    Midazolam Nausea Only and Vomiting    Nickel Dermatitis and Itching    Rosuvastatin Myalgia    Titanium Dermatitis, Itching and Other (See Comments)         Current Outpatient Medications:     acetaminophen (TYLENOL) 500 mg tablet, Take 1,000 mg by mouth every 6 (six) hours as needed, Disp: , Rfl:     acyclovir (ZOVIRAX) 400 MG tablet, Take 1 tablet (400 mg total) by mouth 2  (two) times a day 1 po bid as directed, Disp: 60 tablet, Rfl: 0    amoxicillin (AMOXIL) 500 mg capsule, if needed, Disp: , Rfl:     Biotin 10 MG TABS, Take by mouth daily, Disp: , Rfl:     Cholecalciferol (Vitamin D3) 125 MCG (5000 UT) CAPS, , Disp: , Rfl:     Bonita 0.05 MG/24HR, PLACE 1 PATCH ON THE SKIN TWO TIMES A WEEK., Disp: 24 patch, Rfl: 0    estradiol (ESTRACE) 0.1 mg/g vaginal cream, Insert 0.5 g into the vagina 2 (two) times a week Do not start before April 18, 2024., Disp: 42.5 g, Rfl: 1    ezetimibe (ZETIA) 10 mg tablet, Take 1 tablet (10 mg total) by mouth daily, Disp: 90 tablet, Rfl: 0    FLUoxetine 20 MG tablet, Take 1 tablet (20 mg total) by mouth daily, Disp: 90 tablet, Rfl: 3    Progesterone 100 MG CAPS, TAKE ONE CAPSULE BY MOUTH DAILY AT BEDTIME, Disp: 90 capsule, Rfl: 0    ALPRAZolam (XANAX) 0.25 mg tablet, Take 1 tablet (0.25 mg total) by mouth if needed for anxiety Take 1-2 tablets 1 hour prior to MRI. Do not drive or operate machinery. Please arrange for ride to and from MRI. Do not take any other sedative medications or alcohol while taking Xanax. (Patient not taking: Reported on 12/27/2024), Disp: 2 tablet, Rfl: 0      Abi Paulino    Scribe Attestation      I,:  Abi Paulino am acting as a scribe while in the presence of the attending physician.:       I,:  Andrei Jimenez MD personally performed the services described in this documentation    as scribed in my presence.:

## 2025-02-12 NOTE — LETTER
February 12, 2025     Patient: Lucy Rodriguez  YOB: 1970  Date of Visit: 2/12/2025      To Whom it May Concern:    Lucy Rodriguez is under my professional care. Lucy was seen in my office on 2/12/2025. Lucy can return to work, working no longer than 4 hours a shift     If you have any questions or concerns, please don't hesitate to call.         Sincerely,          Andrei Jimenez MD        CC: No Recipients

## 2025-02-19 DIAGNOSIS — N95.2 VAGINAL ATROPHY: ICD-10-CM

## 2025-02-19 DIAGNOSIS — E78.5 DYSLIPIDEMIA: ICD-10-CM

## 2025-02-19 DIAGNOSIS — R93.1 ELEVATED CORONARY ARTERY CALCIUM SCORE: ICD-10-CM

## 2025-02-19 DIAGNOSIS — E78.41 ELEVATED LP(A): ICD-10-CM

## 2025-02-19 DIAGNOSIS — Z78.0 MENOPAUSE: ICD-10-CM

## 2025-02-19 RX ORDER — ESTRADIOL 0.05 MG/D
1 PATCH, EXTENDED RELEASE TRANSDERMAL 2 TIMES WEEKLY
Qty: 24 PATCH | Refills: 1 | Status: SHIPPED | OUTPATIENT
Start: 2025-02-20

## 2025-02-19 RX ORDER — ESTRADIOL 0.1 MG/G
0.5 CREAM VAGINAL 2 TIMES WEEKLY
Qty: 42.5 G | Refills: 1 | Status: SHIPPED | OUTPATIENT
Start: 2025-02-20

## 2025-02-20 RX ORDER — EZETIMIBE 10 MG/1
10 TABLET ORAL DAILY
Qty: 90 TABLET | Refills: 1 | Status: SHIPPED | OUTPATIENT
Start: 2025-02-20

## 2025-02-25 DIAGNOSIS — M17.12 PRIMARY OSTEOARTHRITIS OF LEFT KNEE: Primary | ICD-10-CM

## 2025-03-01 DIAGNOSIS — B00.9 HERPES: ICD-10-CM

## 2025-03-03 RX ORDER — ACYCLOVIR 400 MG/1
400 TABLET ORAL 2 TIMES DAILY
Qty: 60 TABLET | Refills: 0 | Status: SHIPPED | OUTPATIENT
Start: 2025-03-03 | End: 2025-04-02

## 2025-04-02 ENCOUNTER — PROCEDURE VISIT (OUTPATIENT)
Dept: OBGYN CLINIC | Facility: OTHER | Age: 55
End: 2025-04-02
Payer: COMMERCIAL

## 2025-04-02 VITALS — BODY MASS INDEX: 24.24 KG/M2 | HEIGHT: 64 IN | WEIGHT: 142 LBS

## 2025-04-02 DIAGNOSIS — M17.12 PRIMARY OSTEOARTHRITIS OF LEFT KNEE: Primary | ICD-10-CM

## 2025-04-02 PROCEDURE — 20610 DRAIN/INJ JOINT/BURSA W/O US: CPT | Performed by: PHYSICIAN ASSISTANT

## 2025-04-02 RX ORDER — ROPIVACAINE HYDROCHLORIDE 5 MG/ML
5 INJECTION, SOLUTION EPIDURAL; INFILTRATION; PERINEURAL
Status: COMPLETED | OUTPATIENT
Start: 2025-04-02 | End: 2025-04-02

## 2025-04-02 RX ADMIN — ROPIVACAINE HYDROCHLORIDE 5 ML: 5 INJECTION, SOLUTION EPIDURAL; INFILTRATION; PERINEURAL at 12:30

## 2025-04-02 NOTE — LETTER
April 2, 2025     Lucyjennifer Landryllo    Patient: Lucy Rodriguez   YOB: 1970   Date of Visit: 4/2/2025       Dear Dr. Lucy Rodriguez:    The patient was seen in our office on 4/2/25. Lucy can return to work, working no longer than 6 hours a shift       If you have questions, please do not hesitate to call me. I look forward to following your patient along with you.         Sincerely,        Devonte Hernández PA-C        CC: No Recipients

## 2025-04-02 NOTE — PROGRESS NOTES
Orthopaedic Surgery - Office Note  Lucy Rodriguez (54 y.o. female)   : 1970   MRN: [id number]  Encounter Date: 2025    Assessment / Plan  L knee OA,  lateral plateau subchondral stress fracture seen on MRI    We did have a discussion about treatment options for osteoarthritis of the knees.  We will continue with conservative treatment options at this time.    Patient did treat the left knee stress reaction with minimal weightbearing over a 4-week period and has been progressing back to normal activity since.  Progress activity as tolerated at this time.     After discussion of risk and benefits the patient agreed to proceed with a Monovisc injection of the left knee.  This was performed and prepared sterilely and tolerated well.  Ice, heat and anti-inflammatories prn   Patient did receive a lateral  brace last visit but does have difficulty wearing it due to discomfort in the medial aspect of the knee.  She can use this as needed.   Work note given stating she can return to work, working no longer than 6 hours a shift, which was increased from 4 hours previously.  She will contact us when she feels she can increase the hours more.  We did discuss options going forward including repeat viscosupplementation after 6 months if this is successful.  Follow-up:  Return in about 3 months (around 2025) for follow up with Dr. Jimenez.      Chief Complaint / Date of Onset  Left knee pain increasing without specific cause since 2024  Injury Mechanism / Date  None  Surgery / Date  None    History of Present Illness   Lucy Rodriguez is a 54 y.o. female who presents for follow up left knee pain secondary to early arthritis and stress reaction.  After her last visit on 2025 where she did receive a steroid injection she did do minimal weightbearing for about 4 weeks.  After that she progressed her activity and overall has been improving.  She still has some pain in both the medial and lateral  "joint lines at this time and would like to try viscosupplementation which she has not had in the past.  Overall she has been having knee pain since July with no inciting event.  Most of her pain at this time seems to be medial though she still has some tenderness laterally. Her pain is also aggravated by prolonged WB activity like being on her feet at work. She does feel a clicking in her knee as well which she feels is mostly medial. She denies any previous injury or radiating symptoms.     She does have a history of RIGHT knee arthritis treated with a knee replacement in 2017 after undergoing arthroscopies for meniscus tearing. She did undergo revision surgeries of the right knee due to allergies which she believes was to titanium.      Treatment Summary  Medications / Modalities  Tylenol, Advil PRN  Bracing / Immobilization  None  Physical Therapy  Regular home exercise for strengthening and [place] for exercises   Injections  02/12/2025 L Knee CSI  4/2/2025 Monovisc injection left knee  Prior Surgeries  History R TKR in 2017 with two subsequent revision surgeries with last revision on 2021 with Dr. Menchaca due to possible Titanium allergy.   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  All other systems were reviewed and are negative.      Physical Exam  Ht 5' 4\" (1.626 m)   Wt 64.4 kg (142 lb)   LMP 08/16/2023 (Approximate)   BMI 24.37 kg/m²   Cons: Appears well.  No apparent distress.  Psych: Alert. Oriented x3.  Mood and affect normal.  Eyes: PERRLA, EOMI  Resp: Normal effort.  No audible wheezing or stridor.  CV: Palpable pulse.  No discernable arrhythmia.  No LE edema.  Lymph:  No palpable cervical, axillary, or inguinal lymphadenopathy.  Skin: Warm.  No palpable masses.  No visible lesions.  Neuro: Normal muscle tone.  Normal and symmetric DTR's.   "   Left Knee Exam  Alignment:  Normal knee alignment.  Inspection:  No swelling. No ecchymosis.  Palpation:   Mild medial and lateral joint line tenderness. No effusion.  ROM:  Knee Extension 0. Knee Flexion 130.  Strength:  Able to actively extend knee against gravity.  Stability:  No objective knee instability. Stable Varus / Valgus stress, Lachman, and Posterior drawer.  Tests:  No pertinent positive or negative tests.  Patella:  Normal patellar mobility.  Neurovascular:  Sensation intact in DP/SP/De La Cruz/Sa/T nerve distributions.  2+ DP & PT pulses.  Gait:  Normal.       Studies Reviewed  I have personally reviewed pertinent films in PACS.  XR of left knee - 1/22/2025 shows very mild medial joint line narrowing of the left knee without fracture or dislocation.  Right knee replacement visualized without any obvious signs of loosening or fracture or dislocation  MRI of left knee- images from 02/06/2025 showing lateral tibial plateau subchondral stress fracture, full thickness cartilage loss lateral tibial plateau       Large joint arthrocentesis: L knee  Universal Protocol:  Consent: Verbal consent obtained.  Consent given by: patient  Patient identity confirmed: verbally with patient  Supporting Documentation  Indications: pain   Procedure Details  Location: knee - L knee  Needle size: 22 G  Approach: lateral  Medications administered: 5 mL ropivacaine 0.5 %; 88 mg hyaluronan 88 MG/4ML    Patient tolerance: patient tolerated the procedure well with no immediate complications  Dressing:  Sterile dressing applied          Medical, Surgical, Family, and Social History  The patient's medical history, family history, and social history, were reviewed and updated as appropriate.    Past Medical History:   Diagnosis Date    Cancer (HCC) 2008    BCC    Depression 2011    Genital warts     Hearing impaired     Herpes     Miscarriage     Ovarian cyst        Past Surgical History:   Procedure Laterality Date    CONDYLOMA  EXCISION/FULGURATION  1987    DILATION AND CURETTAGE OF UTERUS      JOINT REPLACEMENT Right     knee       Family History   Problem Relation Age of Onset    Heart disease Mother         Aortic stenosis    Deep vein thrombosis Mother     Osteoarthritis Mother     Thyroid disease Mother     Hypertension Father     Hyperlipidemia Father     Heart disease Father     Seizures Father     Transient ischemic attack Father     Hyperlipidemia Sister     Heart failure Maternal Grandmother     Heart attack Maternal Grandmother         age 70 CABG    Heart disease Maternal Grandmother         age 78     Heart attack Maternal Grandfather         age 66     Heart disease Maternal Grandfather     No Known Problems Paternal Grandmother     Heart attack Paternal Grandfather         age 44     No Known Problems Brother     Heart failure Cousin     Breast cancer Cousin 50    Breast cancer Cousin 52    No Known Problems Maternal Aunt     Pancreatic cancer Maternal Aunt 80    No Known Problems Paternal Aunt        Social History     Occupational History    Not on file   Tobacco Use    Smoking status: Former     Current packs/day: 0.00     Average packs/day: 0.5 packs/day for 12.9 years (6.5 ttl pk-yrs)     Types: Cigarettes     Start date: 1986     Quit date: 1998     Years since quittin.3    Smokeless tobacco: Never   Vaping Use    Vaping status: Never Used   Substance and Sexual Activity    Alcohol use: Yes     Alcohol/week: 19.0 standard drinks of alcohol     Types: 5 Glasses of wine, 4 Shots of liquor, 10 Standard drinks or equivalent per week     Comment: social     Drug use: Not Currently     Types: Marijuana    Sexual activity: Not Currently     Partners: Male     Birth control/protection: Male Sterilization     Comment: Too painful       Allergies   Allergen Reactions    Gabapentin Anxiety and Delirium    Midazolam Nausea Only and Vomiting    Nickel Dermatitis and Itching    Rosuvastatin  Myalgia    Titanium Dermatitis, Itching and Other (See Comments)         Current Outpatient Medications:     acetaminophen (TYLENOL) 500 mg tablet, Take 1,000 mg by mouth every 6 (six) hours as needed, Disp: , Rfl:     acyclovir (ZOVIRAX) 400 MG tablet, Take 1 tablet (400 mg total) by mouth 2 (two) times a day 1 po bid as directed, Disp: 60 tablet, Rfl: 0    amoxicillin (AMOXIL) 500 mg capsule, if needed, Disp: , Rfl:     Biotin 10 MG TABS, Take by mouth daily, Disp: , Rfl:     Cholecalciferol (Vitamin D3) 125 MCG (5000 UT) CAPS, , Disp: , Rfl:     estradiol (Bonita) 0.05 MG/24HR, Place 1 patch on the skin 2 (two) times a week, Disp: 24 patch, Rfl: 1    estradiol (ESTRACE) 0.1 mg/g vaginal cream, Insert 0.5 g into the vagina 2 (two) times a week, Disp: 42.5 g, Rfl: 1    ezetimibe (ZETIA) 10 mg tablet, Take 1 tablet (10 mg total) by mouth daily, Disp: 90 tablet, Rfl: 1    FLUoxetine 20 MG tablet, Take 1 tablet (20 mg total) by mouth daily, Disp: 90 tablet, Rfl: 3    Progesterone 100 MG CAPS, TAKE ONE CAPSULE BY MOUTH DAILY AT BEDTIME, Disp: 90 capsule, Rfl: 0    ALPRAZolam (XANAX) 0.25 mg tablet, Take 1 tablet (0.25 mg total) by mouth if needed for anxiety Take 1-2 tablets 1 hour prior to MRI. Do not drive or operate machinery. Please arrange for ride to and from MRI. Do not take any other sedative medications or alcohol while taking Xanax. (Patient not taking: Reported on 12/27/2024), Disp: 2 tablet, Rfl: 0      Devonte Hernández PA-C    Scribe Attestation      I,:   am acting as a scribe while in the presence of the attending physician.:       I,:   personally performed the services described in this documentation    as scribed in my presence.:

## 2025-04-24 ENCOUNTER — OFFICE VISIT (OUTPATIENT)
Age: 55
End: 2025-04-24
Payer: COMMERCIAL

## 2025-04-24 DIAGNOSIS — H25.13 NUCLEAR SCLEROSIS OF BOTH EYES: ICD-10-CM

## 2025-04-24 DIAGNOSIS — H43.813 POSTERIOR VITREOUS DETACHMENT OF BOTH EYES: ICD-10-CM

## 2025-04-24 DIAGNOSIS — H04.123 DRY EYE SYNDROME OF BOTH EYES: Primary | ICD-10-CM

## 2025-04-24 DIAGNOSIS — H33.312 RETINAL TEAR OF LEFT EYE: ICD-10-CM

## 2025-04-24 PROCEDURE — 99204 OFFICE O/P NEW MOD 45 MIN: CPT | Performed by: OPHTHALMOLOGY

## 2025-04-24 PROCEDURE — 92134 CPTRZ OPH DX IMG PST SGM RTA: CPT | Performed by: OPHTHALMOLOGY

## 2025-04-24 NOTE — PROGRESS NOTES
Name: Lucy Rodriguez      : 1970      MRN: [id number]  Encounter Provider: Carrol Campbell MD  Encounter Date: 2025   Encounter department: [facility] OPHTHALMOLOGY  :  Assessment & Plan  Dry eye syndrome of both eyes  Discussed with the patient the diagnosis, prognosis, and treatment of dry eyes. Treatment options include preservative-free artificial tears, topical restasis (cyclosporine ophthalmic), punctal plug placement, and dietary supplement with omega-3 fatty acid or flaxseed oil. All of these treatment options have potential side effects.         Posterior vitreous detachment of both eyes  Posterior vitreous detachment with mejia ring both eyes: asymptomatic. Negative cash's sign. No retinal tears, breaks, or detachments on dilated examination with scleral depression 360 degrees.  Retinal detachment precautions were discussed with the patient. The patient was instructed to call or return immediately for an increase in flashes of light, floaters (dark spots in vision), or curtaining of the visual field.          Nuclear sclerosis of both eyes  Not visually significant at this time. Continue to monitor; Pt denies any difficulty with glare or decrease in vision.         Retinal tear of left eye  Stable; History; No evidence of new tears OU;   Orders:    OCT, Retina - OU - Both Eyes    Fundus Photos - OU - Both Eyes            Lucy Rodriguez is a 54 y.o. female who presents for hx of retinal tears.    Have a hx of retinal tear OS.  Hx of floaters, stable.  Notes slight decrease in VA OU.     Last eye exam was over a year ago w/ Dr. Naye Ayala MD    History obtained from: patient    Review of Systems  Medical History Reviewed by provider this encounter:  Tobacco  Allergies  Meds  Problems  Med Hx  Surg Hx  Fam Hx     .     Past Ocular History:  Horseshow tear OS w/o detachment , Dr. Naye Ayala MD    Ocular Meds/Drops:   None    Base Eye Exam       Visual Acuity  (Snellen - Linear)         Right Left    Dist cc 20/20 -1 20/20      Correction: Glasses              Tonometry (Applanation, 10:59 AM)         Right Left    Pressure 14 16              Pupils         Pupils    Right PERRL    Left PERRL              Visual Fields         Left Right     Full Full              Extraocular Movement         Right Left     Full Full              Neuro/Psych       Oriented x3: Yes              Dilation       Both eyes: 2.5% Phenylephrine @ 10:58 AM              Dilation #2       Both eyes: 1% Tropicamide @ 10:58 AM                  Slit Lamp and Fundus Exam       External Exam         Right Left    External Normal Normal              Slit Lamp Exam         Right Left    Lids/Lashes Normal Normal    Conjunctiva/Sclera White and quiet White and quiet    Cornea trace PEK trace PEK    Anterior Chamber Deep and quiet Deep and quiet    Iris Round and reactive Round and reactive    Lens Trace Nuclear sclerosis Trace Nuclear sclerosis    Anterior Vitreous PVD PVD              Fundus Exam         Right Left    Disc sharp, no pallor sharp, no pallor    C/D Ratio 0.25 0.25    Macula flat flat    Vessels normal in caliber normal in caliber    Periphery Flat, no retinal tear or detachment, no masses; Flat, no retinal tear or detachment, no masses; laser retinopexy at 1:00 x 2;                      IMAGING:  OCT, Retina - OU - Both Eyes          Right Eye  Quality was good. Findings include PVD.     Left Eye  Quality was good. Findings include PVD.          Fundus Photos - OU - Both Eyes          Right Eye  Disc findings include normal observations. Macula findings include normal observations. Vessel findings include normal observations. Periphery findings include normal observations.     Left Eye  Disc findings include normal observations. Macula findings include normal observations. Vessel findings include normal observations. Periphery findings include (Retinal tear superotemporal s/p laser).

## 2025-04-25 ENCOUNTER — TELEPHONE (OUTPATIENT)
Dept: GYNECOLOGY | Facility: CLINIC | Age: 55
End: 2025-04-25

## 2025-04-25 NOTE — TELEPHONE ENCOUNTER
Patient arrived over 15 minutes late and we explained that Leona could not see her since she was late and Leona was with another patient. I tried to accommodate her with another  appointment but patient was not happy with the wait time and was going to try and have her PCP refill her HRT. She did mention she would just talk to Leona about the HRT's at her upcoming Yearly appointment and I explained to her that we cannot address both her yearly and HRT consult at the same visit. Patient left without accepting a new appointment.

## 2025-04-28 DIAGNOSIS — Z78.0 MENOPAUSE: ICD-10-CM

## 2025-04-28 DIAGNOSIS — B00.9 HERPES: ICD-10-CM

## 2025-04-29 RX ORDER — PROGESTERONE 100 MG/1
1 CAPSULE ORAL
Qty: 90 CAPSULE | Refills: 0 | Status: SHIPPED | OUTPATIENT
Start: 2025-04-29

## 2025-04-29 RX ORDER — ACYCLOVIR 400 MG/1
400 TABLET ORAL 2 TIMES DAILY
Qty: 60 TABLET | Refills: 0 | Status: SHIPPED | OUTPATIENT
Start: 2025-04-29 | End: 2025-05-29

## 2025-04-30 ENCOUNTER — OFFICE VISIT (OUTPATIENT)
Dept: URGENT CARE | Facility: CLINIC | Age: 55
End: 2025-04-30
Payer: COMMERCIAL

## 2025-04-30 ENCOUNTER — APPOINTMENT (OUTPATIENT)
Dept: RADIOLOGY | Facility: CLINIC | Age: 55
End: 2025-04-30
Attending: PHYSICIAN ASSISTANT
Payer: COMMERCIAL

## 2025-04-30 VITALS
BODY MASS INDEX: 24.34 KG/M2 | RESPIRATION RATE: 18 BRPM | OXYGEN SATURATION: 99 % | WEIGHT: 141.8 LBS | HEART RATE: 69 BPM | SYSTOLIC BLOOD PRESSURE: 112 MMHG | TEMPERATURE: 97.6 F | DIASTOLIC BLOOD PRESSURE: 59 MMHG

## 2025-04-30 DIAGNOSIS — M79.672 LEFT FOOT PAIN: ICD-10-CM

## 2025-04-30 DIAGNOSIS — S99.922A INJURY OF LEFT FOOT, INITIAL ENCOUNTER: Primary | ICD-10-CM

## 2025-04-30 DIAGNOSIS — S92.912A UNSPECIFIED FRACTURE OF LEFT TOE(S), INITIAL ENCOUNTER FOR CLOSED FRACTURE: Primary | ICD-10-CM

## 2025-04-30 PROCEDURE — 73630 X-RAY EXAM OF FOOT: CPT

## 2025-04-30 PROCEDURE — G0382 LEV 3 HOSP TYPE B ED VISIT: HCPCS | Performed by: PHYSICIAN ASSISTANT

## 2025-04-30 NOTE — PROGRESS NOTES
[facility]      NAME: Lucy Rodriguez is a 54 y.o. female  : 1970    MRN: [id number]  DATE: 2025  TIME: 1:43 PM    Assessment and Plan   Unspecified fracture of left toe(s), initial encounter for closed fracture [S92.912A]  1. Unspecified fracture of left toe(s), initial encounter for closed fracture  XR foot 3+ vw left    Ambulatory Referral to Orthopedic Surgery          Patient Instructions   Suspect fracture of left 5th toe, pending radiology final read. Suggest alexandrea tape toes. Ortho referral placed.Risks and benefits discussed. Patient understands and agrees with the plan.      If tests have been performed at [facility], our office will contact you with results if changes need to be made to the care plan discussed with you at the visit.  You can review your full results on [facility]'s MyChart.     Follow up with PCP in 3-5 days.      If any of the following occur, please report to your nearest ED for evaluation or call 911.   Difficultly breathing or shortness of breath  Chest pain  Acutely worsening symptoms.   To present to the ER if symptoms worsen.  Chief Complaint     Chief Complaint   Patient presents with    Foot Pain     Patient walked into a large rock while gardening two weeks ago, left foot, hurts to walk, bruising subsided, swelling, describes the pain as throbbing, would like an xray, she's been taking Tylenol and Motrin for relief          History of Present Illness   Lucy Rodriguez presents to the clinic c/o    Leg Pain   The incident occurred more than 1 week ago. The injury mechanism was a direct blow. Pain location: left 5th toe. The pain is moderate. The pain has been Constant since onset. Pertinent negatives include no numbness or tingling. The symptoms are aggravated by movement and palpation. She has tried NSAIDs and ice for the symptoms. The treatment provided mild relief.       Review of Systems   Review of Systems   Constitutional:  Negative for chills,  diaphoresis, fatigue and fever.   HENT:  Negative for congestion, ear discharge, ear pain and facial swelling.    Eyes:  Negative for photophobia, pain, discharge, redness, itching and visual disturbance.   Respiratory:  Negative for apnea, cough, chest tightness, shortness of breath and wheezing.    Cardiovascular:  Negative for chest pain and palpitations.   Gastrointestinal:  Negative for abdominal pain.   Musculoskeletal:  Positive for arthralgias and joint swelling.   Skin:  Negative for color change, rash and wound.   Neurological:  Negative for dizziness, tingling, numbness and headaches.   Hematological:  Negative for adenopathy.         Current Medications     Long-Term Medications   Medication Sig Dispense Refill    acyclovir (ZOVIRAX) 400 MG tablet Take 1 tablet (400 mg total) by mouth 2 (two) times a day 1 po bid as directed 60 tablet 0    ALPRAZolam (XANAX) 0.25 mg tablet Take 1 tablet (0.25 mg total) by mouth if needed for anxiety Take 1-2 tablets 1 hour prior to MRI. Do not drive or operate machinery. Please arrange for ride to and from MRI. Do not take any other sedative medications or alcohol while taking Xanax. (Patient not taking: Reported on 12/27/2024) 2 tablet 0    estradiol (Bonita) 0.05 MG/24HR Place 1 patch on the skin 2 (two) times a week 24 patch 1    estradiol (ESTRACE) 0.1 mg/g vaginal cream Insert 0.5 g into the vagina 2 (two) times a week 42.5 g 1    ezetimibe (ZETIA) 10 mg tablet Take 1 tablet (10 mg total) by mouth daily 90 tablet 1    FLUoxetine 20 MG tablet Take 1 tablet (20 mg total) by mouth daily 90 tablet 3       Current Allergies     Allergies as of 04/30/2025 - Reviewed 04/30/2025   Allergen Reaction Noted    Gabapentin Anxiety and Delirium 12/08/2021    Midazolam Nausea Only and Vomiting 12/09/2022    Nickel Dermatitis and Itching 12/06/2021    Rosuvastatin Myalgia 01/01/2022    Titanium Dermatitis, Itching, and Other (See Comments) 12/06/2021            The following  portions of the patient's history were reviewed and updated as appropriate: allergies, current medications, past family history, past medical history, past social history, past surgical history and problem list.  Past Medical History:   Diagnosis Date    Anxiety 24    Panic attack    Arthritis     Cancer (HCC) 2008    BCC    Depression 2011    Fatigue     Genital warts     GERD (gastroesophageal reflux disease)     Headache(784.0)     Hearing impaired     Herpes     Miscarriage     Osteoarthritis 2011    Ovarian cyst     Retinal hemorrhage     Speech impairment 78    Tonsillitis 75     Past Surgical History:   Procedure Laterality Date    COLONOSCOPY  2017    CONDYLOMA EXCISION/FULGURATION  1987    DILATION AND CURETTAGE OF UTERUS      JOINT REPLACEMENT Right     knee    RETINAL LASER PROCEDURE Left     Horseshoe tear retina    UPPER GASTROINTESTINAL ENDOSCOPY       Social History     Socioeconomic History    Marital status: /Civil Union     Spouse name: Not on file    Number of children: Not on file    Years of education: Not on file    Highest education level: Not on file   Occupational History    Not on file   Tobacco Use    Smoking status: Former     Current packs/day: 0.00     Average packs/day: 0.5 packs/day for 12.9 years (6.5 ttl pk-yrs)     Types: Cigarettes     Start date: 1986     Quit date: 1998     Years since quittin.4    Smokeless tobacco: Never   Vaping Use    Vaping status: Never Used   Substance and Sexual Activity    Alcohol use: Yes     Alcohol/week: 19.0 standard drinks of alcohol     Types: 5 Glasses of wine, 4 Shots of liquor, 10 Standard drinks or equivalent per week     Comment: social     Drug use: Not Currently     Types: Marijuana    Sexual activity: Not Currently     Partners: Male     Birth control/protection: Male Sterilization     Comment: Too painful   Other Topics Concern    Not on file   Social History Narrative    Not on file      Social Drivers of Health     Financial Resource Strain: Low Risk  (1/21/2025)    Overall Financial Resource Strain (CARDIA)     Difficulty of Paying Living Expenses: Not hard at all   Food Insecurity: No Food Insecurity (1/21/2025)    Hunger Vital Sign     Worried About Running Out of Food in the Last Year: Never true     Ran Out of Food in the Last Year: Never true   Transportation Needs: No Transportation Needs (1/21/2025)    PRAPARE - Transportation     Lack of Transportation (Medical): No     Lack of Transportation (Non-Medical): No   Physical Activity: Insufficiently Active (9/15/2023)    Received from [facility]    Exercise Vital Sign     Days of Exercise per Week: 4 days     Minutes of Exercise per Session: 20 min   Stress: Stress Concern Present (9/15/2023)    [facility]    [employer] - Occupational Stress Questionnaire     Feeling of Stress : To some extent   Social Connections: Moderately Integrated (9/15/2023)    [facility] from [facility], [facility]    Social Connection and Isolation Panel [NHANES]     Frequency of Communication with Friends and Family: More than three times a week     Frequency of Social Gatherings with Friends and Family: Twice a week     Attends Orthodox Services: Never     Active Member of Clubs or Organizations: Yes     Attends Club or Organization Meetings: More than 4 times per year     Marital Status:    Intimate Partner Violence: Not At Risk (9/15/2023)    [facility]    Humiliation, Afraid, Rape, and Kick questionnaire     Fear of Current or Ex-Partner: No     Emotionally Abused: No     Physically Abused: No     Sexually Abused: No   Housing Stability: Low Risk  (1/21/2025)    Housing Stability Vital Sign     Unable to Pay for Housing in the Last Year: No      Number of Times Moved in the Last Year: 1     Homeless in the Last Year: No       Objective   /59 (BP Location: Left arm, Patient Position: Sitting, Cuff Size: Large)   Pulse 69   Temp 97.6 °F (36.4 °C) (Tympanic)   Resp 18   Wt 64.3 kg (141 lb 12.8 oz)   LMP 08/16/2023 (Approximate)   SpO2 99%   BMI 24.34 kg/m²      Physical Exam     Physical Exam  Vitals and nursing note reviewed.   Constitutional:       General: She is not in acute distress.     Appearance: She is well-developed. She is not diaphoretic.   HENT:      Head: Normocephalic and atraumatic.      Right Ear: External ear normal.      Left Ear: External ear normal.      Nose: Nose normal.      Mouth/Throat:      Mouth: Mucous membranes are moist.   Eyes:      General: No scleral icterus.        Right eye: No discharge.         Left eye: No discharge.      Conjunctiva/sclera: Conjunctivae normal.   Cardiovascular:      Rate and Rhythm: Normal rate and regular rhythm.      Heart sounds: Normal heart sounds. No murmur heard.     No friction rub. No gallop.   Pulmonary:      Effort: Pulmonary effort is normal. No respiratory distress.      Breath sounds: Normal breath sounds. No decreased breath sounds, wheezing, rhonchi or rales.   Musculoskeletal:        Feet:    Skin:     General: Skin is warm and dry.      Coloration: Skin is not pale.      Findings: No erythema or rash.   Neurological:      Mental Status: She is alert and oriented to person, place, and time.   Psychiatric:         Behavior: Behavior normal.         Thought Content: Thought content normal.         Judgment: Judgment normal.         Sandra Muñoz PA-C

## 2025-05-09 ENCOUNTER — OFFICE VISIT (OUTPATIENT)
Dept: PODIATRY | Facility: CLINIC | Age: 55
End: 2025-05-09
Payer: COMMERCIAL

## 2025-05-09 VITALS — WEIGHT: 141 LBS | BODY MASS INDEX: 24.07 KG/M2 | HEIGHT: 64 IN

## 2025-05-09 DIAGNOSIS — S92.912A UNSPECIFIED FRACTURE OF LEFT TOE(S), INITIAL ENCOUNTER FOR CLOSED FRACTURE: ICD-10-CM

## 2025-05-09 PROCEDURE — 99202 OFFICE O/P NEW SF 15 MIN: CPT | Performed by: PODIATRIST

## 2025-05-09 NOTE — PROGRESS NOTES
Name: Lucy Rodriguez      : 1970      MRN: [id number]  Encounter Provider: Kevin Mike DPM  Encounter Date: 2025   Encounter department: [facility]  :  Assessment & Plan  Unspecified fracture of left toe(s), initial encounter for closed fracture  Urgent care notes and x-rays from 2025 personally reviewed, x-rays show oblique fracture left fifth toe which has a intra-articular component.  Fracture is in good alignment and does not need any fixation.    Recommend rest ice compression and elevation with decreased activity for the next week or 2.  May continue to wear structurally supportive sneaker.  No further care necessary  Follow-up as needed    Orders:  •  Ambulatory Referral to Orthopedic Surgery        History of Present Illness   HPI  Lucy Rodriguez is a 54 y.o. female who presents for evaluation of left fifth toe fracture.  Patient reports injuring toe 3 weeks ago stubbing it on a large rock in her backyard.  Went to urgent care on 2025 and had x-ray completed which showed fracture of the fifth toe.  Patient is now here for follow-up care  History obtained from: patient    Review of Systems   Constitutional: Negative.    HENT: Negative.     Eyes: Negative.    Respiratory: Negative.     Cardiovascular: Negative.    Gastrointestinal: Negative.    Endocrine: Negative.    Genitourinary: Negative.    Musculoskeletal:  Positive for arthralgias and joint swelling.        Mild pain and swelling left fifth toe   Skin: Negative.    Allergic/Immunologic: Negative.    Neurological: Negative.    Hematological: Negative.    Psychiatric/Behavioral: Negative.       Past Medical History   Past Medical History:   Diagnosis Date   • Anxiety 24    Panic attack   • Arthritis    • Cancer (HCC)     BCC   • Depression    • Fatigue    • Genital warts    • GERD (gastroesophageal reflux disease)    • Headache(784.0)    • Hearing impaired    • Herpes    • Miscarriage    •  Osteoarthritis    • Ovarian cyst    • Retinal hemorrhage    • Speech impairment 78   • Tonsillitis 75     Past Surgical History:   Procedure Laterality Date   • COLONOSCOPY     • CONDYLOMA EXCISION/FULGURATION     • DILATION AND CURETTAGE OF UTERUS     • JOINT REPLACEMENT Right     knee   • RETINAL LASER PROCEDURE Left     Horseshoe tear retina   • UPPER GASTROINTESTINAL ENDOSCOPY       Family History   Problem Relation Age of Onset   • Heart disease Mother         Aortic stenosis   • Deep vein thrombosis Mother    • Osteoarthritis Mother    • Thyroid disease Mother    • Arthritis Mother    • Depression Mother    • Rheum arthritis Mother    • Hypothyroidism Mother    • Hypertension Father    • Hyperlipidemia Father    • Heart disease Father    • Seizures Father    • Transient ischemic attack Father    • Arthritis Father    • Colon polyps Father    • Hyperlipidemia Sister    • Depression Sister    • Irritable bowel syndrome Sister    • Heart failure Maternal Grandmother    • Heart attack Maternal Grandmother         age 70 CABG   • Heart disease Maternal Grandmother         age 78    • Heart attack Maternal Grandfather         age 66    • Heart disease Maternal Grandfather    • No Known Problems Paternal Grandmother    • Heart attack Paternal Grandfather         age 44    • No Known Problems Brother    • Heart failure Cousin    • Breast cancer Cousin 50   • Breast cancer Cousin 52   • No Known Problems Maternal Aunt    • Pancreatic cancer Maternal Aunt 80   • No Known Problems Paternal Aunt    • Heart disease Maternal Uncle         Heart attack   • Heart attack Maternal Uncle         age 60      reports that she quit smoking about 26 years ago. Her smoking use included cigarettes. She started smoking about 39 years ago. She has a 6.5 pack-year smoking history. She has been exposed to tobacco smoke. She has never used smokeless tobacco. She reports current  "alcohol use of about 19.0 standard drinks of alcohol per week. She reports that she does not currently use drugs after having used the following drugs: Marijuana.  Current Outpatient Medications   Medication Instructions   • acetaminophen (TYLENOL) 1,000 mg, Every 6 hours PRN   • acyclovir (ZOVIRAX) 400 mg, Oral, 2 times daily, 1 po bid as directed   • ALPRAZolam (XANAX) 0.25 mg, Oral, As needed, Take 1-2 tablets 1 hour prior to MRI. Do not drive or operate machinery. Please arrange for ride to and from MRI. Do not take any other sedative medications or alcohol while taking Xanax.   • amoxicillin (AMOXIL) 500 mg capsule As needed   • Biotin 10 MG TABS Daily   • Cholecalciferol (Vitamin D3) 125 MCG (5000 UT) CAPS    • estradiol (Bonita) 0.05 MG/24HR 1 patch, Transdermal, 2 times weekly   • estradiol (ESTRACE) 0.5 g, Vaginal, 2 times weekly   • ezetimibe (ZETIA) 10 mg, Oral, Daily   • FLUoxetine 20 mg, Oral, Daily   • Progesterone 100 MG CAPS 1 capsule, Oral, Daily at bedtime     Allergies   Allergen Reactions   • Gabapentin Anxiety and Delirium   • Midazolam Nausea Only and Vomiting   • Nickel Dermatitis and Itching   • Rosuvastatin Myalgia   • Titanium Dermatitis, Itching and Other (See Comments)         Objective   Ht 5' 4\" (1.626 m) Comment: stated  Wt 64 kg (141 lb)   LMP 08/16/2023 (Approximate)   BMI 24.20 kg/m²      Physical Exam  Vitals and nursing note reviewed.   Constitutional:       General: She is not in acute distress.     Appearance: Normal appearance. She is well-developed.   HENT:      Head: Normocephalic and atraumatic.      Nose: Nose normal.   Eyes:      Conjunctiva/sclera: Conjunctivae normal.   Cardiovascular:      Rate and Rhythm: Normal rate and regular rhythm.      Pulses:           Dorsalis pedis pulses are 1+ on the right side and 1+ on the left side.        Posterior tibial pulses are 1+ on the right side and 1+ on the left side.   Pulmonary:      Effort: Pulmonary effort is normal. No " respiratory distress.   Musculoskeletal:         General: Tenderness and signs of injury present. No swelling.      Cervical back: Neck supple.      Left foot: Decreased range of motion.      Comments:   Left fifth toe: Moderate pain with palpation especially at the PIPJ and with range of motion.  Overall excellent clinical alignment..   Feet:      Right foot:      Protective Sensation: 10 sites tested.  10 sites sensed.      Left foot:      Protective Sensation: 10 sites tested.  10 sites sensed.   Skin:     General: Skin is warm and dry.      Capillary Refill: Capillary refill takes 2 to 3 seconds.   Neurological:      General: No focal deficit present.      Mental Status: She is alert.   Psychiatric:         Mood and Affect: Mood normal.

## 2025-06-15 ENCOUNTER — NURSE TRIAGE (OUTPATIENT)
Dept: OTHER | Facility: OTHER | Age: 55
End: 2025-06-15

## 2025-06-15 NOTE — TELEPHONE ENCOUNTER
"Regarding: cramping/spotting/post menopausal  ----- Message from Soraida SCOTT sent at 6/15/2025 10:16 AM EDT -----  \"I am post menopausal and I have been cramping a lot, having increased urine urgency, and some spotting I think may need to be seen by my provider with [facility].\"    "

## 2025-06-15 NOTE — TELEPHONE ENCOUNTER
"REASON FOR CONVERSATION: Vaginal Bleeding    SYMPTOMS: Cramping and spotting since Thursday in postmenopause (last period mid 2023)    OTHER HEALTH INFORMATION: HRT    PROTOCOL DISPOSITION: See PCP Within 2 Weeks    CARE ADVICE PROVIDED: Message to office for scheduling; call back precautions reviewed.    PRACTICE FOLLOW-UP: Please reach out to patient to schedule an appt.          Reason for Disposition   Postmenopausal vaginal bleeding    Answer Assessment - Initial Assessment Questions  1. BLEEDING SEVERITY: \"Describe the bleeding that you are having.\" \"How much bleeding is there?\"        At start brown stringy discharge; ongoing pink discharge  Spotting at this time    2. ONSET: \"When did the bleeding begin?\" \"Is it continuing now?\"        Cramping x 5 days  Spotting on Thursday AM    3. MENOPAUSE: \"When was your last menstrual period?\"         Approx mid 2023    4. ABDOMEN PAIN: \"Do you have any pain?\" \"How bad is the pain?\"  (e.g., Scale 0-10; none, mild, moderate, or severe)        Lower pelvic cramping, more left than right  Constant ache    5. BLOOD THINNERS: \"Do you take any blood thinners?\" (e.g., Coumadin/warfarin, Pradaxa/dabigatran, aspirin)        Denies    6. HORMONE MEDICINES: \"Are you taking any hormone medicines, prescription or OTC?\" (e.g., birth control pills, estrogen)        HRT 6-9 months    7. CAUSE: \"What do you think is causing the bleeding?\" (e.g., recent gyn surgery, recent gyn procedure; known bleeding disorder, uterine cancer)        Unknown    8. OTHER SYMPTOMS: \"What other symptoms are you having with the bleeding?\" (e.g., back pain, burning with urination, fever)        Increased urinary frequency; denies burning; yellow in color in clear    Protocols used: Vaginal Bleeding - Postmenopausal-Adult-AH    "

## 2025-06-16 ENCOUNTER — TELEPHONE (OUTPATIENT)
Age: 55
End: 2025-06-16

## 2025-06-16 NOTE — TELEPHONE ENCOUNTER
Patient said she has too much going on next week and asking to come in Wednesday. Looking to come in 6/18. I did look I didn't see anything sooner for an appt.  Please advice  Thank You

## 2025-06-16 NOTE — TELEPHONE ENCOUNTER
Spoke to Pt. Earlier today and explained no appt this Wednesday when I scheduled appt.  Called back after receiving message and LM explaining same thing as earlier today.  LM pt. Can call back to reschedule if She would like.  We can call if we have a cancellation on 6/18

## 2025-06-18 DIAGNOSIS — B00.9 HERPES: ICD-10-CM

## 2025-06-19 ENCOUNTER — PATIENT MESSAGE (OUTPATIENT)
Dept: GYNECOLOGY | Facility: CLINIC | Age: 55
End: 2025-06-19

## 2025-06-19 DIAGNOSIS — R10.2 PELVIC PAIN: Primary | ICD-10-CM

## 2025-06-19 RX ORDER — ACYCLOVIR 400 MG/1
400 TABLET ORAL 2 TIMES DAILY
Qty: 60 TABLET | Refills: 0 | Status: SHIPPED | OUTPATIENT
Start: 2025-06-19 | End: 2025-07-19

## 2025-06-20 RX ORDER — NAPROXEN 500 MG/1
500 TABLET ORAL 2 TIMES DAILY WITH MEALS
Qty: 30 TABLET | Refills: 0 | Status: SHIPPED | OUTPATIENT
Start: 2025-06-20 | End: 2025-06-23

## 2025-06-20 NOTE — TELEPHONE ENCOUNTER
Pls inform the pt that I sent naprosyn in to take twice daily with food for pain until ultrasound. She should go to ED with severe sx.

## 2025-06-23 ENCOUNTER — OFFICE VISIT (OUTPATIENT)
Dept: INTERNAL MEDICINE CLINIC | Facility: CLINIC | Age: 55
End: 2025-06-23

## 2025-06-23 VITALS
DIASTOLIC BLOOD PRESSURE: 67 MMHG | HEART RATE: 70 BPM | SYSTOLIC BLOOD PRESSURE: 107 MMHG | OXYGEN SATURATION: 98 % | BODY MASS INDEX: 24 KG/M2 | WEIGHT: 139.8 LBS | TEMPERATURE: 97.7 F

## 2025-06-23 DIAGNOSIS — E78.49 OTHER HYPERLIPIDEMIA: ICD-10-CM

## 2025-06-23 DIAGNOSIS — Z82.62 FAMILY HISTORY OF OSTEOPOROSIS: ICD-10-CM

## 2025-06-23 DIAGNOSIS — H90.3 SENSORINEURAL HEARING LOSS (SNHL) OF BOTH EARS: ICD-10-CM

## 2025-06-23 DIAGNOSIS — M85.859 OSTEOPENIA OF HIP, UNSPECIFIED LATERALITY: ICD-10-CM

## 2025-06-23 DIAGNOSIS — Z00.00 WELL ADULT EXAM: Primary | ICD-10-CM

## 2025-06-23 DIAGNOSIS — Q04.8 CEREBELLAR TONSILLAR ECTOPIA (HCC): ICD-10-CM

## 2025-06-23 DIAGNOSIS — Z12.31 SCREENING MAMMOGRAM FOR BREAST CANCER: Primary | ICD-10-CM

## 2025-06-23 DIAGNOSIS — E55.9 VITAMIN D DEFICIENCY: ICD-10-CM

## 2025-06-23 DIAGNOSIS — E56.9 VITAMIN DEFICIENCY: ICD-10-CM

## 2025-06-23 DIAGNOSIS — F32.2 SEVERE MAJOR DEPRESSIVE DISORDER (HCC): ICD-10-CM

## 2025-06-23 DIAGNOSIS — Z78.0 POST-MENOPAUSAL: ICD-10-CM

## 2025-06-23 PROBLEM — M85.80 OSTEOPENIA: Status: RESOLVED | Noted: 2025-06-23 | Resolved: 2025-06-23

## 2025-06-23 PROBLEM — M85.80 OSTEOPENIA: Status: ACTIVE | Noted: 2025-06-23

## 2025-06-23 PROCEDURE — 99396 PREV VISIT EST AGE 40-64: CPT | Performed by: FAMILY MEDICINE

## 2025-06-23 PROCEDURE — 99214 OFFICE O/P EST MOD 30 MIN: CPT | Performed by: FAMILY MEDICINE

## 2025-06-23 NOTE — ASSESSMENT & PLAN NOTE
Patient follows with gyn she has been on ERT , Vivelle Dot , estrace cream stil with significant vaginal dryness , dyspareunia had developed recent pelvic pain , spotting going for pelvic ultrasound this week

## 2025-06-23 NOTE — ASSESSMENT & PLAN NOTE
Low fat diet , regular exercise patient is taking zetia daily follows with cardiology   Orders:    Comprehensive metabolic panel; Future

## 2025-06-23 NOTE — ASSESSMENT & PLAN NOTE
Patient has been doing very well on prozac 20 mg , will continue same dose , she is very healthy with her diet , will be getting back to regular exercise ( had sustained a toe fracture and had to take some time off )

## 2025-06-23 NOTE — ASSESSMENT & PLAN NOTE
Patient declines Prevnar , she is up to date with health maintenance recommendations   Orders:    Lipid panel; Future    CBC and differential; Future    TSH, 3rd generation with Free T4 reflex; Future    Comprehensive metabolic panel; Future

## 2025-06-23 NOTE — ASSESSMENT & PLAN NOTE
She is taking D 3 and calcium daily , getting back to regular weight bearing exercise as her toe is feeling better post fracture

## 2025-06-23 NOTE — PATIENT INSTRUCTIONS
Medicare Preventive Visit Patient Instructions  Thank you for completing your Welcome to Medicare Visit or Medicare Annual Wellness Visit today. Your next wellness visit will be due in one year (6/24/2026).  The screening/preventive services that you may require over the next 5-10 years are detailed below. Some tests may not apply to you based off risk factors and/or age. Screening tests ordered at today's visit but not completed yet may show as past due. Also, please note that scanned in results may not display below.  Preventive Screenings:  Service Recommendations Previous Testing/Comments   Colorectal Cancer Screening  * Colonoscopy    * Fecal Occult Blood Test (FOBT)/Fecal Immunochemical Test (FIT)  * Fecal DNA/Cologuard Test  * Flexible Sigmoidoscopy Age: 45-75 years old   Colonoscopy: every 10 years (may be performed more frequently if at higher risk)  OR  FOBT/FIT: every 1 year  OR  Cologuard: every 3 years  OR  Sigmoidoscopy: every 5 years  Screening may be recommended earlier than age 45 if at higher risk for colorectal cancer. Also, an individualized decision between you and your healthcare provider will decide whether screening between the ages of 76-85 would be appropriate. Colonoscopy: 05/31/2022  FOBT/FIT: Not on file  Cologuard: Not on file  Sigmoidoscopy: Not on file          Breast Cancer Screening Age: 40+ years old  Frequency: every 1-2 years  Not required if history of left and right mastectomy Mammogram: 02/04/2025        Cervical Cancer Screening Between the ages of 21-29, pap smear recommended once every 3 years.   Between the ages of 30-65, can perform pap smear with HPV co-testing every 5 years.   Recommendations may differ for women with a history of total hysterectomy, cervical cancer, or abnormal pap smears in past. Pap Smear: 06/14/2023        Hepatitis C Screening Once for adults born between 1945 and 1965  More frequently in patients at high risk for Hepatitis C Hep C Antibody:  03/10/2022        Diabetes Screening 1-2 times per year if you're at risk for diabetes or have pre-diabetes Fasting glucose: 78 mg/dL (3/26/2024)  A1C: 5.1 % (12/8/2021)      Cholesterol Screening Once every 5 years if you don't have a lipid disorder. May order more often based on risk factors. Lipid panel: 12/05/2024          Other Preventive Screenings Covered by Medicare:  Abdominal Aortic Aneurysm (AAA) Screening: covered once if your at risk. You're considered to be at risk if you have a family history of AAA.  Lung Cancer Screening: covers low dose CT scan once per year if you meet all of the following conditions: (1) Age 55-77; (2) No signs or symptoms of lung cancer; (3) Current smoker or have quit smoking within the last 15 years; (4) You have a tobacco smoking history of at least 20 pack years (packs per day multiplied by number of years you smoked); (5) You get a written order from a healthcare provider.  Glaucoma Screening: covered annually if you're considered high risk: (1) You have diabetes OR (2) Family history of glaucoma OR (3)  aged 50 and older OR (4)  American aged 65 and older  Osteoporosis Screening: covered every 2 years if you meet one of the following conditions: (1) You're estrogen deficient and at risk for osteoporosis based off medical history and other findings; (2) Have a vertebral abnormality; (3) On glucocorticoid therapy for more than 3 months; (4) Have primary hyperparathyroidism; (5) On osteoporosis medications and need to assess response to drug therapy.   Last bone density test (DXA Scan): 11/05/2024.  HIV Screening: covered annually if you're between the age of 15-65. Also covered annually if you are younger than 15 and older than 65 with risk factors for HIV infection. For pregnant patients, it is covered up to 3 times per pregnancy.    Immunizations:  Immunization Recommendations   Influenza Vaccine Annual influenza vaccination during flu season is  recommended for all persons aged >= 6 months who do not have contraindications   Pneumococcal Vaccine   * Pneumococcal conjugate vaccine = PCV13 (Prevnar 13), PCV15 (Vaxneuvance), PCV20 (Prevnar 20)  * Pneumococcal polysaccharide vaccine = PPSV23 (Pneumovax) Adults 19-63 yo with certain risk factors or if 65+ yo  If never received any pneumonia vaccine: recommend Prevnar 20 (PCV20)  Give PCV20 if previously received 1 dose of PCV13 or PPSV23   Hepatitis B Vaccine 3 dose series if at intermediate or high risk (ex: diabetes, end stage renal disease, liver disease)   Respiratory syncytial virus (RSV) Vaccine - COVERED BY MEDICARE PART D  * RSVPreF3 (Arexvy) CDC recommends that adults 60 years of age and older may receive a single dose of RSV vaccine using shared clinical decision-making (SCDM)   Tetanus (Td) Vaccine - COST NOT COVERED BY MEDICARE PART B Following completion of primary series, a booster dose should be given every 10 years to maintain immunity against tetanus. Td may also be given as tetanus wound prophylaxis.   Tdap Vaccine - COST NOT COVERED BY MEDICARE PART B Recommended at least once for all adults. For pregnant patients, recommended with each pregnancy.   Shingles Vaccine (Shingrix) - COST NOT COVERED BY MEDICARE PART B  2 shot series recommended in those 19 years and older who have or will have weakened immune systems or those 50 years and older     Health Maintenance Due:      Topic Date Due   • HIV Screening  Never done   • Breast Cancer Screening: Mammogram  02/04/2026   • Cervical Cancer Screening  06/14/2028   • Colorectal Cancer Screening  05/28/2032   • Hepatitis C Screening  Completed     Immunizations Due:      Topic Date Due   • Pneumococcal Vaccine: 50+ Years (1 of 1 - PCV) Never done   • COVID-19 Vaccine (4 - 2024-25 season) 09/01/2024   • Influenza Vaccine (Season Ended) 09/01/2025     Advance Directives   What are advance directives?  Advance directives are legal documents that state  your wishes and plans for medical care. These plans are made ahead of time in case you lose your ability to make decisions for yourself. Advance directives can apply to any medical decision, such as the treatments you want, and if you want to donate organs.   What are the types of advance directives?  There are many types of advance directives, and each state has rules about how to use them. You may choose a combination of any of the following:  Living will:  This is a written record of the treatment you want. You can also choose which treatments you do not want, which to limit, and which to stop at a certain time. This includes surgery, medicine, IV fluid, and tube feedings.   Durable power of  for healthcare (DPAHC):  This is a written record that states who you want to make healthcare choices for you when you are unable to make them for yourself. This person, called a proxy, is usually a family member or a friend. You may choose more than 1 proxy.  Do not resuscitate (DNR) order:  A DNR order is used in case your heart stops beating or you stop breathing. It is a request not to have certain forms of treatment, such as CPR. A DNR order may be included in other types of advance directives.  Medical directive:  This covers the care that you want if you are in a coma, near death, or unable to make decisions for yourself. You can list the treatments you want for each condition. Treatment may include pain medicine, surgery, blood transfusions, dialysis, IV or tube feedings, and a ventilator (breathing machine).  Values history:  This document has questions about your views, beliefs, and how you feel and think about life. This information can help others choose the care that you would choose.  Why are advance directives important?  An advance directive helps you control your care. Although spoken wishes may be used, it is better to have your wishes written down. Spoken wishes can be misunderstood, or not  followed. Treatments may be given even if you do not want them. An advance directive may make it easier for your family to make difficult choices about your care.       © Copyright [employer] 2018 Information is for End User's use only and may not be sold, redistributed or otherwise used for commercial purposes. All illustrations and images included in CareNotes® are the copyrighted property of [email or web address] or [employer]

## 2025-06-23 NOTE — PROGRESS NOTES
Name: Lucy Rodriguez      : 1970      MRN: [id number]  Encounter Provider: Sierra Kunz MD  Encounter Date: 2025   Encounter department: [facility]  :  Assessment & Plan  Well adult exam  Patient declines Prevnar , she is up to date with health maintenance recommendations   Orders:    Lipid panel; Future    CBC and differential; Future    TSH, 3rd generation with Free T4 reflex; Future    Comprehensive metabolic panel; Future    Family history of osteoporosis         Other hyperlipidemia  Low fat diet , regular exercise patient is taking zetia daily follows with cardiology   Orders:    Comprehensive metabolic panel; Future    Vitamin D deficiency  Patient taking D 3 daily due for follow up level   Orders:    Vitamin D 25 hydroxy; Future    Sensorineural hearing loss (SNHL) of both ears  Patient follows with Dr REYNALDO Joseph due for follow up exxam        Vitamin deficiency    Orders:    Vitamin B12; Future    Folate; Future    Severe major depressive disorder (HCC)    Patient has been doing very well on prozac 20 mg , will continue same dose , she is very healthy with her diet , will be getting back to regular exercise ( had sustained a toe fracture and had to take some time off )        Cerebellar tonsillar ectopia (HCC)         Osteopenia of hip, unspecified laterality  She is taking D 3 and calcium daily , getting back to regular weight bearing exercise as her toe is feeling better post fracture       Post-menopausal  Patient follows with gyn she has been on ERT , Vivelle Dot , estrace cream stil with significant vaginal dryness , dyspareunia had developed recent pelvic pain , spotting going for pelvic ultrasound this week           Preventive health issues were discussed with patient, and age appropriate screening tests were ordered as noted in patient's After Visit Summary. Personalized health advice and appropriate referrals for health education or preventive  services given if needed, as noted in patient's After Visit Summary.    History of Present Illness     HPI patient here for AWV , PMH chr fatigue , sleep disturbance , post menopausal , imbalance   She saw gyn , she had spotting is scheduled for pelvic US this week   She is on estrogen patch x 9 months , prozac has helped her a lot     Vision exam May 2025 + glasses   Dental exam q 6 months   Hearing bilateral hearing loss bilateral hearing aids [facility]   Diet vegan 5-6 servings smoothie protein powder , tofu Godwin seeds , tempe , soy , 5- 10 servings veggies fruits , water 6-8 servings  coffee 3 cups - juice   Weight watchers since October she has lost 20 lbs   Exercise was using [employer] broke her toe had to take it easy   Smoking quit 1998 smoked 1/2 ppd x 12 years + ETOH weekends   Last dexa osteopenia , she takes Vit D with K , calcium ,   Fam hx   Fam hx cancer   Patient Care Team:  Sierra Kunz MD as PCP - General (Family Medicine)    Review of Systems   Constitutional:  Positive for fatigue. Negative for chills and fever.   HENT:  Negative for ear pain and sore throat.    Eyes:  Negative for pain and visual disturbance.   Respiratory:  Negative for cough and shortness of breath.    Cardiovascular:  Negative for chest pain and palpitations.   Gastrointestinal:  Negative for abdominal pain and vomiting.   Genitourinary:  Negative for dysuria and hematuria.        Post menopausal    Musculoskeletal:  Positive for arthralgias. Negative for back pain.        Bilateral knees    Skin:  Negative for color change and rash.   Neurological:  Negative for seizures and syncope.   All other systems reviewed and are negative.    Medical History Reviewed by provider this encounter:  Tobacco  Allergies  Meds  Problems  Med Hx  Surg Hx  Fam Hx       Annual Wellness Visit Questionnaire       Health Risk Assessment:   Patient rates overall health as good. Patient feels that their physical health rating is  slightly better. Patient is satisfied with their life. Eyesight was rated as same. Hearing was rated as same. Patient feels that their emotional and mental health rating is much better. Patients states they are never, rarely angry. Patient states they are always unusually tired/fatigued. Pain experienced in the last 7 days has been some. Patient's pain rating has been 3/10. Patient states that she has experienced no weight loss or gain in last 6 months. Weight watchers since oct 2024 lost 20lbs    Fall Risk Screening:   In the past year, patient has experienced: no history of falling in past year      Urinary Incontinence Screening:   Patient has leaked urine accidently in the last six months.     Home Safety:  Patient does not have trouble with stairs inside or outside of their home. Patient has working smoke alarms and has no working carbon monoxide detector. Home safety hazards include: none.     Nutrition:   Current diet is Other (please comment). Vegan    Medications:   Patient is currently taking over-the-counter supplements. OTC medications include: see medication list. Patient is able to manage medications.     Activities of Daily Living (ADLs)/Instrumental Activities of Daily Living (IADLs):   Walk and transfer into and out of bed and chair?: Yes  Dress and groom yourself?: Yes    Bathe or shower yourself?: Yes    Feed yourself? Yes  Do your laundry/housekeeping?: Yes  Manage your money, pay your bills and track your expenses?: Yes  Make your own meals?: Yes    Do your own shopping?: Yes    Durable Medical Equipment Suppliers  Na    Previous Hospitalizations:   Any hospitalizations or ED visits within the last 12 months?: No      Advance Care Planning:   Living will: Yes    Durable POA for healthcare: Yes    Advanced directive: Yes      Preventive Screenings      Cardiovascular Screening:    General: Screening Not Indicated and History Lipid Disorder      Colorectal Cancer Screening:     General: Screening  Current      Breast Cancer Screening:     General: Screening Current      Cervical Cancer Screening:    General: Screening Current      Lung Cancer Screening:     General: Screening Not Indicated      Hepatitis C Screening:    General: Screening Current    Immunizations:  - Immunizations due: Prevnar 20    Screening, Brief Intervention, and Referral to Treatment (SBIRT)     Screening  Typical number of drinks in a day: 2  Typical number of drinks in a week: 10  Interpretation: Low risk drinking behavior.    AUDIT-C Screenin) How often did you have a drink containing alcohol in the past year? 2 to 3 times a week  2) How many drinks did you have on a typical day when you were drinking in the past year? 1 to 2  3) How often did you have 6 or more drinks on one occasion in the past year? never    AUDIT-C Score: 3  Interpretation: Score 3-12 (female): POSITIVE screen for alcohol misuse    AUDIT Screenin) How often during the last year have you found that you were not able to stop drinking once you had started? 0 - never  5) How often during the last year have you failed to do what was normally expected from you because of drinking? 0 - never  6) How often during the last year have you needed a first drink in the morning to get yourself going after a heavy drinking session? 0 - never  7) How often during the last year have you had a feeling of guilt or remorse after drinking? 1 - less than monthly  8) How often during the last year have you been unable to remember what happened the night before because you had been drinking? 0 - never  9) Have you or someone else been injured as a result of your drinking? 0 - no  10) Has a relative or friend or a doctor or another health worker been concerned about your drinking or suggested you cut down? 0 - no    AUDIT Score: 4  Interpretation: Low risk alcohol consumption    Single Item Drug Screening:  How often have you used an illegal drug (including marijuana) or a  prescription medication for non-medical reasons in the past year? never    Single Item Drug Screen Score: 0  Interpretation: Negative screen for possible drug use disorder    Social Drivers of Health     Financial Resource Strain: Low Risk  (6/16/2025)    Overall Financial Resource Strain (CARDIA)     Difficulty of Paying Living Expenses: Not hard at all   Food Insecurity: No Food Insecurity (6/16/2025)    Nursing - Inadequate Food Risk Classification     Worried About Running Out of Food in the Last Year: Never true     Ran Out of Food in the Last Year: Never true   Transportation Needs: No Transportation Needs (6/16/2025)    PRAPARE - Transportation     Lack of Transportation (Medical): No     Lack of Transportation (Non-Medical): No   Housing Stability: Low Risk  (6/16/2025)    Housing Stability Vital Sign     Unable to Pay for Housing in the Last Year: No     Number of Times Moved in the Last Year: 1     Homeless in the Last Year: No   Utilities: Not At Risk (6/16/2025)    [facility] Utilities     Threatened with loss of utilities: No     No results found.    Objective   /67 (BP Location: Left arm, Patient Position: Sitting, Cuff Size: Adult)   Pulse 70   Temp 97.7 °F (36.5 °C) (Temporal)   Wt 63.4 kg (139 lb 12.8 oz)   LMP 08/16/2023 (Approximate)   SpO2 98%   BMI 24.00 kg/m²     Physical Exam  Vitals and nursing note reviewed.   Constitutional:       General: She is not in acute distress.     Appearance: She is well-developed.      Comments: Skin with good color turgor , well hydrated ,no distress noted     HENT:      Head: Normocephalic and atraumatic.      Right Ear: Decreased hearing noted. No middle ear effusion. There is no impacted cerumen.      Left Ear: Decreased hearing noted.  No middle ear effusion. There is no impacted cerumen.      Mouth/Throat:      Mouth: Mucous membranes are moist.      Pharynx: Oropharynx is clear.     Eyes:      Conjunctiva/sclera: Conjunctivae normal.     Neck:       Thyroid: No thyromegaly.     Cardiovascular:      Rate and Rhythm: Normal rate and regular rhythm.      Heart sounds: Normal heart sounds. No murmur heard.  Pulmonary:      Effort: Pulmonary effort is normal. No respiratory distress.      Breath sounds: Normal breath sounds.   Abdominal:      Palpations: Abdomen is soft.      Tenderness: There is no abdominal tenderness. There is no right CVA tenderness or left CVA tenderness.     Musculoskeletal:         General: No swelling.      Cervical back: Neck supple.   Lymphadenopathy:      Cervical:      Right cervical: No superficial cervical adenopathy.     Left cervical: No superficial cervical adenopathy.     Skin:     General: Skin is warm and dry.      Capillary Refill: Capillary refill takes less than 2 seconds.     Neurological:      Mental Status: She is alert.      Comments: Non focal exam    Psychiatric:         Attention and Perception: Attention normal.         Mood and Affect: Mood normal.         Speech: Speech normal.         Behavior: Behavior normal.         Thought Content: Thought content normal.

## 2025-06-24 ENCOUNTER — APPOINTMENT (OUTPATIENT)
Dept: LAB | Facility: CLINIC | Age: 55
End: 2025-06-24
Payer: COMMERCIAL

## 2025-06-24 DIAGNOSIS — E55.9 VITAMIN D DEFICIENCY: ICD-10-CM

## 2025-06-24 DIAGNOSIS — E78.49 OTHER HYPERLIPIDEMIA: ICD-10-CM

## 2025-06-24 DIAGNOSIS — E56.9 VITAMIN DEFICIENCY: ICD-10-CM

## 2025-06-24 DIAGNOSIS — Z00.00 WELL ADULT EXAM: ICD-10-CM

## 2025-06-24 LAB
25(OH)D3 SERPL-MCNC: 53 NG/ML (ref 30–100)
ALBUMIN SERPL BCG-MCNC: 4.4 G/DL (ref 3.5–5)
ALP SERPL-CCNC: 61 U/L (ref 34–104)
ALT SERPL W P-5'-P-CCNC: 16 U/L (ref 7–52)
ANION GAP SERPL CALCULATED.3IONS-SCNC: 10 MMOL/L (ref 4–13)
AST SERPL W P-5'-P-CCNC: 23 U/L (ref 13–39)
BASOPHILS # BLD AUTO: 0.04 THOUSANDS/ÂΜL (ref 0–0.1)
BASOPHILS NFR BLD AUTO: 1 % (ref 0–1)
BILIRUB SERPL-MCNC: 0.65 MG/DL (ref 0.2–1)
BUN SERPL-MCNC: 18 MG/DL (ref 5–25)
CALCIUM SERPL-MCNC: 8.9 MG/DL (ref 8.4–10.2)
CHLORIDE SERPL-SCNC: 99 MMOL/L (ref 96–108)
CHOLEST SERPL-MCNC: 165 MG/DL (ref ?–200)
CO2 SERPL-SCNC: 26 MMOL/L (ref 21–32)
CREAT SERPL-MCNC: 0.68 MG/DL (ref 0.6–1.3)
EOSINOPHIL # BLD AUTO: 0.07 THOUSAND/ÂΜL (ref 0–0.61)
EOSINOPHIL NFR BLD AUTO: 2 % (ref 0–6)
ERYTHROCYTE [DISTWIDTH] IN BLOOD BY AUTOMATED COUNT: 12.5 % (ref 11.6–15.1)
FOLATE SERPL-MCNC: 18.5 NG/ML
GFR SERPL CREATININE-BSD FRML MDRD: 99 ML/MIN/1.73SQ M
GLUCOSE P FAST SERPL-MCNC: 83 MG/DL (ref 65–99)
HCT VFR BLD AUTO: 39.1 % (ref 34.8–46.1)
HDLC SERPL-MCNC: 74 MG/DL
HGB BLD-MCNC: 12.5 G/DL (ref 11.5–15.4)
IMM GRANULOCYTES # BLD AUTO: 0 THOUSAND/UL (ref 0–0.2)
IMM GRANULOCYTES NFR BLD AUTO: 0 % (ref 0–2)
LDLC SERPL CALC-MCNC: 75 MG/DL (ref 0–100)
LYMPHOCYTES # BLD AUTO: 1.55 THOUSANDS/ÂΜL (ref 0.6–4.47)
LYMPHOCYTES NFR BLD AUTO: 43 % (ref 14–44)
MCH RBC QN AUTO: 29.8 PG (ref 26.8–34.3)
MCHC RBC AUTO-ENTMCNC: 32 G/DL (ref 31.4–37.4)
MCV RBC AUTO: 93 FL (ref 82–98)
MONOCYTES # BLD AUTO: 0.4 THOUSAND/ÂΜL (ref 0.17–1.22)
MONOCYTES NFR BLD AUTO: 11 % (ref 4–12)
NEUTROPHILS # BLD AUTO: 1.54 THOUSANDS/ÂΜL (ref 1.85–7.62)
NEUTS SEG NFR BLD AUTO: 43 % (ref 43–75)
NONHDLC SERPL-MCNC: 91 MG/DL
NRBC BLD AUTO-RTO: 0 /100 WBCS
PLATELET # BLD AUTO: 193 THOUSANDS/UL (ref 149–390)
PMV BLD AUTO: 11.9 FL (ref 8.9–12.7)
POTASSIUM SERPL-SCNC: 3.6 MMOL/L (ref 3.5–5.3)
PROT SERPL-MCNC: 6.8 G/DL (ref 6.4–8.4)
RBC # BLD AUTO: 4.2 MILLION/UL (ref 3.81–5.12)
SODIUM SERPL-SCNC: 135 MMOL/L (ref 135–147)
TRIGL SERPL-MCNC: 78 MG/DL (ref ?–150)
TSH SERPL DL<=0.05 MIU/L-ACNC: 2.1 UIU/ML (ref 0.45–4.5)
VIT B12 SERPL-MCNC: 320 PG/ML (ref 180–914)
WBC # BLD AUTO: 3.6 THOUSAND/UL (ref 4.31–10.16)

## 2025-06-24 PROCEDURE — 82746 ASSAY OF FOLIC ACID SERUM: CPT

## 2025-06-24 PROCEDURE — 80061 LIPID PANEL: CPT

## 2025-06-24 PROCEDURE — 82306 VITAMIN D 25 HYDROXY: CPT

## 2025-06-24 PROCEDURE — 36415 COLL VENOUS BLD VENIPUNCTURE: CPT

## 2025-06-24 PROCEDURE — 84443 ASSAY THYROID STIM HORMONE: CPT

## 2025-06-24 PROCEDURE — 82607 VITAMIN B-12: CPT

## 2025-06-24 PROCEDURE — 85025 COMPLETE CBC W/AUTO DIFF WBC: CPT

## 2025-06-24 PROCEDURE — 80053 COMPREHEN METABOLIC PANEL: CPT

## 2025-06-25 ENCOUNTER — OFFICE VISIT (OUTPATIENT)
Dept: GYNECOLOGY | Facility: CLINIC | Age: 55
End: 2025-06-25
Payer: COMMERCIAL

## 2025-06-25 ENCOUNTER — ULTRASOUND (OUTPATIENT)
Dept: GYNECOLOGY | Facility: CLINIC | Age: 55
End: 2025-06-25
Payer: COMMERCIAL

## 2025-06-25 DIAGNOSIS — N95.0 PMB (POSTMENOPAUSAL BLEEDING): Primary | ICD-10-CM

## 2025-06-25 PROCEDURE — 99213 OFFICE O/P EST LOW 20 MIN: CPT | Performed by: OBSTETRICS & GYNECOLOGY

## 2025-06-25 PROCEDURE — 76830 TRANSVAGINAL US NON-OB: CPT | Performed by: OBSTETRICS & GYNECOLOGY

## 2025-06-25 NOTE — PROGRESS NOTES
"AMB US Pelvic Non OB    Date/Time: 6/25/2025 2:00 PM    Performed by: Candice Liu  Authorized by: Benson Boo DO    Universal Protocol:  Consent: Verbal consent obtained  Consent given by: patient  Time out: Immediately prior to procedure a \"time out\" was called to verify the correct patient, procedure, equipment, support staff and site/side marked as required.  Timeout called at: 6/25/2025 1:59 PM.  Patient understanding: patient states understanding of the procedure being performed  Patient identity confirmed: verbally with patient    Procedure details:     SIS Procedure: No    Indications: non-obstetric vaginal bleeding      Technique:  Transvaginal US, Non-OB    Position: lithotomy exam    Uterine findings:     Length (cm): 7.46    Height (cm):  3.41    Width (cm):  4.43    Endometrial stripe: identified      Endometrium thickness (mm):  2.85  Left ovary findings:     Left ovary:  Visualized    Length (cm): 2.25    Height (cm): 1.31    Width (cm): 1.41  Right ovary findings:     Right ovary:  Visualized    Length (cm): 2.31    Height (cm): 1.22    Width (cm): 1.69  Other findings:     Free pelvic fluid: not identified      Free peritoneal fluid: not identified    Post-Procedure Details:     Impression:  Anteverted uterus is inhomogeneous throughout with a fundal intramural fibroid 1.6cm. There is some fluid within the cervical canal with debris.  The bilateral ovaries appear within normal limits. No free fluid.     Tolerance:  Tolerated well, no immediate complications    Complications: no complications    Additional Procedure Comments:      GE Voluson P8 transvaginal transducer RIC5-RA with Serial Number [id number] was used during procedure and subsequently cleaned with high level disinfection utilizing the Anaphore EPR Probe .     Ultrasound performed at:     [facility]  [address] [place] [address]  [address] [place] [address]  Phone: [phone]  Fax:  " [phone]

## 2025-06-25 NOTE — PROGRESS NOTES
":  Assessment & Plan  PMB (postmenopausal bleeding)  Reviewed ultrasound findings with the patient.  Endometrial thickness less than 3 mm therefore no biopsy nor saline infusion was obtained.  If she continues have episodes of bleeding, despite the reassuring findings on ultrasound, she will contact the office with most likely a change in dosage of HRT and for return to the office for biopsy           History of Present Illness     Lucy Rodriguez is a 54 y.o. female     HPI patient in contact the office on Janet 15 complaining of some slight amount of spotting and some abdominal cramping.  She is presently on HRT.  She is on estradiol patch 0.05 twice weekly and oral progesterone 100 mg daily.  She has not had any bleeding since 2023 until this recent episode of spotting.  She was advised to return to the office for TVS possible biopsy possible saline infusion  Review of Systems  Objective   LMP 08/16/2023 (Approximate)      Physical Exam    AMB US Pelvic Non OB [[id number]] ordered by Candice Liu     Post-procedure Diagnoses   PMB (postmenopausal bleeding) [N95.0]            AMB US Pelvic Non OB     Date/Time: 6/25/2025 2:00 PM     Performed by: Candice Liu  Authorized by: Benson Boo DO    Universal Protocol:  Consent: Verbal consent obtained  Consent given by: patient  Time out: Immediately prior to procedure a \"time out\" was called to verify the correct patient, procedure, equipment, support staff and site/side marked as required.  Timeout called at: 6/25/2025 1:59 PM.  Patient understanding: patient states understanding of the procedure being performed  Patient identity confirmed: verbally with patient     Procedure details:     SIS Procedure: No    Indications: non-obstetric vaginal bleeding      Technique:  Transvaginal US, Non-OB    Position: lithotomy exam    Uterine findings:     Length (cm): 7.46    Height (cm):  3.41    Width (cm):  4.43    Endometrial stripe: identified      Endometrium " thickness (mm):  2.85  Left ovary findings:     Left ovary:  Visualized    Length (cm): 2.25    Height (cm): 1.31    Width (cm): 1.41  Right ovary findings:     Right ovary:  Visualized    Length (cm): 2.31    Height (cm): 1.22    Width (cm): 1.69  Other findings:     Free pelvic fluid: not identified      Free peritoneal fluid: not identified    Post-Procedure Details:     Impression:  Anteverted uterus is inhomogeneous throughout with a fundal intramural fibroid 1.6cm. There is some fluid within the cervical canal with debris.  The bilateral ovaries appear within normal limits. No free fluid.     Tolerance:  Tolerated well, no immediate complications    Complications: no complications

## 2025-07-07 ENCOUNTER — ANNUAL EXAM (OUTPATIENT)
Dept: GYNECOLOGY | Facility: CLINIC | Age: 55
End: 2025-07-07
Payer: COMMERCIAL

## 2025-07-07 VITALS
WEIGHT: 139 LBS | HEIGHT: 64 IN | SYSTOLIC BLOOD PRESSURE: 100 MMHG | BODY MASS INDEX: 23.73 KG/M2 | DIASTOLIC BLOOD PRESSURE: 60 MMHG

## 2025-07-07 DIAGNOSIS — N94.10 DYSPAREUNIA IN FEMALE: ICD-10-CM

## 2025-07-07 DIAGNOSIS — Z01.411 ENCOUNTER FOR GYNECOLOGICAL EXAMINATION (GENERAL) (ROUTINE) WITH ABNORMAL FINDINGS: Primary | ICD-10-CM

## 2025-07-07 DIAGNOSIS — R39.15 URINARY URGENCY: ICD-10-CM

## 2025-07-07 PROCEDURE — 99396 PREV VISIT EST AGE 40-64: CPT | Performed by: OBSTETRICS & GYNECOLOGY

## 2025-07-07 NOTE — ASSESSMENT & PLAN NOTE
Discussed orgasm prior to penile penetration and continued use of coconut oil. Pt is agreeable to referral to pelvic floor PT.   Orders:    Ambulatory Referral to Physical Therapy; Future

## 2025-07-07 NOTE — PROGRESS NOTES
Name: Lucy Rodriguez      : 1970      MRN: [id number]  Encounter Provider: TOMMY Maya  Encounter Date: 2025   Encounter department: [facility]  :  Assessment & Plan  Encounter for gynecological examination (general) (routine) with abnormal findings  Recommended monthly SBE, annual CBE and annual screening mammo. ASCCP guidelines reviewed and pap with cotesting noted to be up to date; this low risk patient was advised she meets criteria to d/c pap screening at age 65. No pap done. Colonoscopy noted to be up to date.  Reviewed diet/activity recommendations Calcium 1200 mg and Vit D 600-1000 IU daily.  Discussed postmenopausal considerations and symptoms to report. Kegel exercises as instructed. RTO in one year for routine annual gyn exam or sooner PRN.           Dyspareunia in female  Discussed orgasm prior to penile penetration and continued use of coconut oil. Pt is agreeable to referral to pelvic floor PT.   Orders:    Ambulatory Referral to Physical Therapy; Future    Urinary urgency  Discussed diet/behavioral mods/timed voiding of every 2 hours/input and output. Will trial before adding med if necessary. Discussed that pelvic floor PT can also help with this concern.           History of Present Illness   This patient presents for routine annual gyn exam.  She is doing well on estradiol patch 0.05 mg twice weekly and oral progesterone 100 mg at hs, started 24.   She had an episode of PMB with endometrial lining of 2.85, attributed to atrophy.   She also reports urgency with urination. Denies UUI. Drinks about 64 oz of water daily. Denies significant irritant intake. Voiding about every 3 hours.   , sexually active. Pt reports pain with initial penetration despite coconut oil lubricant and vaginal estrogen 1/2 gm 2x per week.   Pap/HPV up to date and normal, 23.  Mammography up to date and normal, 25. Colonoscopy up to date,  "5/31/22.          Review of Systems   Constitutional: Negative.    Respiratory: Negative.     Cardiovascular: Negative.    Gastrointestinal: Negative.    Endocrine: Negative.    Genitourinary:  Positive for dyspareunia and urgency. Negative for dysuria, frequency, pelvic pain, vaginal bleeding, vaginal discharge and vaginal pain.   Musculoskeletal: Negative.    Skin: Negative.    Neurological: Negative.    Psychiatric/Behavioral: Negative.            Objective   /60 (BP Location: Left arm, Patient Position: Sitting, Cuff Size: Standard)   Ht 5' 4\" (1.626 m)   Wt 63 kg (139 lb)   LMP 08/16/2023 (Approximate)   BMI 23.86 kg/m²      Physical Exam  Vitals and nursing note reviewed.   Constitutional:       General: She is not in acute distress.     Appearance: She is well-developed.   HENT:      Head: Normocephalic and atraumatic.     Eyes:      Conjunctiva/sclera: Conjunctivae normal.       Cardiovascular:      Rate and Rhythm: Normal rate and regular rhythm.      Heart sounds: No murmur heard.  Pulmonary:      Effort: Pulmonary effort is normal. No respiratory distress.      Breath sounds: Normal breath sounds.   Chest:   Breasts:     Right: No swelling, bleeding, inverted nipple, mass, nipple discharge, skin change or tenderness.      Left: No swelling, bleeding, inverted nipple, mass, nipple discharge, skin change or tenderness.   Abdominal:      Palpations: Abdomen is soft.      Tenderness: There is no abdominal tenderness.   Genitourinary:     General: Normal vulva.      Exam position: Supine.      Labia:         Right: No rash, tenderness, lesion or injury.         Left: No rash, tenderness, lesion or injury.       Urethra: No urethral pain, urethral swelling or urethral lesion.      Vagina: No signs of injury and foreign body. No vaginal discharge, erythema, tenderness, bleeding, lesions or prolapsed vaginal walls.      Cervix: No cervical motion tenderness, discharge, friability, lesion, erythema, " cervical bleeding or eversion.      Uterus: Not deviated, not enlarged, not fixed, not tender and no uterine prolapse.       Adnexa:         Right: No mass, tenderness or fullness.          Left: No mass, tenderness or fullness.        Comments: Mild atrophy      Musculoskeletal:         General: No swelling.      Cervical back: Neck supple.     Skin:     General: Skin is warm and dry.      Capillary Refill: Capillary refill takes less than 2 seconds.     Neurological:      Mental Status: She is alert.     Psychiatric:         Mood and Affect: Mood normal.

## 2025-07-23 PROBLEM — Z00.00 MEDICARE ANNUAL WELLNESS VISIT, SUBSEQUENT: Status: RESOLVED | Noted: 2021-12-06 | Resolved: 2025-07-23

## 2025-07-30 DIAGNOSIS — F32.A DEPRESSION, UNSPECIFIED DEPRESSION TYPE: Primary | ICD-10-CM

## 2025-07-30 RX ORDER — FLUOXETINE 10 MG/1
10 CAPSULE ORAL DAILY
Qty: 30 CAPSULE | Refills: 5 | Status: SHIPPED | OUTPATIENT
Start: 2025-07-30

## 2025-08-03 ENCOUNTER — NURSE TRIAGE (OUTPATIENT)
Dept: OTHER | Facility: OTHER | Age: 55
End: 2025-08-03

## 2025-08-03 ENCOUNTER — PATIENT MESSAGE (OUTPATIENT)
Dept: GYNECOLOGY | Facility: CLINIC | Age: 55
End: 2025-08-03

## 2025-08-04 ENCOUNTER — TELEPHONE (OUTPATIENT)
Dept: GYNECOLOGY | Facility: CLINIC | Age: 55
End: 2025-08-04

## 2025-08-05 ENCOUNTER — EVALUATION (OUTPATIENT)
Dept: PHYSICAL THERAPY | Facility: REHABILITATION | Age: 55
End: 2025-08-05
Attending: OBSTETRICS & GYNECOLOGY
Payer: COMMERCIAL

## 2025-08-05 DIAGNOSIS — M62.89 PELVIC FLOOR TENSION: ICD-10-CM

## 2025-08-05 DIAGNOSIS — N94.10 DYSPAREUNIA IN FEMALE: Primary | ICD-10-CM

## 2025-08-05 DIAGNOSIS — N81.89 PELVIC FLOOR WEAKNESS IN FEMALE: ICD-10-CM

## 2025-08-05 PROCEDURE — 97530 THERAPEUTIC ACTIVITIES: CPT

## 2025-08-05 PROCEDURE — 97162 PT EVAL MOD COMPLEX 30 MIN: CPT

## 2025-08-06 ENCOUNTER — PATIENT MESSAGE (OUTPATIENT)
Dept: GYNECOLOGY | Facility: CLINIC | Age: 55
End: 2025-08-06

## 2025-08-08 DIAGNOSIS — Z78.0 MENOPAUSE: Primary | ICD-10-CM

## 2025-08-08 RX ORDER — PROGESTERONE 100 MG/1
200 CAPSULE ORAL
Qty: 180 CAPSULE | Refills: 0 | Status: SHIPPED | OUTPATIENT
Start: 2025-08-08

## 2025-08-20 ENCOUNTER — OFFICE VISIT (OUTPATIENT)
Dept: PHYSICAL THERAPY | Facility: REHABILITATION | Age: 55
End: 2025-08-20
Attending: OBSTETRICS & GYNECOLOGY
Payer: COMMERCIAL

## 2025-08-20 DIAGNOSIS — N94.10 DYSPAREUNIA IN FEMALE: Primary | ICD-10-CM

## 2025-08-20 DIAGNOSIS — N81.89 PELVIC FLOOR WEAKNESS IN FEMALE: ICD-10-CM

## 2025-08-20 DIAGNOSIS — Z78.0 MENOPAUSE: ICD-10-CM

## 2025-08-20 DIAGNOSIS — M62.89 PELVIC FLOOR TENSION: ICD-10-CM

## 2025-08-20 PROCEDURE — 97140 MANUAL THERAPY 1/> REGIONS: CPT

## 2025-08-20 PROCEDURE — 97112 NEUROMUSCULAR REEDUCATION: CPT

## 2025-08-20 PROCEDURE — 97110 THERAPEUTIC EXERCISES: CPT

## 2025-08-21 RX ORDER — ESTRADIOL 0.05 MG/D
1 PATCH, EXTENDED RELEASE TRANSDERMAL 2 TIMES WEEKLY
Qty: 24 PATCH | Refills: 0 | Status: SHIPPED | OUTPATIENT
Start: 2025-08-21

## 2025-08-22 DIAGNOSIS — R79.89 ABNORMAL CBC: Primary | ICD-10-CM
